# Patient Record
Sex: MALE | Race: BLACK OR AFRICAN AMERICAN | NOT HISPANIC OR LATINO | Employment: PART TIME | URBAN - METROPOLITAN AREA
[De-identification: names, ages, dates, MRNs, and addresses within clinical notes are randomized per-mention and may not be internally consistent; named-entity substitution may affect disease eponyms.]

---

## 2017-03-01 RX ORDER — CARVEDILOL 6.25 MG/1
TABLET ORAL
Qty: 180 TABLET | Refills: 0 | Status: SHIPPED | OUTPATIENT
Start: 2017-03-01 | End: 2017-05-28 | Stop reason: SDUPTHER

## 2017-04-05 RX ORDER — AMOXICILLIN 500 MG/1
CAPSULE ORAL
Qty: 4 CAPSULE | Refills: 0 | Status: SHIPPED | OUTPATIENT
Start: 2017-04-05 | End: 2017-08-01 | Stop reason: SDUPTHER

## 2017-04-10 RX ORDER — ATORVASTATIN CALCIUM 40 MG/1
TABLET, FILM COATED ORAL
Qty: 90 TABLET | Refills: 0 | Status: SHIPPED | OUTPATIENT
Start: 2017-04-10 | End: 2017-07-08 | Stop reason: SDUPTHER

## 2017-05-30 RX ORDER — CARVEDILOL 6.25 MG/1
TABLET ORAL
Qty: 180 TABLET | Refills: 0 | Status: SHIPPED | OUTPATIENT
Start: 2017-05-30 | End: 2017-09-01 | Stop reason: SDUPTHER

## 2017-07-10 RX ORDER — ATORVASTATIN CALCIUM 40 MG/1
TABLET, FILM COATED ORAL
Qty: 90 TABLET | Refills: 0 | Status: SHIPPED | OUTPATIENT
Start: 2017-07-10 | End: 2017-10-17 | Stop reason: SDUPTHER

## 2017-07-13 ENCOUNTER — OFFICE VISIT (OUTPATIENT)
Dept: CARDIOLOGY | Facility: CLINIC | Age: 77
End: 2017-07-13

## 2017-07-13 VITALS
SYSTOLIC BLOOD PRESSURE: 148 MMHG | BODY MASS INDEX: 27.09 KG/M2 | HEART RATE: 77 BPM | HEIGHT: 72 IN | WEIGHT: 200 LBS | DIASTOLIC BLOOD PRESSURE: 90 MMHG

## 2017-07-13 DIAGNOSIS — I25.10 CORONARY ARTERY DISEASE INVOLVING NATIVE CORONARY ARTERY OF NATIVE HEART WITHOUT ANGINA PECTORIS: ICD-10-CM

## 2017-07-13 DIAGNOSIS — Z98.890 S/P MVR (MITRAL VALVE REPAIR): ICD-10-CM

## 2017-07-13 DIAGNOSIS — Z95.2 S/P AVR (AORTIC VALVE REPLACEMENT): Primary | ICD-10-CM

## 2017-07-13 PROCEDURE — 93000 ELECTROCARDIOGRAM COMPLETE: CPT | Performed by: INTERNAL MEDICINE

## 2017-07-13 PROCEDURE — 99214 OFFICE O/P EST MOD 30 MIN: CPT | Performed by: INTERNAL MEDICINE

## 2017-07-13 RX ORDER — FEBUXOSTAT 40 MG/1
1 TABLET ORAL DAILY
Refills: 3 | COMMUNITY
Start: 2017-06-29

## 2017-07-13 NOTE — PROGRESS NOTES
Vj Bright  1940  Date of Office Visit: 7/13/17  Encounter Provider: Liborio Varghese MD  Place of Service: [facility] CARDIOLOGY    1. Followup aortic valve replacement with 25 mm bovine pericardial valve.   2. Mitral valve repair with flexible band.  3. Resection of subaortic stenosis.   4. STEMI.   5. Coronary artery disease.       History of Present Illness  Dr. Real,  I had the pleasure of seeing your patient back in follow up today. He is a pleasant 75-year-old gentleman who presented initially with a ST-elevation MI in and had a stent placed to his left circumflex coronary artery in 3/2015. He was found to have severe aortic stenosis on that hospitalization. He also had paroxysmal atrial fibrillation and associated with acute on chronic diastolic heart failure.    He was referred to Dr. Kim secondary to a mean gradient across his aortic valve of 58 mmHg and a valve area of 0.4 cm2.  He was admitted and underwent aortic valve replacement with 23 mm Milagros Graf bovine pericardial valve, mitral valve repair with 25 mm Medtronic flexible band and resection of subaortic stenosis. After CABG, he had paroxysmal atrial fibrillation and required IV amiodarone at increasing doses with return to sinus rhythm. He had some rebleeding and was taken back for evacuation of clot and  addressing of a smaller arterial bleeder. He did have some very mild expressive aphasia around the time of the operation, but this has resolved. He has had no recurrence of atrial fibrillation.  His amiodarone and Coumadin therapy have previously been stopped.      Since our last visit he has been doing very well.  He is working without any symptoms.  Denies any chest pain dyspnea on exertion or palpitations.  No sustained tachycardia.  He states that he feels well.  No orthopnea, PND, or lower extremity edema.      Review of Systems   Constitution: Negative for fever, weakness and  malaise/fatigue.   HENT: Negative for nosebleeds and sore throat.    Eyes: Negative for blurred vision and double vision.   Cardiovascular: Negative for chest pain, claudication, palpitations and syncope.   Respiratory: Negative for cough, shortness of breath and snoring.    Endocrine: Negative for cold intolerance, heat intolerance and polydipsia.   Skin: Negative for itching, poor wound healing and rash.   Musculoskeletal: Negative for joint pain, joint swelling, muscle weakness and myalgias.   Gastrointestinal: Negative for abdominal pain, melena, nausea and vomiting.   Neurological: Negative for light-headedness, loss of balance, seizures and vertigo.   Psychiatric/Behavioral: Negative for altered mental status and depression.       Past Medical History:   Diagnosis Date   • Acute respiratory failure    • Aortic stenosis    • BPH (benign prostatic hyperplasia)    • CAD (coronary artery disease)    • Chest pain    • CHF (congestive heart failure)    • Diabetes mellitus    • Gout    • Hyperlipidemia    • Hypertension    • PAF (paroxysmal atrial fibrillation)    • Pulmonary hypertension    • STEMI (ST elevation myocardial infarction)        The following portions of the patient's history were reviewed and updated as appropriate: Social history , Family history and Surgical history     Current Outpatient Prescriptions on File Prior to Visit   Medication Sig Dispense Refill   • ACCU-CHEK BRANDON PLUS test strip TEST DAILY AS DIRECTED  1   • ACCU-CHEK SOFTCLIX LANCETS lancets TEST DAILY AS DIRECTED  1   • amoxicillin (AMOXIL) 500 MG capsule TAKE 4 CAPSULES BY MOUTH ONE HOUR PRIOR TO DENTAL APPOINTMENT 4 capsule 0   • atorvastatin (LIPITOR) 40 MG tablet TAKE 1 TABLET BY MOUTH EVERY NIGHT AT BEDTIME 90 tablet 0   • carvedilol (COREG) 6.25 MG tablet TAKE 1 TABLET BY MOUTH TWICE DAILY WITH MEALS 180 tablet 0   • Cholecalciferol (VITAMIN D-3) 1000 UNITS capsule Take 1 capsule by mouth daily.     • furosemide (LASIX) 40 MG  "tablet TAKE 1 TABLET BY MOUTH EVERY DAY 90 tablet 3   • losartan (COZAAR) 25 MG tablet TAKE 1 TABLET BY MOUTH DAILY (Patient taking differently: TAKE 1 TABLET BY MOUTH TWICE DAILY) 30 tablet 5   • Omega-3 Fatty Acids (FISH OIL) 1000 MG capsule capsule Take 1 capsule by mouth daily.     • SitaGLIPtin-MetFORMIN HCl -1000 MG tablet sustained-release 24 hour Take 1 tablet by mouth daily.     • tamsulosin (FLOMAX) 0.4 MG capsule 24 hr capsule Take 2 capsules by mouth daily.     • [DISCONTINUED] allopurinol (ZYLOPRIM) 100 MG tablet Take 2 tablets by mouth daily.     • [DISCONTINUED] atorvastatin (LIPITOR) 40 MG tablet TAKE 1 TABLET BY MOUTH EVERY NIGHT AT BEDTIME 30 tablet 0   • [DISCONTINUED] carvedilol (COREG) 6.25 MG tablet TAKE 1 TABLET BY MOUTH TWICE DAILY WITH MEALS 60 tablet 5   • [DISCONTINUED] HYDROcodone-acetaminophen (NORCO) 5-325 MG per tablet   0   • [DISCONTINUED] losartan (COZAAR) 25 MG tablet TAKE 1 TABLET BY MOUTH DAILY 90 tablet 1   • [DISCONTINUED] pantoprazole (PROTONIX) 40 MG EC tablet Take 1 tablet by mouth daily.     • [DISCONTINUED] potassium chloride (KLOR-CON) 20 MEQ packet   1     No current facility-administered medications on file prior to visit.        No Known Allergies    Vitals:    07/13/17 1449   BP: 148/90   Pulse: 77   Weight: 200 lb (90.7 kg)   Height: 72\" (182.9 cm)     Physical Exam   Constitutional: He is oriented to person, place, and time. He appears well-developed and well-nourished.   HENT:   Head: Normocephalic and atraumatic.   Eyes: Conjunctivae and EOM are normal. No scleral icterus.   Neck: Normal range of motion. Neck supple. Normal carotid pulses, no hepatojugular reflux and no JVD present. Carotid bruit is not present. No tracheal deviation present. No thyromegaly present.   Cardiovascular: Normal rate and regular rhythm.  Exam reveals no gallop and no friction rub.    No murmur heard.  Pulses:       Carotid pulses are 2+ on the right side, and 2+ on the left " side.       Radial pulses are 2+ on the right side, and 2+ on the left side.        Femoral pulses are 2+ on the right side, and 2+ on the left side.       Dorsalis pedis pulses are 2+ on the right side, and 2+ on the left side.        Posterior tibial pulses are 2+ on the right side, and 2+ on the left side.   Pulmonary/Chest: Breath sounds normal. No respiratory distress. He has no decreased breath sounds. He has no wheezes. He has no rhonchi. He has no rales. He exhibits no tenderness.   Abdominal: Soft. Bowel sounds are normal. He exhibits no distension. There is no tenderness. There is no rebound.   Musculoskeletal: He exhibits no edema or deformity.   Neurological: He is alert and oriented to person, place, and time. He has normal strength. No sensory deficit.   Skin: No rash noted. No erythema.   Sternotomy     Psychiatric: He has a normal mood and affect. His behavior is normal.           No components found for: CBC  No results found for: CMP  No components found for: LIPID  No results found for: BMP      ECG 12 Lead  Date/Time: 7/13/2017 3:34 PM  Performed by: SARA BELTRE  Authorized by: SARA BELTRE   Comparison: compared with previous ECG from 7/8/2016  Similar to previous ECG  Rhythm: sinus rhythm  Rate: normal  ST Segments: ST segments normal  T Waves: T waves normal  QRS axis: normal  Other findings: LAE  Clinical impression: non-specific ECG            DISCUSSION/SUMMARY  76-year-old gentleman with a medical history as documented above including a presentation in 2015 with an inferior lateral STEMI and drug-eluting stent to the left circumflex coronary artery, severe aortic valve stenosis with a bioprosthetic aortic valve replacement as documented above, mitral valve repair, resection of subaortic stenosis, and paroxysmal atrial fibrillation perioperatively who presents back to me for follow-up.  He is doing very well and is asymptomatic.  His blood pressure is mildly elevated  today however he was a little frustrating dealing with some insurance issue.  His repeat was mildly improved.  Diastolic was still slightly elevated.  Otherwise no significant issues at this time.    1. Coronary disease, previous inferior lateral STEMI with percutaneous intervention to the left circumflex coronary artery disease: No angina.  Patient is doing very well.      -Continue his current regimen, which includes aspirin, carvedilol, losartan, and atorvastatin.   2. Aortic valve replacement, bioprosthetic.      -Continue aspirin.  Follow up echocardiogram after operation has been performed showing normal prosthetic valve functioning.    3.  Essential hypertension: Blood pressure is mildly elevated.  He is to monitor his blood pressure over the next week and if his numbers are above goal we'll plan on increasing his losartan to 75 mg ami.y    I will plan on having him see me back in clinic in one year.         Coronary Artery Disease  Assessment  • The patient has no angina    Plan  • Lifestyle modifications discussed include adhering to a heart healthy diet, avoidance of tobacco products, maintenance of a healthy weight, medication compliance and regular monitoring of cholesterol and blood pressure    Subjective - Objective  • There has been a previous stent procedure using MARIE  • Current antiplatelet therapy includes aspirin 81 mg

## 2017-07-20 RX ORDER — FUROSEMIDE 40 MG/1
TABLET ORAL
Qty: 90 TABLET | Refills: 2 | Status: SHIPPED | OUTPATIENT
Start: 2017-07-20 | End: 2018-04-25 | Stop reason: SDUPTHER

## 2017-07-31 ENCOUNTER — TELEPHONE (OUTPATIENT)
Dept: CARDIOLOGY | Facility: CLINIC | Age: 77
End: 2017-07-31

## 2017-08-01 RX ORDER — AMOXICILLIN 500 MG/1
2000 CAPSULE ORAL ONCE
Qty: 4 CAPSULE | Refills: 0 | Status: SHIPPED | OUTPATIENT
Start: 2017-08-01 | End: 2017-08-01

## 2017-08-10 ENCOUNTER — TELEPHONE (OUTPATIENT)
Dept: CARDIOLOGY | Facility: CLINIC | Age: 77
End: 2017-08-10

## 2017-08-10 NOTE — TELEPHONE ENCOUNTER
----- Message from Elsa Arias MA sent at 7/31/2017  4:35 PM EDT -----  Is this message intended for you? I don't mind at all to call the pt, but I didn't receive the initial call. It looks like the pt did call today, but not regarding bp.     ----- Message -----     From: Liborio Varghese MD     Sent: 7/31/2017   2:25 PM       To: Elsa Arias MA    Please tell this patient that his blood pressures have been reviewed and are acceptable.  I would not recommend any changes at this time

## 2017-09-02 RX ORDER — CARVEDILOL 6.25 MG/1
TABLET ORAL
Qty: 180 TABLET | Refills: 1 | Status: SHIPPED | OUTPATIENT
Start: 2017-09-02 | End: 2018-03-01 | Stop reason: SDUPTHER

## 2017-10-18 RX ORDER — ATORVASTATIN CALCIUM 40 MG/1
TABLET, FILM COATED ORAL
Qty: 90 TABLET | Refills: 1 | Status: SHIPPED | OUTPATIENT
Start: 2017-10-18 | End: 2018-04-16 | Stop reason: SDUPTHER

## 2017-11-27 ENCOUNTER — TELEPHONE (OUTPATIENT)
Dept: CARDIOLOGY | Facility: CLINIC | Age: 77
End: 2017-11-27

## 2017-11-27 NOTE — TELEPHONE ENCOUNTER
Pt called and is getting dental work done. You gave him amoxicillin 2,000mg in the past. Is this okay to send in again?    Lyric

## 2017-11-28 RX ORDER — AMOXICILLIN 500 MG/1
2000 CAPSULE ORAL SEE ADMIN INSTRUCTIONS
Qty: 12 CAPSULE | Refills: 0 | Status: SHIPPED | OUTPATIENT
Start: 2017-11-28 | End: 2018-01-18

## 2018-01-18 ENCOUNTER — OFFICE VISIT (OUTPATIENT)
Dept: CARDIOLOGY | Facility: CLINIC | Age: 78
End: 2018-01-18

## 2018-01-18 VITALS
SYSTOLIC BLOOD PRESSURE: 150 MMHG | WEIGHT: 200 LBS | BODY MASS INDEX: 27.09 KG/M2 | DIASTOLIC BLOOD PRESSURE: 86 MMHG | HEART RATE: 71 BPM | HEIGHT: 72 IN

## 2018-01-18 DIAGNOSIS — Z95.2 S/P AVR (AORTIC VALVE REPLACEMENT): ICD-10-CM

## 2018-01-18 DIAGNOSIS — Z98.890 S/P MVR (MITRAL VALVE REPAIR): ICD-10-CM

## 2018-01-18 DIAGNOSIS — I10 ESSENTIAL HYPERTENSION: ICD-10-CM

## 2018-01-18 DIAGNOSIS — I25.10 CORONARY ARTERY DISEASE INVOLVING NATIVE CORONARY ARTERY OF NATIVE HEART WITHOUT ANGINA PECTORIS: Primary | ICD-10-CM

## 2018-01-18 PROCEDURE — 93000 ELECTROCARDIOGRAM COMPLETE: CPT | Performed by: INTERNAL MEDICINE

## 2018-01-18 PROCEDURE — 99214 OFFICE O/P EST MOD 30 MIN: CPT | Performed by: INTERNAL MEDICINE

## 2018-01-18 RX ORDER — LOSARTAN POTASSIUM 50 MG/1
50 TABLET ORAL DAILY
Qty: 30 TABLET | Refills: 6 | Status: SHIPPED | OUTPATIENT
Start: 2018-01-18 | End: 2018-01-19 | Stop reason: SDUPTHER

## 2018-01-18 RX ORDER — ASPIRIN 325 MG
325 TABLET, DELAYED RELEASE (ENTERIC COATED) ORAL DAILY
COMMUNITY
End: 2019-08-14

## 2018-01-18 NOTE — PROGRESS NOTES
Vj Bright  1940  Date of Office Visit: 1/18/18  Encounter Provider: Liborio Varghese MD  Place of Service: [facility] CARDIOLOGY    Chief complaint  1. Followup aortic valve replacement with 25 mm bovine pericardial valve.   2. Mitral valve repair with flexible band.  3. Resection of subaortic stenosis.   4. STEMI.   5. Coronary artery disease.     History of Present Illness  Dr. Real,  I had the pleasure of seeing your patient back in follow up today. He is a pleasant 77-year-old gentleman who presented initially with a ST-elevation MI in and had a stent placed to his left circumflex coronary artery in 3/2015. He was found to have severe aortic stenosis on that hospitalization. He also had paroxysmal atrial fibrillation and associated with acute on chronic diastolic heart failure.    He was referred to Dr. Kim secondary to a mean gradient across his aortic valve of 58 mmHg and a valve area of 0.4 cm2.  He was admitted and underwent aortic valve replacement with 23 mm Milagros Graf bovine pericardial valve, mitral valve repair with 25 mm Medtronic flexible band and resection of subaortic stenosis. After CABG, he had paroxysmal atrial fibrillation and required IV amiodarone at increasing doses with return to sinus rhythm. He had some rebleeding and was taken back for evacuation of clot and  addressing of a smaller arterial bleeder. He did have some very mild expressive aphasia around the time of the operation, but this has resolved. He has had no recurrence of atrial fibrillation.  His amiodarone and Coumadin therapy have previously been stopped.       Since our last visit, he states he has been doing very well.  He denies any chest pain or dyspnea.  No orthopnea or PND.  He is working without limitations.            Review of Systems   Constitution: Negative for fever, weakness and malaise/fatigue.   HENT: Negative for nosebleeds and sore throat.    Eyes:  Negative for blurred vision and double vision.   Cardiovascular: Negative for chest pain, claudication, palpitations and syncope.   Respiratory: Negative for cough, shortness of breath and snoring.    Endocrine: Negative for cold intolerance, heat intolerance and polydipsia.   Skin: Negative for itching, poor wound healing and rash.   Musculoskeletal: Negative for joint pain, joint swelling, muscle weakness and myalgias.   Gastrointestinal: Negative for abdominal pain, melena, nausea and vomiting.   Neurological: Negative for light-headedness, loss of balance, seizures and vertigo.   Psychiatric/Behavioral: Negative for altered mental status and depression.       Past Medical History:   Diagnosis Date   • Acute respiratory failure    • Aortic stenosis    • BPH (benign prostatic hyperplasia)    • CAD (coronary artery disease)    • Chest pain    • CHF (congestive heart failure)    • Diabetes mellitus    • Gout    • Hyperlipidemia    • Hypertension    • PAF (paroxysmal atrial fibrillation)    • Pulmonary hypertension    • STEMI (ST elevation myocardial infarction)        The following portions of the patient's history were reviewed and updated as appropriate: Social history , Family history and Surgical history     Current Outpatient Prescriptions on File Prior to Visit   Medication Sig Dispense Refill   • ACCU-CHEK BRANDON PLUS test strip TEST DAILY AS DIRECTED  1   • ACCU-CHEK SOFTCLIX LANCETS lancets TEST DAILY AS DIRECTED  1   • atorvastatin (LIPITOR) 40 MG tablet TAKE 1 TABLET BY MOUTH EVERY NIGHT AT BEDTIME 90 tablet 1   • carvedilol (COREG) 6.25 MG tablet TAKE 1 TABLET BY MOUTH TWICE DAILY WITH MEALS 180 tablet 1   • Cholecalciferol (VITAMIN D-3) 1000 UNITS capsule Take 1 capsule by mouth daily.     • furosemide (LASIX) 40 MG tablet TAKE 1 TABLET BY MOUTH EVERY DAY 90 tablet 2   • losartan (COZAAR) 25 MG tablet TAKE 1 TABLET BY MOUTH DAILY (Patient taking differently: TAKE 1 TABLET BY MOUTH TWICE DAILY) 30 tablet  "5   • Omega-3 Fatty Acids (FISH OIL) 1000 MG capsule capsule Take 1 capsule by mouth daily.     • SitaGLIPtin-MetFORMIN HCl -1000 MG tablet sustained-release 24 hour Take 1 tablet by mouth daily.     • tamsulosin (FLOMAX) 0.4 MG capsule 24 hr capsule Take 2 capsules by mouth daily.     • ULORIC 40 MG tablet Take 1 tablet by mouth Daily.  3   • [DISCONTINUED] amoxicillin (AMOXIL) 500 MG capsule Take 4 capsules by mouth See Admin Instructions. 4 capsules 1 hour prior to procedure 12 capsule 0     No current facility-administered medications on file prior to visit.        No Known Allergies    Vitals:    01/18/18 0953   BP: 150/86   Pulse: 71   Weight: 90.7 kg (200 lb)   Height: 182.9 cm (72\")     Physical Exam   Constitutional: He is oriented to person, place, and time. He appears well-developed and well-nourished.   HENT:   Head: Normocephalic and atraumatic.   Eyes: Conjunctivae and EOM are normal. No scleral icterus.   Neck: Normal range of motion. Neck supple. Normal carotid pulses, no hepatojugular reflux and no JVD present. Carotid bruit is not present. No tracheal deviation present. No thyromegaly present.   Cardiovascular: Normal rate and regular rhythm.  Exam reveals no gallop and no friction rub.    No murmur heard.  Pulses:       Carotid pulses are 2+ on the right side, and 2+ on the left side.       Radial pulses are 2+ on the right side, and 2+ on the left side.        Femoral pulses are 2+ on the right side, and 2+ on the left side.       Dorsalis pedis pulses are 2+ on the right side, and 2+ on the left side.        Posterior tibial pulses are 2+ on the right side, and 2+ on the left side.   Pulmonary/Chest: Breath sounds normal. No respiratory distress. He has no decreased breath sounds. He has no wheezes. He has no rhonchi. He has no rales. He exhibits no tenderness.   Abdominal: Soft. Bowel sounds are normal. He exhibits no distension. There is no tenderness. There is no rebound. "   Musculoskeletal: He exhibits no edema or deformity.   Neurological: He is alert and oriented to person, place, and time. He has normal strength. No sensory deficit.   Skin: No rash noted. No erythema.   Sternotomy     Psychiatric: He has a normal mood and affect. His behavior is normal.       No components found for: CBC  No results found for: CMP  No components found for: LIPID  No results found for: BMP      ECG 12 Lead  Date/Time: 1/18/2018 10:06 AM  Performed by: SARA BELTRE  Authorized by: SARA BELTRE   Comparison: compared with previous ECG from 7/13/2017  Rhythm: sinus rhythm  Rate: normal  QRS axis: normal  Other findings: LAE  Clinical impression: non-specific ECG            DISCUSSION/SUMMARY  77-year-old gentleman with a medical history as documented above including a presentation in 2015 with an inferior lateral STEMI and drug-eluting stent to the left circumflex coronary artery, severe aortic valve stenosis with a bioprosthetic aortic valve replacement as documented above, mitral valve repair, resection of subaortic stenosis, and paroxysmal atrial fibrillation perioperatively who presents back to me for follow-up.  He is doing very well and is asymptomatic.  Blood pressure is mildly elevated again today.    1. Coronary disease, previous inferior lateral STEMI with percutaneous intervention to the left circumflex coronary artery disease: No angina.  Patient is doing very well.      -Continue his current regimen, which includes aspirin, carvedilol, losartan, and atorvastatin.    2. Aortic valve replacement, bioprosthetic.      -Continue aspirin.  Follow up echocardiogram after operation has been performed showing normal prosthetic valve functioning.    3.  Essential hypertension: Blood pressure is mildly elevated.  Increase losartan to 50 mg daily secondary to the elevation.    I will plan on having him see me back in clinic in one year.         Coronary Artery Disease  Assessment  •  The patient has no angina    Plan  • Lifestyle modifications discussed include adhering to a heart healthy diet, avoidance of tobacco products, maintenance of a healthy weight, medication compliance and regular monitoring of cholesterol and blood pressure    Subjective - Objective  • There has been a previous stent procedure using MARIE  • Current antiplatelet therapy includes aspirin 81 mg

## 2018-01-19 RX ORDER — LOSARTAN POTASSIUM 50 MG/1
50 TABLET ORAL 2 TIMES DAILY
Qty: 60 TABLET | Refills: 5 | Status: SHIPPED | OUTPATIENT
Start: 2018-01-19 | End: 2018-08-11 | Stop reason: SDUPTHER

## 2018-03-01 RX ORDER — PANTOPRAZOLE SODIUM 40 MG/1
TABLET, DELAYED RELEASE ORAL
Qty: 30 TABLET | Refills: 3 | OUTPATIENT
Start: 2018-03-01

## 2018-03-01 RX ORDER — CARVEDILOL 6.25 MG/1
6.25 TABLET ORAL 2 TIMES DAILY WITH MEALS
Qty: 180 TABLET | Refills: 1 | Status: SHIPPED | OUTPATIENT
Start: 2018-03-01 | End: 2018-08-30 | Stop reason: SDUPTHER

## 2018-04-16 RX ORDER — ATORVASTATIN CALCIUM 40 MG/1
TABLET, FILM COATED ORAL
Qty: 90 TABLET | Refills: 3 | Status: SHIPPED | OUTPATIENT
Start: 2018-04-16 | End: 2019-04-10 | Stop reason: SDUPTHER

## 2018-04-20 ENCOUNTER — TELEPHONE (OUTPATIENT)
Dept: CARDIOLOGY | Facility: CLINIC | Age: 78
End: 2018-04-20

## 2018-04-20 RX ORDER — AMOXICILLIN 500 MG/1
2000 CAPSULE ORAL SEE ADMIN INSTRUCTIONS
Qty: 4 CAPSULE | Refills: 0 | Status: SHIPPED | OUTPATIENT
Start: 2018-04-20 | End: 2018-10-12 | Stop reason: SDUPTHER

## 2018-04-20 NOTE — TELEPHONE ENCOUNTER
Pt called stating that he is due to have a dental in May and is needing as refill on his Amoxicillin     Is this ok to refill?

## 2018-04-25 RX ORDER — FUROSEMIDE 40 MG/1
TABLET ORAL
Qty: 90 TABLET | Refills: 0 | Status: SHIPPED | OUTPATIENT
Start: 2018-04-25 | End: 2018-07-25 | Stop reason: SDUPTHER

## 2018-05-01 ENCOUNTER — TELEPHONE (OUTPATIENT)
Dept: CARDIOLOGY | Facility: CLINIC | Age: 78
End: 2018-05-01

## 2018-05-01 RX ORDER — AMLODIPINE BESYLATE 5 MG/1
5 TABLET ORAL DAILY
Qty: 30 TABLET | Refills: 11 | COMMUNITY
Start: 2018-05-01 | End: 2018-09-17

## 2018-05-01 NOTE — TELEPHONE ENCOUNTER
I called him because he sent a letter. His HR has been running in the 70's. Also in the letter he let us know Dr. Real hadn't gotten our last couple of letters and wasn't aware that we added Losartan in 1/2018. He also said he saw Dr. Real on 4/24/18 and he added amlodipine 5mg qd.  I will get the note, it also seems we had the wrong Dr. Real as his PCP, when he registered they put his wife instead. So I will fax prior notes to him and I added the amlodipine to his list.    Lyric

## 2018-05-01 NOTE — TELEPHONE ENCOUNTER
----- Message from Liborio Varghese MD sent at 4/29/2018  4:39 PM EDT -----  I have a bunch of bp measurements on him, but I need HR as well.

## 2018-05-15 NOTE — TELEPHONE ENCOUNTER
I was finally able to get a hold of him. He said Dr. Real has changed a medication so he was going to drop by a list of what he is taking and a list of BPs. He also had a question about his Uloric and heart effects.  I will keep an eye out on this.    Lyric

## 2018-05-31 NOTE — TELEPHONE ENCOUNTER
The message for him.  I prefer to have him off of Uloric if possible, however if he has not done well on allopurinol, then that may be the only option

## 2018-07-25 RX ORDER — FUROSEMIDE 40 MG/1
TABLET ORAL
Qty: 90 TABLET | Refills: 0 | Status: SHIPPED | OUTPATIENT
Start: 2018-07-25 | End: 2018-09-17 | Stop reason: ALTCHOICE

## 2018-08-13 RX ORDER — LOSARTAN POTASSIUM 50 MG/1
TABLET ORAL
Qty: 180 TABLET | Refills: 1 | Status: SHIPPED | OUTPATIENT
Start: 2018-08-13 | End: 2019-02-09 | Stop reason: SDUPTHER

## 2018-08-30 RX ORDER — CARVEDILOL 6.25 MG/1
TABLET ORAL
Qty: 180 TABLET | Refills: 0 | Status: SHIPPED | OUTPATIENT
Start: 2018-08-30 | End: 2018-11-26 | Stop reason: SDUPTHER

## 2018-09-17 ENCOUNTER — APPOINTMENT (OUTPATIENT)
Dept: LAB | Facility: HOSPITAL | Age: 78
End: 2018-09-17

## 2018-09-17 ENCOUNTER — OFFICE VISIT (OUTPATIENT)
Dept: CARDIOLOGY | Facility: CLINIC | Age: 78
End: 2018-09-17

## 2018-09-17 VITALS
BODY MASS INDEX: 26.95 KG/M2 | DIASTOLIC BLOOD PRESSURE: 92 MMHG | HEART RATE: 65 BPM | SYSTOLIC BLOOD PRESSURE: 138 MMHG | WEIGHT: 199 LBS | HEIGHT: 72 IN

## 2018-09-17 DIAGNOSIS — Z98.890 S/P MVR (MITRAL VALVE REPAIR): ICD-10-CM

## 2018-09-17 DIAGNOSIS — Z95.2 S/P AVR (AORTIC VALVE REPLACEMENT): ICD-10-CM

## 2018-09-17 DIAGNOSIS — I25.10 CORONARY ARTERY DISEASE INVOLVING NATIVE CORONARY ARTERY OF NATIVE HEART WITHOUT ANGINA PECTORIS: ICD-10-CM

## 2018-09-17 DIAGNOSIS — I10 ESSENTIAL HYPERTENSION: Primary | ICD-10-CM

## 2018-09-17 LAB — NT-PROBNP SERPL-MCNC: 353.8 PG/ML (ref 0–1800)

## 2018-09-17 PROCEDURE — 83880 ASSAY OF NATRIURETIC PEPTIDE: CPT | Performed by: INTERNAL MEDICINE

## 2018-09-17 PROCEDURE — 36415 COLL VENOUS BLD VENIPUNCTURE: CPT | Performed by: INTERNAL MEDICINE

## 2018-09-17 PROCEDURE — 93000 ELECTROCARDIOGRAM COMPLETE: CPT | Performed by: INTERNAL MEDICINE

## 2018-09-17 PROCEDURE — 99214 OFFICE O/P EST MOD 30 MIN: CPT | Performed by: INTERNAL MEDICINE

## 2018-09-17 RX ORDER — CLONIDINE HYDROCHLORIDE 0.2 MG/1
TABLET ORAL DAILY
COMMUNITY
Start: 2018-09-04 | End: 2018-09-17

## 2018-09-17 RX ORDER — TORSEMIDE 10 MG/1
20 TABLET ORAL EVERY MORNING
COMMUNITY
Start: 2018-09-04 | End: 2020-04-07 | Stop reason: ALTCHOICE

## 2018-09-17 RX ORDER — CLONIDINE HYDROCHLORIDE 0.1 MG/1
0.3 TABLET ORAL NIGHTLY
COMMUNITY
Start: 2018-09-17 | End: 2020-04-07 | Stop reason: ALTCHOICE

## 2018-09-17 NOTE — PROGRESS NOTES
Vj Bright  1940  Date of Office Visit: 9/17/18  Encounter Provider: Liborio Varghese MD  Place of Service: [facility] CARDIOLOGY    Chief complaint  1. Followup aortic valve replacement with 25 mm bovine pericardial valve.   2. Mitral valve repair with flexible band.  3. Resection of subaortic stenosis.   4. STEMI.   5. Coronary artery disease.     History of Present Illness  Dr. Real,  I had the pleasure of seeing your patient back in follow up today. He is a pleasant 78-year-old gentleman who presented initially with a ST-elevation MI in and had a stent placed to his left circumflex coronary artery in 3/2015. He was found to have severe aortic stenosis on that hospitalization. He also had paroxysmal atrial fibrillation and associated with acute on chronic diastolic heart failure.    He was referred to Dr. Kim secondary to a mean gradient across his aortic valve of 58 mmHg and a valve area of 0.4 cm2.  He was admitted and underwent aortic valve replacement with 23 mm Milagros Graf bovine pericardial valve, mitral valve repair with 25 mm Medtronic flexible band and resection of subaortic stenosis. After CABG, he had paroxysmal atrial fibrillation and required IV amiodarone at increasing doses with return to sinus rhythm. He had some rebleeding and was taken back for evacuation of clot and addressing of a smaller arterial bleeder. He did have some very mild expressive aphasia around the time of the operation, but this has resolved. He has had no recurrence of atrial fibrillation.  His amiodarone and Coumadin therapy have previously been stopped.       Since our last visit, he has had some issues with his blood pressure.  He has been followed closely by your office and has been taken off amlodipine therapy.  He was started on clonidine; however, this is every day.  He has also continued on losartan therapy.  His blood pressures have looked better.  Today, he  is 138/92 mmHg, which is a little bit better than he has been in the past.  He does complain of bilateral lower extremity edema that is mild.  It tends to be positional in that, when he elevates his feet and his sleeping, it tends to improve.  By the morning, his legs look fine and then throughout the day he has at least mild bilateral lower extremity edema.  He denies any pain or asymmetry.  He has no dyspnea, orthopnea, or paroxysmal nocturnal dyspnea.        Review of Systems   Constitution: Negative for fever, weakness and malaise/fatigue.   HENT: Negative for nosebleeds and sore throat.    Eyes: Negative for blurred vision and double vision.   Cardiovascular: Negative for chest pain, claudication, palpitations and syncope.   Respiratory: Negative for cough, shortness of breath and snoring.    Endocrine: Negative for cold intolerance, heat intolerance and polydipsia.   Skin: Negative for itching, poor wound healing and rash.   Musculoskeletal: Negative for joint pain, joint swelling, muscle weakness and myalgias.   Gastrointestinal: Negative for abdominal pain, melena, nausea and vomiting.   Neurological: Negative for light-headedness, loss of balance, seizures and vertigo.   Psychiatric/Behavioral: Negative for altered mental status and depression.       Past Medical History:   Diagnosis Date   • Acute respiratory failure (CMS/[facility])    • Aortic stenosis    • BPH (benign prostatic hyperplasia)    • CAD (coronary artery disease)    • Chest pain    • CHF (congestive heart failure) (CMS/[facility])    • Diabetes mellitus (CMS/[facility])    • Gout    • Hyperlipidemia    • Hypertension    • PAF (paroxysmal atrial fibrillation) (CMS/[facility])    • Pulmonary hypertension    • STEMI (ST elevation myocardial infarction) (CMS/[facility])        The following portions of the patient's history were reviewed and updated as appropriate: Social history , Family history and Surgical history     Current Outpatient Prescriptions on File Prior to Visit  "  Medication Sig Dispense Refill   • ACCU-CHEK BRANDON PLUS test strip TEST DAILY AS DIRECTED  1   • ACCU-CHEK SOFTCLIX LANCETS lancets TEST DAILY AS DIRECTED  1   • amLODIPine (NORVASC) 5 MG tablet Take 1 tablet by mouth Daily. 30 tablet 11   • aspirin 81 MG EC tablet Take 81 mg by mouth Daily.     • atorvastatin (LIPITOR) 40 MG tablet TAKE ONE TABLET BY MOUTH EVERY NIGHT AT BEDTIME 90 tablet 3   • carvedilol (COREG) 6.25 MG tablet TAKE ONE TABLET BY MOUTH TWICE A DAY WITH MEALS 180 tablet 0   • Cholecalciferol (VITAMIN D-3) 1000 UNITS capsule Take 1 capsule by mouth daily.     • losartan (COZAAR) 50 MG tablet TAKE ONE TABLET BY MOUTH TWO TIMES A DAY (Patient taking differently: TAKES  TWO TABLETS ONCE DAILY) 180 tablet 1   • Omega-3 Fatty Acids (FISH OIL) 1000 MG capsule capsule Take 1 capsule by mouth daily.     • SitaGLIPtin-MetFORMIN HCl -1000 MG tablet sustained-release 24 hour Take 1 tablet by mouth daily.     • tamsulosin (FLOMAX) 0.4 MG capsule 24 hr capsule Take 2 capsules by mouth daily.     • ULORIC 40 MG tablet Take 1 tablet by mouth Daily.  3   • amoxicillin (AMOXIL) 500 MG capsule Take 4 capsules by mouth See Admin Instructions. 4 capsules 1 hour prior to procedure 4 capsule 0   • [DISCONTINUED] furosemide (LASIX) 40 MG tablet TAKE ONE TABLET BY MOUTH DAILY 90 tablet 0     No current facility-administered medications on file prior to visit.        No Known Allergies    Vitals:    09/17/18 0956   BP: 138/92   BP Location: Left arm   Patient Position: Sitting   Pulse: 65   Weight: 90.3 kg (199 lb)   Height: 182.9 cm (72\")     Physical Exam   Constitutional: He is oriented to person, place, and time. He appears well-developed and well-nourished.   HENT:   Head: Normocephalic and atraumatic.   Eyes: Conjunctivae and EOM are normal. No scleral icterus.   Neck: Normal range of motion. Neck supple. Normal carotid pulses, no hepatojugular reflux and no JVD present. Carotid bruit is not present. No " tracheal deviation present. No thyromegaly present.   Cardiovascular: Normal rate and regular rhythm.  Exam reveals no gallop and no friction rub.    No murmur heard.  Pulses:       Carotid pulses are 2+ on the right side, and 2+ on the left side.       Radial pulses are 2+ on the right side, and 2+ on the left side.        Femoral pulses are 2+ on the right side, and 2+ on the left side.       Dorsalis pedis pulses are 2+ on the right side, and 2+ on the left side.        Posterior tibial pulses are 2+ on the right side, and 2+ on the left side.   Pulmonary/Chest: Breath sounds normal. No respiratory distress. He has no decreased breath sounds. He has no wheezes. He has no rhonchi. He has no rales. He exhibits no tenderness.   Abdominal: Soft. Bowel sounds are normal. He exhibits no distension. There is no tenderness. There is no rebound.   Musculoskeletal: He exhibits edema (1+ bilateral LE). He exhibits no deformity.   Neurological: He is alert and oriented to person, place, and time. He has normal strength. No sensory deficit.   Skin: No rash noted. No erythema.   Sternotomy     Psychiatric: He has a normal mood and affect. His behavior is normal.       No components found for: CBC  No results found for: CMP  No components found for: LIPID  No results found for: BMP      ECG 12 Lead  Date/Time: 9/17/2018 10:13 AM  Performed by: SARA BELTRE  Authorized by: SARA BELTRE   Comparison: compared with previous ECG from 1/18/2018  Similar to previous ECG  Rhythm: sinus rhythm  Rate: normal  QRS axis: normal  Comments: Nonspecific T-wave abnormalities diffuse leads.            DISCUSSION/SUMMARY  78-year-old gentleman with a medical history as documented above including a presentation in 2015 with an inferior lateral STEMI and drug-eluting stent to the left circumflex coronary artery, severe aortic valve stenosis with a bioprosthetic aortic valve replacement as documented above, mitral valve repair,  resection of subaortic stenosis, and paroxysmal atrial fibrillation perioperatively who presents back to me for follow-up.  His blood pressure is better controlled than it has been, however is still not perfectly at goal.  He has mild bilateral lower extremity edema that to me seems to be more venous insufficiency than central vascular volume overload    1. Coronary disease, previous inferior lateral STEMI with percutaneous intervention to the left circumflex coronary artery disease: No angina.  Patient is doing very well.      -Continue his current regimen, which includes aspirin, carvedilol, losartan, and atorvastatin.    2. Aortic valve replacement, bioprosthetic.      -Continue aspirin.  Follow up echocardiogram after operation has been performed showing normal prosthetic valve functioning.    3.  Essential hypertension: Being closely managed by primary.  Reasonably controlled.  The half-life of clonidine would support at least twice a day dosing and I have recommended that he moved to twice a day 0.1 mg.  If necessary this can be increased to 0.2 mg, however I would not give him less frequently than twice a day.  4.  Bilateral lower extremity edema: Likely venous insufficiency.   -I'm going to repeat the transthoracic echocardiogram and order a proBNP to evaluate   -Recommend moderate pressure compression stockings.    I will plan on having him see me back in clinic in one year.         Coronary Artery Disease  Assessment  • The patient has no angina    Plan  • Lifestyle modifications discussed include adhering to a heart healthy diet, avoidance of tobacco products, maintenance of a healthy weight, medication compliance and regular monitoring of cholesterol and blood pressure    Subjective - Objective  • There has been a previous stent procedure using MARIE  • Current antiplatelet therapy includes aspirin 81 mg

## 2018-09-17 NOTE — PROGRESS NOTES
Please tell him that his lab looks good.  No evidence of volume overload.  Continue with compression stockings.  No change in his diuretic.

## 2018-09-21 ENCOUNTER — HOSPITAL ENCOUNTER (EMERGENCY)
Facility: HOSPITAL | Age: 78
Discharge: HOME | End: 2018-09-21
Attending: EMERGENCY MEDICINE | Admitting: EMERGENCY MEDICINE

## 2018-09-21 ENCOUNTER — APPOINTMENT (OUTPATIENT)
Dept: GENERAL RADIOLOGY | Facility: HOSPITAL | Age: 78
End: 2018-09-21

## 2018-09-21 VITALS
RESPIRATION RATE: 16 BRPM | DIASTOLIC BLOOD PRESSURE: 68 MMHG | TEMPERATURE: 97.5 F | SYSTOLIC BLOOD PRESSURE: 134 MMHG | HEIGHT: 72 IN | HEART RATE: 64 BPM | OXYGEN SATURATION: 96 %

## 2018-09-21 DIAGNOSIS — S92.912A UNSPECIFIED FRACTURE OF LEFT TOE(S), INITIAL ENCOUNTER FOR CLOSED FRACTURE: Primary | ICD-10-CM

## 2018-09-21 PROCEDURE — 99283 EMERGENCY DEPT VISIT LOW MDM: CPT

## 2018-09-21 PROCEDURE — 73630 X-RAY EXAM OF FOOT: CPT

## 2018-09-21 NOTE — ED PROVIDER NOTES
EMERGENCY DEPARTMENT ENCOUNTER    Room Number:  14/14  Date seen:  9/21/2018  Time seen: 6:28 AM  PCP: Nany Real MD  Historian: Patient      HPI:  Chief complaint: toe injury  Context: Vj Bright is a 78 y.o. male who presents to the ED c/o toe injury just prior to arrival.  Patient states that he kicked the post of his bed while walking.  He denies falling.  He denies any ankle or other extremity injury.  No previous foot injury.    MEDICAL RECORD REVIEW     No previous pertinent medical records.      ALLERGIES  Patient has no known allergies.    PAST MEDICAL HISTORY  Active Ambulatory Problems     Diagnosis Date Noted   • S/P AVR (aortic valve replacement) 01/21/2016   • S/P MVR (mitral valve repair) 01/21/2016   • Coronary artery disease involving native coronary artery of native heart without angina pectoris 07/13/2017   • Essential hypertension 01/18/2018     Resolved Ambulatory Problems     Diagnosis Date Noted   • No Resolved Ambulatory Problems     Past Medical History:   Diagnosis Date   • Acute respiratory failure (CMS/HCC)    • Aortic stenosis    • BPH (benign prostatic hyperplasia)    • CAD (coronary artery disease)    • Chest pain    • CHF (congestive heart failure) (CMS/HCC)    • Diabetes mellitus (CMS/HCC)    • Gout    • Hyperlipidemia    • Hypertension    • PAF (paroxysmal atrial fibrillation) (CMS/HCC)    • Pulmonary hypertension    • STEMI (ST elevation myocardial infarction) (CMS/[facility])        PAST SURGICAL HISTORY  Past Surgical History:   Procedure Laterality Date   • AORTIC VALVE REPAIR/REPLACEMENT     • CARDIAC SURGERY     • CARDIAC SURGERY     • CORONARY ANGIOPLASTY WITH STENT PLACEMENT     • MITRAL VALVE REPAIR/REPLACEMENT     • OTHER SURGICAL HISTORY      AORTIC VALVE REPAIR OF SUBVALVULAR AORTIC STENOSIS   • OTHER SURGICAL HISTORY      CARDIAC CATH PROCEDURE OUTCOME: SUCCESSFUL   • OTHER SURGICAL HISTORY      VENOUS THROMBECTOMY BY COMBINED LEG INCISION       FAMILY  HISTORY  Family History   Problem Relation Age of Onset   • Hypertension Mother    • Heart attack Father        SOCIAL HISTORY  Social History     Social History   • Marital status:      Spouse name: N/A   • Number of children: N/A   • Years of education: N/A     Occupational History   • Not on file.     Social History Main Topics   • Smoking status: Never Smoker   • Smokeless tobacco: Never Used   • Alcohol use Yes      Comment: social   • Drug use: No   • Sexual activity: Not on file     Other Topics Concern   • Not on file     Social History Narrative   • No narrative on file           REVIEW OF SYSTEMS  Review of Systems   Constitutional: Negative for chills and fever.   Respiratory: Negative for shortness of breath.    Cardiovascular: Negative for chest pain.   Gastrointestinal: Negative for abdominal pain, nausea and vomiting.   Musculoskeletal: Positive for arthralgias (Left 4th toe) and gait problem.   Neurological: Negative for weakness and numbness.           PHYSICAL EXAM  ED Triage Vitals [09/21/18 0607]   Temp Heart Rate Resp BP SpO2   97.5 °F (36.4 °C) 81 18 -- 98 %      Temp src Heart Rate Source Patient Position BP Location FiO2 (%)   Tympanic -- -- -- --     Physical Exam   Constitutional: He is oriented to person, place, and time and well-developed, well-nourished, and in no distress.   Cardiovascular: Normal rate, regular rhythm and normal heart sounds.    Pulmonary/Chest: Breath sounds normal. No respiratory distress.   Musculoskeletal:   Diffuse tenderness and swelling to the left proximal fourth toe.  No deformity.  No nail injury.  Skin intact.  Neurovascularly intact distally.  Remainder of foot is non-tender.  Left ankle is nontender.   Neurological: He is alert and oriented to person, place, and time.   Skin: Skin is warm and dry.   Psychiatric: Affect and judgment normal.   Nursing note and vitals reviewed.      RADIOLOGY  XR Foot 3+ View Left   Final Result      Nondisplaced  "fracture of proximal 4th phalanx.  Questionable proximal 5th phalanx fracture, though pt nontender.     I ordered the above noted radiological studies and reviewed the images on the PACS system.      PROCEDURES  Procedures  4th and 3rd toes Alfredo Taped by myself.       COURSE & MEDICAL DECISION MAKING  Pertinent Labs and Imaging studies that were ordered and reviewed are noted above.  Results were reviewed/discussed with the patient and they were also made aware of online assess.  Pt also made aware that some labs, such as cultures, will not be resulted during ER visit and follow up with PMD is necessary.         PROGRESS AND CONSULTS    Progress Notes:    0630 Reviewed pt's history and workup with Dr. Garcia (ER physician).  After a bedside evaluation, Dr. Garcia agrees with the plan of care    0650 The patient's history, physical exam, and lab findings were discussed with the physician, who also performed a face to face history and physical exam.  I discussed all results and noted any abnormalities with patient.  Discussed absoute need to recheck abnormalities with their family physician.  I answered any of the patient's questions.  Discussed plan for discharge, as there is no emergent indication for admission.  Pt is agreeable and understands need for follow up and repeat testing.  Pt is aware that discharge does not mean that nothing is wrong but it indicates no emergency is present and they must continue care with their family physician.  Pt is discharged with instructions to follow up with primary care doctor to have their blood pressure rechecked.     Disposition vitals:  /68 (BP Location: Right arm, Patient Position: Sitting)   Pulse 64   Temp 97.5 °F (36.4 °C) (Tympanic)   Resp 16   Ht 182.9 cm (72\")   SpO2 96%         DIAGNOSIS  Final diagnoses:   Unspecified fracture of left toe(s), initial encounter for closed fracture         DISPOSITION  Discharged      FOLLOW UP   Nany Real, " MD  [address] [id number] [phone]      As needed          RX     Medication List      Changed    losartan 50 MG tablet  Commonly known as:  COZAAR  TAKE ONE TABLET BY MOUTH TWO TIMES A DAY  What changed:  See the new instructions.                        Inocente Natarajan, PA  09/21/18 0712

## 2018-09-21 NOTE — ED PROVIDER NOTES
MD ATTESTATION NOTE    Pt presents to the ED complaining of left fourth toe pain that began earlier this morning. Pt states that his pain began after he accidentally struck his left toes on a bedpost. Pt states that his pain is worse with ambulation and denies additional injury. Pt's L foot XR shows a fracture of the proximal phalanx of the left fourth toe. On exam, the pt has tenderness without deformity to the left fourth toe. I agree with the plan to zack tape the pt's toes and to discharge the pt home.    The MARGRET and I have discussed this patient's history, physical exam, and treatment plan.  I have reviewed the documentation and personally had a face to face interaction with the patient. I affirm the documentation and agree with the treatment and plan.  The attached note describes my personal findings.    Documentation assistance provided by lalo Pennington for Dr. Garcia.  Information recorded by the scribe was done at my direction and has been verified and validated by me.       Aria Pennington  09/21/18 0632       Corey Garcia MD  09/21/18 0752

## 2018-09-21 NOTE — ED NOTES
Pt states he hit rt 4th toe on bed post this am.  C/o pain to toe with ambulation.     Liane Lazo RN  09/21/18 9044

## 2018-09-21 NOTE — ED NOTES
"Pt walked to triage with steady gait. Pt states \" I think I broke my toe\". Pt hit his foot on a bed post. pt reports pain on 4th toe of left foot.      Michelle Kc RN  09/21/18 0607    "

## 2018-09-28 ENCOUNTER — HOSPITAL ENCOUNTER (OUTPATIENT)
Dept: CARDIOLOGY | Facility: HOSPITAL | Age: 78
Discharge: HOME | End: 2018-09-28
Attending: INTERNAL MEDICINE | Admitting: INTERNAL MEDICINE

## 2018-09-28 VITALS
DIASTOLIC BLOOD PRESSURE: 80 MMHG | SYSTOLIC BLOOD PRESSURE: 144 MMHG | BODY MASS INDEX: 26.82 KG/M2 | HEART RATE: 56 BPM | HEIGHT: 72 IN | WEIGHT: 198 LBS

## 2018-09-28 DIAGNOSIS — Z98.890 S/P MVR (MITRAL VALVE REPAIR): ICD-10-CM

## 2018-09-28 DIAGNOSIS — Z95.2 S/P AVR (AORTIC VALVE REPLACEMENT): ICD-10-CM

## 2018-09-28 LAB
AORTIC DIMENSIONLESS INDEX: 0.6 (DI)
BH CV ECHO MEAS - ACS: 1.2 CM
BH CV ECHO MEAS - AO MAX PG (FULL): 9.2 MMHG
BH CV ECHO MEAS - AO MAX PG: 12.8 MMHG
BH CV ECHO MEAS - AO MEAN PG (FULL): 4.3 MMHG
BH CV ECHO MEAS - AO MEAN PG: 6.6 MMHG
BH CV ECHO MEAS - AO ROOT AREA (BSA CORRECTED): 1.6
BH CV ECHO MEAS - AO ROOT AREA: 9.3 CM^2
BH CV ECHO MEAS - AO ROOT DIAM: 3.4 CM
BH CV ECHO MEAS - AO V2 MAX: 179 CM/SEC
BH CV ECHO MEAS - AO V2 MEAN: 124.1 CM/SEC
BH CV ECHO MEAS - AO V2 VTI: 40.6 CM
BH CV ECHO MEAS - AVA(I,A): 1.8 CM^2
BH CV ECHO MEAS - AVA(I,D): 1.8 CM^2
BH CV ECHO MEAS - AVA(V,A): 1.6 CM^2
BH CV ECHO MEAS - AVA(V,D): 1.6 CM^2
BH CV ECHO MEAS - BSA(HAYCOCK): 2.1 M^2
BH CV ECHO MEAS - BSA: 2.1 M^2
BH CV ECHO MEAS - BZI_BMI: 26.9 KILOGRAMS/M^2
BH CV ECHO MEAS - BZI_METRIC_HEIGHT: 182.9 CM
BH CV ECHO MEAS - BZI_METRIC_WEIGHT: 89.8 KG
BH CV ECHO MEAS - EDV(CUBED): 172.8 ML
BH CV ECHO MEAS - EDV(MOD-SP2): 78 ML
BH CV ECHO MEAS - EDV(MOD-SP4): 83 ML
BH CV ECHO MEAS - EDV(TEICH): 151.8 ML
BH CV ECHO MEAS - EF(CUBED): 81.2 %
BH CV ECHO MEAS - EF(MOD-BP): 61 %
BH CV ECHO MEAS - EF(MOD-SP2): 57.7 %
BH CV ECHO MEAS - EF(MOD-SP4): 61.4 %
BH CV ECHO MEAS - EF(TEICH): 73.3 %
BH CV ECHO MEAS - ESV(CUBED): 32.4 ML
BH CV ECHO MEAS - ESV(MOD-SP2): 33 ML
BH CV ECHO MEAS - ESV(MOD-SP4): 32 ML
BH CV ECHO MEAS - ESV(TEICH): 40.6 ML
BH CV ECHO MEAS - FS: 42.8 %
BH CV ECHO MEAS - IVS/LVPW: 1
BH CV ECHO MEAS - IVSD: 1.2 CM
BH CV ECHO MEAS - LAT PEAK E' VEL: 9 CM/SEC
BH CV ECHO MEAS - LV DIASTOLIC VOL/BSA (35-75): 39.1 ML/M^2
BH CV ECHO MEAS - LV MASS(C)D: 269 GRAMS
BH CV ECHO MEAS - LV MASS(C)DI: 126.8 GRAMS/M^2
BH CV ECHO MEAS - LV MAX PG: 3.6 MMHG
BH CV ECHO MEAS - LV MEAN PG: 2.3 MMHG
BH CV ECHO MEAS - LV SYSTOLIC VOL/BSA (12-30): 15.1 ML/M^2
BH CV ECHO MEAS - LV V1 MAX: 94.5 CM/SEC
BH CV ECHO MEAS - LV V1 MEAN: 70.7 CM/SEC
BH CV ECHO MEAS - LV V1 VTI: 23.1 CM
BH CV ECHO MEAS - LVIDD: 5.6 CM
BH CV ECHO MEAS - LVIDS: 3.2 CM
BH CV ECHO MEAS - LVLD AP2: 7 CM
BH CV ECHO MEAS - LVLD AP4: 7.4 CM
BH CV ECHO MEAS - LVLS AP2: 6.5 CM
BH CV ECHO MEAS - LVLS AP4: 5.5 CM
BH CV ECHO MEAS - LVOT AREA (M): 3.1 CM^2
BH CV ECHO MEAS - LVOT AREA: 3.1 CM^2
BH CV ECHO MEAS - LVOT DIAM: 2 CM
BH CV ECHO MEAS - LVPWD: 1.2 CM
BH CV ECHO MEAS - MED PEAK E' VEL: 5 CM/SEC
BH CV ECHO MEAS - MV A DUR: 0.14 SEC
BH CV ECHO MEAS - MV A MAX VEL: 139.1 CM/SEC
BH CV ECHO MEAS - MV DEC SLOPE: 254 CM/SEC^2
BH CV ECHO MEAS - MV DEC TIME: 0.31 SEC
BH CV ECHO MEAS - MV E MAX VEL: 107 CM/SEC
BH CV ECHO MEAS - MV E/A: 0.77
BH CV ECHO MEAS - MV MAX PG: 9.9 MMHG
BH CV ECHO MEAS - MV MEAN PG: 3.6 MMHG
BH CV ECHO MEAS - MV P1/2T MAX VEL: 114 CM/SEC
BH CV ECHO MEAS - MV P1/2T: 131.5 MSEC
BH CV ECHO MEAS - MV V2 MAX: 157.5 CM/SEC
BH CV ECHO MEAS - MV V2 MEAN: 86.5 CM/SEC
BH CV ECHO MEAS - MV V2 VTI: 52.4 CM
BH CV ECHO MEAS - MVA P1/2T LCG: 1.9 CM^2
BH CV ECHO MEAS - MVA(P1/2T): 1.7 CM^2
BH CV ECHO MEAS - MVA(VTI): 1.4 CM^2
BH CV ECHO MEAS - PA MAX PG (FULL): 7.5 MMHG
BH CV ECHO MEAS - PA MAX PG: 8.4 MMHG
BH CV ECHO MEAS - PA V2 MAX: 145.2 CM/SEC
BH CV ECHO MEAS - PULM A REVS DUR: 0.11 SEC
BH CV ECHO MEAS - PULM A REVS VEL: 23.9 CM/SEC
BH CV ECHO MEAS - PULM DIAS VEL: 44.2 CM/SEC
BH CV ECHO MEAS - PULM S/D: 0.71
BH CV ECHO MEAS - PULM SYS VEL: 31.6 CM/SEC
BH CV ECHO MEAS - PVA(V,A): 1.4 CM^2
BH CV ECHO MEAS - PVA(V,D): 1.4 CM^2
BH CV ECHO MEAS - QP/QS: 0.8
BH CV ECHO MEAS - RAP SYSTOLE: 3 MMHG
BH CV ECHO MEAS - RV MAX PG: 0.92 MMHG
BH CV ECHO MEAS - RV MEAN PG: 0.65 MMHG
BH CV ECHO MEAS - RV V1 MAX: 48 CM/SEC
BH CV ECHO MEAS - RV V1 MEAN: 38.8 CM/SEC
BH CV ECHO MEAS - RV V1 VTI: 13.8 CM
BH CV ECHO MEAS - RVOT AREA: 4.1 CM^2
BH CV ECHO MEAS - RVOT DIAM: 2.3 CM
BH CV ECHO MEAS - RVSP: 25 MMHG
BH CV ECHO MEAS - SI(AO): 177.3 ML/M^2
BH CV ECHO MEAS - SI(CUBED): 66.2 ML/M^2
BH CV ECHO MEAS - SI(LVOT): 33.8 ML/M^2
BH CV ECHO MEAS - SI(MOD-SP2): 21.2 ML/M^2
BH CV ECHO MEAS - SI(MOD-SP4): 24 ML/M^2
BH CV ECHO MEAS - SI(TEICH): 52.4 ML/M^2
BH CV ECHO MEAS - SUP REN AO DIAM: 2 CM
BH CV ECHO MEAS - SV(AO): 376.2 ML
BH CV ECHO MEAS - SV(CUBED): 140.4 ML
BH CV ECHO MEAS - SV(LVOT): 71.7 ML
BH CV ECHO MEAS - SV(MOD-SP2): 45 ML
BH CV ECHO MEAS - SV(MOD-SP4): 51 ML
BH CV ECHO MEAS - SV(RVOT): 57.1 ML
BH CV ECHO MEAS - SV(TEICH): 111.2 ML
BH CV ECHO MEAS - TAPSE (>1.6): 1.5 CM2
BH CV ECHO MEAS - TR MAX VEL: 236.2 CM/SEC
BH CV ECHO MEASUREMENTS AVERAGE E/E' RATIO: 15.29
BH CV XLRA - RV BASE: 3.3 CM
BH CV XLRA - TDI S': 8 CM/SEC
LEFT ATRIUM VOLUME INDEX: 34 ML/M2

## 2018-09-28 PROCEDURE — 93306 TTE W/DOPPLER COMPLETE: CPT

## 2018-09-28 PROCEDURE — 93306 TTE W/DOPPLER COMPLETE: CPT | Performed by: INTERNAL MEDICINE

## 2018-09-28 NOTE — PROGRESS NOTES
I spoke with the patient.  He is still having swelling.  His proBNP was normal and his echo looks fine.  I would not recommend increase diuretics.  I told him if he doesn't notice improvement over the next couple weeks that he should be referred to the lymphedema clinic

## 2018-10-12 RX ORDER — AMOXICILLIN 500 MG/1
2000 CAPSULE ORAL SEE ADMIN INSTRUCTIONS
Qty: 4 CAPSULE | Refills: 3 | Status: SHIPPED | OUTPATIENT
Start: 2018-10-12 | End: 2019-08-14

## 2018-10-15 ENCOUNTER — TELEPHONE (OUTPATIENT)
Dept: CARDIOLOGY | Facility: CLINIC | Age: 78
End: 2018-10-15

## 2018-10-15 NOTE — TELEPHONE ENCOUNTER
Patient wanted you to know that Dr. Garvey his kidney doctor increased his torsemide from 10mg to 20mg once daily  Has swollen ankles  Wanted to update you on this.

## 2018-10-22 RX ORDER — FUROSEMIDE 40 MG/1
TABLET ORAL
Qty: 90 TABLET | Refills: 0 | OUTPATIENT
Start: 2018-10-22

## 2018-11-26 RX ORDER — CARVEDILOL 6.25 MG/1
TABLET ORAL
Qty: 180 TABLET | Refills: 0 | Status: SHIPPED | OUTPATIENT
Start: 2018-11-26 | End: 2019-02-22 | Stop reason: SDUPTHER

## 2018-11-27 ENCOUNTER — TELEPHONE (OUTPATIENT)
Dept: CARDIOLOGY | Facility: CLINIC | Age: 78
End: 2018-11-27

## 2018-11-27 DIAGNOSIS — R60.0 LOWER EXTREMITY EDEMA: Primary | ICD-10-CM

## 2018-11-27 NOTE — TELEPHONE ENCOUNTER
I called him back. He said he is never short of breath since his heart surgery.  So I put the referral in for the Lymphedema Clinic. Can you co-sign it when you get a chance.    Thanks,  Lyric

## 2018-11-27 NOTE — TELEPHONE ENCOUNTER
Pt called about the letter he sent.  Also see message from Cara on 10/15/18.    He said he is still having foot swelling even with the increase in his torsemide from 10mg to 20mg qd. This is even with using compression stockings as you recommended.  He said you had mentioned a possible referral to the Lymphedema Clinic downstairs.  Please advise.    Thanks,  Lyric    On 9/17/18:    4.  Bilateral lower extremity edema: Likely venous insufficiency.              -I'm going to repeat the transthoracic echocardiogram and order a proBNP to evaluate              -Recommend moderate pressure compression stockings.

## 2018-12-19 ENCOUNTER — HOSPITAL ENCOUNTER (OUTPATIENT)
Dept: PHYSICAL THERAPY | Facility: HOSPITAL | Age: 78
Setting detail: THERAPIES SERIES
Discharge: HOME | End: 2018-12-19

## 2018-12-19 DIAGNOSIS — I89.0 LYMPHEDEMA: Primary | ICD-10-CM

## 2018-12-19 PROCEDURE — G8987 SELF CARE CURRENT STATUS: HCPCS

## 2018-12-19 PROCEDURE — G8988 SELF CARE GOAL STATUS: HCPCS

## 2018-12-19 PROCEDURE — 97161 PT EVAL LOW COMPLEX 20 MIN: CPT

## 2018-12-19 NOTE — THERAPY EVALUATION
Physical Therapy Lymphedema Initial Evaluation  [facility]     Patient Name: Vj Bright  : 1940  MRN: [id number]  Today's Date: 2018      Visit Date: 2018    Visit Dx:    ICD-10-CM ICD-9-CM   1. Lymphedema I89.0 457.1       Patient Active Problem List   Diagnosis   • S/P AVR (aortic valve replacement)   • S/P MVR (mitral valve repair)   • Coronary artery disease involving native coronary artery of native heart without angina pectoris   • Essential hypertension        Past Medical History:   Diagnosis Date   • Acute respiratory failure (CMS/[facility])    • Aortic stenosis    • BPH (benign prostatic hyperplasia)    • CAD (coronary artery disease)    • Chest pain    • CHF (congestive heart failure) (CMS/[facility])    • Diabetes mellitus (CMS/[facility])    • Gout    • Hyperlipidemia    • Hypertension    • PAF (paroxysmal atrial fibrillation) (CMS/[facility])    • Pulmonary hypertension    • STEMI (ST elevation myocardial infarction) (CMS/[facility])         Past Surgical History:   Procedure Laterality Date   • AORTIC VALVE REPAIR/REPLACEMENT     • CARDIAC SURGERY     • CARDIAC SURGERY     • CORONARY ANGIOPLASTY WITH STENT PLACEMENT     • MITRAL VALVE REPAIR/REPLACEMENT     • OTHER SURGICAL HISTORY      AORTIC VALVE REPAIR OF SUBVALVULAR AORTIC STENOSIS   • OTHER SURGICAL HISTORY      CARDIAC CATH PROCEDURE OUTCOME: SUCCESSFUL   • OTHER SURGICAL HISTORY      VENOUS THROMBECTOMY BY COMBINED LEG INCISION       Visit Dx:    ICD-10-CM ICD-9-CM   1. Lymphedema I89.0 457.1       Patient History     Row Name 18 1200             History    Chief Complaint  Swelling  -PC      Date Current Problem(s) Began  18  -PC      Brief Description of Current Complaint  Pt states he has had swelling in both ankles for about 3 months.  His dr changed his meds but swelling persists. He wears compression socks, but is unsure what strength they are.  Other medical history includes, DM, HTN, chronic kidney disease stage 3, aortic  valve replacement 6/2015.  -PC      Patient/Caregiver Goals  Decrease swelling;Know what to do to help the symptoms  -PC      How has patient tried to help current problem?  Compression stockings  -PC         Pain     Pain at Present  0  -PC      Pain at Best  0  -PC      Pain at Worst  0  -PC      What Performance Factors Make the Current Problem(s) WORSE?  Swelling worsens as day goes on.  -PC      What Performance Factors Make the Current Problem(s) BETTER?  elevation, swelling is  better in the am.  -PC      Difficulties at work?  Able to do regular job  -PC      Difficulties with ADL's?  Cannot fit into dress shoes.  -PC      Difficulties with recreational activities?  Able to cont to play tennis.  -PC         Fall Risk Assessment    Any falls in the past year:  No  -PC         Services    Are you currently receiving Home Health services  No  -PC         Daily Activities    Primary Language  English  -PC      How does patient learn best?  Listening;Reading  -PC      Teaching needs identified  Management of Condition;Home Exercise Program  -PC      Does patient have problems with the following?  None  -PC      Barriers to learning  None  -PC      Functional Status  dressing  -PC      Explanation of Functional Status Problem  can't fit into dress shoes  -PC      Pt Participated in POC and Goals  Yes  -PC         Safety    Are you being hurt, hit, or frightened by anyone at home or in your life?  No  -PC      Are you being neglected by a caregiver  No  -PC        User Key  (r) = Recorded By, (t) = Taken By, (c) = Cosigned By    Initials Name Provider Type    Claudia Frias PT Physical Therapist          Lymphedema     Row Name 12/19/18 1200             Subjective Pain    Able to rate subjective pain?  yes  -PC      Pre-Treatment Pain Level  0  -PC      Post-Treatment Pain Level  0  -PC         Lymphedema Assessment    Lymphedema Classification  RLE:;LLE:;stage 2 (Spontaneously Irreversible)  -PC       Infections or Cellulitis?  no  -PC         General ROM    GENERAL ROM COMMENTS  WFL  -PC         Lymphedema Edema Assessment    Ptting Edema Category  By grade out of 4  -PC      Pitting Edema  + 3/4;+ 1/4 (+1 of 4) 3+ ankles, 1+ along ant tibia  -PC      Edema Assessment Comment  Mod edema both ankles, mid edema ant tibia  -PC         Skin Changes/Observations    Skin Observations Comment  very dry  -PC         Lymphedema Sensation    Lymphedema Sensation Tests  light touch  -PC      Lymphedema Light Touch  WNL  -PC         Lymphedema Measurements    Measurement Type(s)  Circumferential  -PC      Circumferential Areas  Lower extremities  -PC         BLE Circumferential (cm)    Measurement Location 1  Knee  -PC      Left 1  38.8 cm  -PC      Right 1  38 cm  -PC      Measurement Location 2  10cm below knee  -PC      Left 2  37.2 cm  -PC      Right 2  36 cm  -PC      Measurement Location 3  20cm below knee  -PC      Left 3  33 cm  -PC      Right 3  33 cm  -PC      Measurement Location 4  30cm below knee  -PC      Left 4  27 cm  -PC      Right 4  26.5 cm  -PC      Measurement Location 5  Ankle (smallest)  -PC      Left 5  24.7 cm  -PC      Right 5  22.8 cm  -PC      Measurement Location 6  Ankle (largest)  -PC      Left 6  29.8 cm  -PC      Right 6  28 cm  -PC      Measurement Location 7  Midfoot  -PC      Left 7  24.5 cm  -PC      Right 7  24 cm  -PC      LLE Circumferential Total  215 cm  -PC      RLE Circumferential Total  208.3 cm  -PC        User Key  (r) = Recorded By, (t) = Taken By, (c) = Cosigned By    Initials Name Provider Type    Claudia Frias, PT Physical Therapist                          Therapy Education  Education Details: Reviewed treatment program and plan of care.  Given: Other (comment)  Program: New  How Provided: Verbal, Written(Issued written lymphedema info.)  Provided to: Patient  Level of Understanding: Verbalized      Exercises     Row Name 12/19/18 1200             Subjective Pain     Able to rate subjective pain?  yes  -PC      Pre-Treatment Pain Level  0  -PC      Post-Treatment Pain Level  0  -PC        User Key  (r) = Recorded By, (t) = Taken By, (c) = Cosigned By    Initials Name Provider Type    Claudia Frias, PT Physical Therapist                        PT OP Goals     Row Name 12/19/18 1200          PT Short Term Goals    STG Date to Achieve  01/02/19  -PC     STG 1  Pt demo awareness of condition and precautions for improved prevention, management, care of symptoms, and ease of transition to self-care of condition.  -PC     STG 1 Progress  New  -PC     STG 2  Pt/family independent with self-wrapping techniques of compression bandages as indicated for improved self-management of condition.  -PC     STG 2 Progress  New  -PC     STG 3  Pt demo decreased net edema of >/=3-7cm for decreased edema symptoms, decreased risk of infection, and improved skin care.  -PC     STG 3 Progress  New  -PC        Long Term Goals    LTG Date to Achieve  01/19/19  -PC     LTG 1  Pt/family independent with self-care techniques for self-management of condition.  -PC     LTG 1 Progress  New  -PC     LTG 2  Pt demo decreased net edema of >/=7-10cm for decreased edema symptoms, decreased risk of infection, and improved skin care.  -PC     LTG 2 Progress  New  -PC     LTG 3  Pt/family independent with compression garments as indicated for self-management of condition.  -PC     LTG 3 Progress  New  -PC        Time Calculation    PT Goal Re-Cert Due Date  03/19/19  -PC       User Key  (r) = Recorded By, (t) = Taken By, (c) = Cosigned By    Initials Name Provider Type    Claudia Frias, PT Physical Therapist          PT Assessment/Plan     Row Name 12/19/18 1229          PT Assessment    Functional Limitations  Other (comment) Difficulty fitting into shoes.  -PC     Impairments  Edema;Impaired lymphatic circulation  -PC     Assessment Comments  Pt is a 78 yr old male who presents with mod edema in both ankles  and min edema along ant tibias.  He has very dry skin and 3+ pitting at ankles.  Pt is able to do most functional activity, but has trouble fitting into shoes.  Pt wears compression stockings, but is unsure of the amount of compression.  Other medical problems incl Chronic kidney disease, aortic valve replacement, HTN, diabetes. Pt is a good candidate for treatment to decrease the swelling in his ankles and to learn self-care techniques.  -PC     Please refer to paper survey for additional self-reported information  Yes  -PC     Rehab Potential  Good  -PC     Patient/caregiver participated in establishment of treatment plan and goals  Yes  -PC     Patient would benefit from skilled therapy intervention  Yes  -PC        PT Plan    PT Frequency  3x/week  -PC     Predicted Duration of Therapy Intervention (Therapy Eval)  4 weeks  -PC     Planned CPT's?  PT EVAL LOW COMPLEXITY: 13273;PT MANUAL THERAPY EA 15 MIN: 82617;PT SELF CARE/HOME MGMT/TRAIN EA 15: 78807  -PC     Physical Therapy Interventions (Optional Details)  bandaging;home exercise program;manual lymphatic drainage;patient/family education  -PC     PT Plan Comments  See POC.  -PC       User Key  (r) = Recorded By, (t) = Taken By, (c) = Cosigned By    Initials Name Provider Type    PC Claudia Mckinney, PT Physical Therapist                       Time Calculation:   Start Time: 1045  Stop Time: 1129  Time Calculation (min): 44 min   Therapy Suggested Charges     Code   Minutes Charges    None           Therapy Charges for Today     Code Description Service Date Service Provider Modifiers Qty    10085497736  PT SELFCARE CURRENT 12/19/2018 Claudia Mckinney, PT GP, CI 1    [id number] HC PT SELFCARE PROJECTED 12/19/2018 Claudia Mckinney, PT GP,  1    [id number]  PT EVAL LOW COMPLEXITY 3 12/19/2018 Claudia Mckinney, PT GP 1          PT G-Codes  PT Professional Judgement Used?: Yes  Functional Limitation: Self care  Self Care Current Status (): At least 1  percent but less than 20 percent impaired, limited or restricted  Self Care Goal Status (): 0 percent impaired, limited or restricted         Claudia Mckinney, PT  12/19/2018

## 2019-01-18 ENCOUNTER — OFFICE VISIT (OUTPATIENT)
Dept: CARDIOLOGY | Facility: CLINIC | Age: 79
End: 2019-01-18

## 2019-01-18 VITALS
WEIGHT: 193 LBS | SYSTOLIC BLOOD PRESSURE: 134 MMHG | DIASTOLIC BLOOD PRESSURE: 76 MMHG | HEART RATE: 69 BPM | BODY MASS INDEX: 26.14 KG/M2 | HEIGHT: 72 IN

## 2019-01-18 DIAGNOSIS — I10 ESSENTIAL HYPERTENSION: ICD-10-CM

## 2019-01-18 DIAGNOSIS — Z95.2 S/P AVR (AORTIC VALVE REPLACEMENT): Primary | ICD-10-CM

## 2019-01-18 DIAGNOSIS — I25.10 CORONARY ARTERY DISEASE INVOLVING NATIVE CORONARY ARTERY OF NATIVE HEART WITHOUT ANGINA PECTORIS: ICD-10-CM

## 2019-01-18 PROCEDURE — 99214 OFFICE O/P EST MOD 30 MIN: CPT | Performed by: INTERNAL MEDICINE

## 2019-01-18 PROCEDURE — 93000 ELECTROCARDIOGRAM COMPLETE: CPT | Performed by: INTERNAL MEDICINE

## 2019-01-18 NOTE — PROGRESS NOTES
Vj Bright  1940  Date of Office Visit: 1/18/19  Encounter Provider: Liborio Varghese MD  Place of Service: [facility] CARDIOLOGY    Chief complaint  1. Followup aortic valve replacement with 25 mm bovine pericardial valve.   2. Mitral valve repair with flexible band.  3. Resection of subaortic stenosis.   4. STEMI.   5. Coronary artery disease.     History of Present Illness  Dr. Real,  I had the pleasure of seeing your patient back in follow up today. He is a pleasant 78-year-old gentleman who presented initially with a ST-elevation MI in and had a stent placed to his left circumflex coronary artery in 3/2015. He was found to have severe aortic stenosis on that hospitalization. He also had paroxysmal atrial fibrillation and associated with acute on chronic diastolic heart failure.    He was referred to Dr. Kim secondary to a mean gradient across his aortic valve of 58 mmHg and a valve area of 0.4 cm2.  He was admitted and underwent aortic valve replacement with 23 mm Milagros Graf bovine pericardial valve, mitral valve repair with 25 mm [employer] flexible band and resection of subaortic stenosis. After CABG, he had paroxysmal atrial fibrillation and required IV amiodarone at increasing doses with return to sinus rhythm. He had some rebleeding and was taken back for evacuation of clot and addressing of a smaller arterial bleeder. He did have some very mild expressive aphasia around the time of the operation, but this has resolved. He has had no recurrence of atrial fibrillation.  His amiodarone and Coumadin therapy have previously been stopped.       Since our last visit, he has actually been doing very well. He still complains of bilateral lower extremity edema that improves with elevation. He has been evaluated in the lymphedema clinic; however, per his report he has not been back for therapy at this point in time.         Review of Systems   Constitution:  Negative for fever, weakness and malaise/fatigue.   HENT: Negative for nosebleeds and sore throat.    Eyes: Negative for blurred vision and double vision.   Cardiovascular: Negative for chest pain, claudication, palpitations and syncope.   Respiratory: Negative for cough, shortness of breath and snoring.    Endocrine: Negative for cold intolerance, heat intolerance and polydipsia.   Skin: Negative for itching, poor wound healing and rash.   Musculoskeletal: Negative for joint pain, joint swelling, muscle weakness and myalgias.   Gastrointestinal: Negative for abdominal pain, melena, nausea and vomiting.   Neurological: Negative for light-headedness, loss of balance, seizures and vertigo.   Psychiatric/Behavioral: Negative for altered mental status and depression.       Past Medical History:   Diagnosis Date   • Acute respiratory failure (CMS/[facility])    • Aortic stenosis    • BPH (benign prostatic hyperplasia)    • CAD (coronary artery disease)    • Chest pain    • CHF (congestive heart failure) (CMS/[facility])    • Diabetes mellitus (CMS/[facility])    • Gout    • Hyperlipidemia    • Hypertension    • PAF (paroxysmal atrial fibrillation) (CMS/[facility])    • Pulmonary hypertension (CMS/[facility])    • STEMI (ST elevation myocardial infarction) (CMS/[facility])        The following portions of the patient's history were reviewed and updated as appropriate: Social history , Family history and Surgical history     Current Outpatient Medications on File Prior to Visit   Medication Sig Dispense Refill   • ACCU-CHEK BRANDON PLUS test strip TEST DAILY AS DIRECTED  1   • ACCU-CHEK SOFTCLIX LANCETS lancets TEST DAILY AS DIRECTED  1   • amoxicillin (AMOXIL) 500 MG capsule Take 4 capsules by mouth See Admin Instructions. 4 capsules 1 hour prior to procedure 4 capsule 3   • aspirin 81 MG EC tablet Take 81 mg by mouth Daily.     • atorvastatin (LIPITOR) 40 MG tablet TAKE ONE TABLET BY MOUTH EVERY NIGHT AT BEDTIME 90 tablet 3   • carvedilol (COREG) 6.25 MG tablet  "TAKE ONE TABLET BY MOUTH TWICE A DAY WITH MEALS 180 tablet 0   • Cholecalciferol (VITAMIN D-3) 1000 UNITS capsule Take 1 capsule by mouth daily.     • CloNIDine (CATAPRES) 0.1 MG tablet 1 tablet Every 12 (Twelve) Hours.     • losartan (COZAAR) 50 MG tablet TAKE ONE TABLET BY MOUTH TWO TIMES A DAY (Patient taking differently: TAKES  TWO TABLETS ONCE DAILY) 180 tablet 1   • Omega-3 Fatty Acids (FISH OIL) 1000 MG capsule capsule Take 1 capsule by mouth daily.     • SitaGLIPtin-MetFORMIN HCl -1000 MG tablet sustained-release 24 hour Take 1 tablet by mouth daily.     • tamsulosin (FLOMAX) 0.4 MG capsule 24 hr capsule Take 2 capsules by mouth daily.     • torsemide (DEMADEX) 10 MG tablet 20 mg Every Morning.     • ULORIC 40 MG tablet Take 1 tablet by mouth Daily.  3     No current facility-administered medications on file prior to visit.        No Known Allergies    Vitals:    01/18/19 1517   BP: 134/76   Pulse: 69   Weight: 87.5 kg (193 lb)   Height: 182.9 cm (72\")     Physical Exam   Constitutional: He is oriented to person, place, and time. He appears well-developed and well-nourished.   HENT:   Head: Normocephalic and atraumatic.   Eyes: Conjunctivae and EOM are normal. No scleral icterus.   Neck: Normal range of motion. Neck supple. Normal carotid pulses, no hepatojugular reflux and no JVD present. Carotid bruit is not present. No tracheal deviation present. No thyromegaly present.   Cardiovascular: Normal rate and regular rhythm. Exam reveals no gallop and no friction rub.   No murmur heard.  Pulses:       Carotid pulses are 2+ on the right side, and 2+ on the left side.       Radial pulses are 2+ on the right side, and 2+ on the left side.        Femoral pulses are 2+ on the right side, and 2+ on the left side.       Dorsalis pedis pulses are 2+ on the right side, and 2+ on the left side.        Posterior tibial pulses are 2+ on the right side, and 2+ on the left side.   Pulmonary/Chest: Breath sounds normal. " No respiratory distress. He has no decreased breath sounds. He has no wheezes. He has no rhonchi. He has no rales. He exhibits no tenderness.   Abdominal: Soft. Bowel sounds are normal. He exhibits no distension. There is no tenderness. There is no rebound.   Musculoskeletal: He exhibits edema (1+ bilateral LE). He exhibits no deformity.   Neurological: He is alert and oriented to person, place, and time. He has normal strength. No sensory deficit.   Skin: No rash noted. No erythema.   Sternotomy     Psychiatric: He has a normal mood and affect. His behavior is normal.       No components found for: CBC  No results found for: CMP  No components found for: LIPID  No results found for: BMP      ECG 12 Lead  Date/Time: 1/18/2019 4:22 PM  Performed by: Lbiorio Varghese MD  Authorized by: Liborio Varghese MD   Comparison: compared with previous ECG from 9/17/2018  Similar to previous ECG  Rhythm: sinus rhythm  Rate: normal  QRS axis: normal  Clinical impression: non-specific ECG  Comments: Nonspecific T-wave abnormalities diffuse leads          9/28/18  · Left ventricular systolic function is normal.  · The left ventricular cavity is mildly dilated.  · Left ventricular diastolic dysfunction (grade I) consistent with impaired relaxation.  · Left atrial cavity size is mildly dilated.  · There is a prosthetic aortic valve.  · Aortic valve mean pressure gradient is 6.6 mmHg.  · Aortic valve maximum pressure gradient is 12.8 mmHg.  · There is a mitral valve ring present.  · Mild mitral valve regurgitation is present  · Mild tricuspid valve regurgitation is present.  · Calculated right ventricular systolic pressure from tricuspid regurgitation is 25 mmHg    DISCUSSION/SUMMARY  78-year-old gentleman with a medical history as documented above including a presentation in 2015 with an inferior lateral STEMI and drug-eluting stent to the left circumflex coronary artery, severe aortic valve stenosis with a bioprosthetic  aortic valve replacement as documented above, mitral valve repair, resection of subaortic stenosis, and paroxysmal atrial fibrillation perioperatively who presents back to me for follow-up.  He continues to have very mild bilateral lower extremity edema and has been evaluated by the lymphedema clinic.  He is not volume overloaded.  Overall he states he feels great.    1. Coronary disease, previous inferior lateral STEMI with percutaneous intervention to the left circumflex coronary artery disease: No angina.  Patient is doing very well.      -Continue his current regimen, which includes aspirin, carvedilol, losartan, and atorvastatin.    2. Aortic valve replacement, bioprosthetic.      -Continue aspirin.  Follow up echocardiogram after operation has been performed showing normal prosthetic valve functioning.    3.  Essential hypertension: This is better controlled.  I would recommend no changes at this time.  4.  Bilateral lower extremity edema: Likely venous insufficiency.   -Compression stockings are recommended.  Continue to follow with the lymphedema clinic.    Coronary Artery Disease  Assessment  • The patient has no angina    Plan  • Lifestyle modifications discussed include adhering to a heart healthy diet, avoidance of tobacco products, maintenance of a healthy weight, medication compliance and regular monitoring of cholesterol and blood pressure    Subjective - Objective  • There has been a previous stent procedure using MARIE  • Current antiplatelet therapy includes aspirin 81 mg

## 2019-02-04 ENCOUNTER — HOSPITAL ENCOUNTER (OUTPATIENT)
Dept: PHYSICAL THERAPY | Facility: HOSPITAL | Age: 79
Setting detail: THERAPIES SERIES
Discharge: HOME | End: 2019-02-04

## 2019-02-04 DIAGNOSIS — I89.0 LYMPHEDEMA: Primary | ICD-10-CM

## 2019-02-04 PROCEDURE — 97140 MANUAL THERAPY 1/> REGIONS: CPT

## 2019-02-04 NOTE — THERAPY TREATMENT NOTE
Outpatient Physical Therapy Lymphedema Treatment Note  [facility]     Patient Name: Vj Bright  : 1940  MRN: [id number]  Today's Date: 2019        Visit Date: 2019    Visit Dx:    ICD-10-CM ICD-9-CM   1. Lymphedema I89.0 457.1       Patient Active Problem List   Diagnosis   • S/P AVR (aortic valve replacement)   • S/P MVR (mitral valve repair)   • Coronary artery disease involving native coronary artery of native heart without angina pectoris   • Essential hypertension        Lymphedema     Row Name 19 1500             Subjective Pain    Able to rate subjective pain?  yes  -PC      Pre-Treatment Pain Level  0  -PC      Post-Treatment Pain Level  0  -PC         Subjective Comments    Subjective Comments  Thinks his swelling is a little better.  -PC         Lymphedema Edema Assessment    Edema Assessment Comment  Mod edema both ankles, min edema ant tibia  -PC         Skin Changes/Observations    Skin Observations Comment  min dry (pt has been using lotion)  -PC         Lymphedema Measurements    Measurement Type(s)  Circumferential  -PC      Circumferential Areas  Lower extremities  -PC         BLE Circumferential (cm)    Measurement Location 1  Knee  -PC      Left 1  37.2 cm  -PC      Right 1  37.2 cm  -PC      Measurement Location 2  10cm below knee  -PC      Left 2  36.3 cm  -PC      Right 2  35.5 cm  -PC      Measurement Location 3  20cm below knee  -PC      Left 3  32.2 cm  -PC      Right 3  31.6 cm  -PC      Measurement Location 4  30cm below knee  -PC      Left 4  26 cm  -PC      Right 4  25 cm  -PC      Measurement Location 5  Ankle (smallest)  -PC      Left 5  22.8 cm  -PC      Right 5  21.6 cm  -PC      Measurement Location 6  Ankle (largest)  -PC      Left 6  28 cm  -PC      Right 6  26.8 cm  -PC      Measurement Location 7  Midfoot  -PC      Left 7  24.2 cm  -PC      Right 7  23.4 cm  -PC      LLE Circumferential Total  206.7 cm  -PC      RLE Circumferential Total  201.1  cm  -PC         Manual Lymphatic Drainage    Manual Lymphatic Drainage Comments  B inguinal, BLE's  -PC         Compression/Skin Care    Skin Care  moisturizing lotion applied Eucerin lotion  -PC      Wrapping Location  lower extremity  -PC      Wrapping Location LE  bilateral:;foot to knee  -PC      Bandaging Comments  Tg9, artiflex, short stretch bandages 1-8cm, 2-10cm.    -PC        User Key  (r) = Recorded By, (t) = Taken By, (c) = Cosigned By    Initials Name Provider Type    Claudia Frias, PT Physical Therapist                        PT Assessment/Plan     Row Name 02/04/19 1603          PT Assessment    Assessment Comments  Pt returns to begin treatment.  His msmts have decreased since initial eval.  He has been wearing compression stockings daily.  -PC        PT Plan    PT Plan Comments  Cont per POC.  -PC       User Key  (r) = Recorded By, (t) = Taken By, (c) = Cosigned By    Initials Name Provider Type    PC Claudia Mckinney, PT Physical Therapist               Exercises     Row Name 02/04/19 1604 02/04/19 1500          Subjective Comments    Subjective Comments  --  Thinks his swelling is a little better.  -PC        Subjective Pain    Able to rate subjective pain?  --  yes  -PC     Pre-Treatment Pain Level  --  0  -PC     Post-Treatment Pain Level  --  0  -PC        Total Minutes    88345 - PT Manual Therapy Minutes  60  -PC  --       User Key  (r) = Recorded By, (t) = Taken By, (c) = Cosigned By    Initials Name Provider Type    Claudia Frias, PT Physical Therapist                        PT OP Goals     Row Name 02/04/19 1600          PT Short Term Goals    STG Date to Achieve  02/18/19  -PC     STG 1  Pt demo awareness of condition and precautions for improved prevention, management, care of symptoms, and ease of transition to self-care of condition.  -PC     STG 1 Progress  New  -PC     STG 2  Pt/family independent with self-wrapping techniques of compression bandages as indicated for  improved self-management of condition.  -PC     STG 2 Progress  New  -PC     STG 3  Pt demo decreased net edema of >/=3-7cm for decreased edema symptoms, decreased risk of infection, and improved skin care.  -PC     STG 3 Progress  New  -PC        Long Term Goals    LTG Date to Achieve  03/04/19  -PC     LTG 1  Pt/family independent with self-care techniques for self-management of condition.  -PC     LTG 1 Progress  New  -PC     LTG 2  Pt demo decreased net edema of >/=7-10cm for decreased edema symptoms, decreased risk of infection, and improved skin care.  -PC     LTG 2 Progress  New  -PC     LTG 3  Pt/family independent with compression garments as indicated for self-management of condition.  -PC     LTG 3 Progress  New  -PC       User Key  (r) = Recorded By, (t) = Taken By, (c) = Cosigned By    Initials Name Provider Type    Claudia Frias, PT Physical Therapist          Therapy Education  Education Details: Reviewed bandaging and precautions.  Given: Bandaging/dressing change  Program: New  How Provided: Verbal, Demonstration  Provided to: Patient  Level of Understanding: Verbalized              Time Calculation:   Start Time: 1415  Stop Time: 1515  Time Calculation (min): 60 min  Total Timed Code Minutes- PT: 60 minute(s)   Therapy Suggested Charges     Code   Minutes Charges    54315 (CPT®) Hc Pt Neuromusc Re Education Ea 15 Min      82799 (CPT®) Hc Pt Ther Proc Ea 15 Min      54386 (CPT®) Hc Gait Training Ea 15 Min      69883 (CPT®) Hc Pt Therapeutic Act Ea 15 Min      69272 (CPT®) Hc Pt Manual Therapy Ea 15 Min 60 4    74100 (CPT®) Hc Pt Ther Massage- Per 15 Min      86209 (CPT®) Hc Pt Iontophoresis Ea 15 Min      37143 (CPT®) Hc Pt Elec Stim Ea-Per 15 Min      19530 (CPT®) Hc Pt Ultrasound Ea 15 Min      90503 (CPT®) Hc Pt Self Care/Mgmt/Train Ea 15 Min      77323 (CPT®) Hc Pt Prosthetic (S) Train Initial Encounter, Each 15 Min      84936 (CPT®) Hc Orthotic(S) Mgmt/Train Initial Encounter, Each 15min       16265 (CPT®) Hc Pt Aquatic Therapy Ea 15 Min      77471 (CPT®) Hc Pt Orthotic(S)/Prosthetic(S) Encounter, Each 15 Min       (CPT®) Hc Pt Electrical Stim Unattended      Total  60 4        Therapy Charges for Today     Code Description Service Date Service Provider Modifiers Qty    42286244440 HC PT MANUAL THERAPY EA 15 MIN 2/4/2019 Claudia Mckinney, PT GP 4                    Claudia Mckinney, PT  2/4/2019

## 2019-02-06 ENCOUNTER — HOSPITAL ENCOUNTER (OUTPATIENT)
Dept: PHYSICAL THERAPY | Facility: HOSPITAL | Age: 79
Setting detail: THERAPIES SERIES
Discharge: HOME | End: 2019-02-06

## 2019-02-06 DIAGNOSIS — I89.0 LYMPHEDEMA: Primary | ICD-10-CM

## 2019-02-06 PROCEDURE — 97140 MANUAL THERAPY 1/> REGIONS: CPT

## 2019-02-06 NOTE — THERAPY TREATMENT NOTE
Outpatient Physical Therapy Lymphedema Treatment Note  [facility]     Patient Name: Vj Bright  : 1940  MRN: [id number]  Today's Date: 2019        Visit Date: 2019    Visit Dx:    ICD-10-CM ICD-9-CM   1. Lymphedema I89.0 457.1       Patient Active Problem List   Diagnosis   • S/P AVR (aortic valve replacement)   • S/P MVR (mitral valve repair)   • Coronary artery disease involving native coronary artery of native heart without angina pectoris   • Essential hypertension        Lymphedema     Row Name 19 1600             Subjective Pain    Able to rate subjective pain?  yes  -PC      Pre-Treatment Pain Level  0  -PC      Post-Treatment Pain Level  0  -PC         Subjective Comments    Subjective Comments  States he had no problems with the bandages.  Thinks his ankles already look better.  -PC         Lymphedema Edema Assessment    Edema Assessment Comment  Ankles appear decreased in size.  -PC         Manual Lymphatic Drainage    Manual Lymphatic Drainage Comments  B inguinal, BLE's  -PC         Compression/Skin Care    Compression/Skin Care  remove bandages  -PC      Skin Care  washed/dried;moisturizing lotion applied Eucerin  -PC      Wrapping Location  lower extremity  -PC      Wrapping Location LE  bilateral:;foot to knee  -PC      Bandaging Comments  Tg9, artiflex, short stretch bandages 1-8cm, 2-10cm.    -PC        User Key  (r) = Recorded By, (t) = Taken By, (c) = Cosigned By    Initials Name Provider Type    PC Claudia Mckinney, PT Physical Therapist                        PT Assessment/Plan     Row Name 19 1612          PT Assessment    Assessment Comments  Pt tolerated bandages well, ankles appear smaller.  -PC        PT Plan    PT Plan Comments  Cont.  -PC       User Key  (r) = Recorded By, (t) = Taken By, (c) = Cosigned By    Initials Name Provider Type    Claudia Frias, PT Physical Therapist               Exercises     Row Name 19 1614 19 1600           Subjective Comments    Subjective Comments  --  States he had no problems with the bandages.  Thinks his ankles already look better.  -PC        Subjective Pain    Able to rate subjective pain?  --  yes  -PC     Pre-Treatment Pain Level  --  0  -PC     Post-Treatment Pain Level  --  0  -PC        Total Minutes    88538 - PT Manual Therapy Minutes  61  -PC  --       User Key  (r) = Recorded By, (t) = Taken By, (c) = Cosigned By    Initials Name Provider Type    PC Claudia Mckinney, PT Physical Therapist                            Therapy Education  Given: Bandaging/dressing change  Program: Reinforced  How Provided: Verbal, Demonstration  Provided to: Patient  Level of Understanding: Verbalized              Time Calculation:   Start Time: 1415  Stop Time: 1516  Time Calculation (min): 61 min  Total Timed Code Minutes- PT: 61 minute(s)   Therapy Suggested Charges     Code   Minutes Charges    57308 (CPT®) Hc Pt Neuromusc Re Education Ea 15 Min      83505 (CPT®) Hc Pt Ther Proc Ea 15 Min      89039 (CPT®) Hc Gait Training Ea 15 Min      95782 (CPT®) Hc Pt Therapeutic Act Ea 15 Min      30991 (CPT®) Hc Pt Manual Therapy Ea 15 Min 61 4    90347 (CPT®) Hc Pt Ther Massage- Per 15 Min      33727 (CPT®) Hc Pt Iontophoresis Ea 15 Min      33349 (CPT®) Hc Pt Elec Stim Ea-Per 15 Min      97914 (CPT®) Hc Pt Ultrasound Ea 15 Min      16030 (CPT®) Hc Pt Self Care/Mgmt/Train Ea 15 Min      92931 (CPT®) Hc Pt Prosthetic (S) Train Initial Encounter, Each 15 Min      87409 (CPT®) Hc Orthotic(S) Mgmt/Train Initial Encounter, Each 15min      80154 (CPT®) Hc Pt Aquatic Therapy Ea 15 Min      20837 (CPT®) Hc Pt Orthotic(S)/Prosthetic(S) Encounter, Each 15 Min       (CPT®) Hc Pt Electrical Stim Unattended      Total  61 4        Therapy Charges for Today     Code Description Service Date Service Provider Modifiers Qty    68942812891 HC PT MANUAL THERAPY EA 15 MIN 2/6/2019 Claudia Mckinney, PT GP 4                    Claudia NAVA.  Hank, PT  2/6/2019

## 2019-02-08 ENCOUNTER — HOSPITAL ENCOUNTER (OUTPATIENT)
Dept: PHYSICAL THERAPY | Facility: HOSPITAL | Age: 79
Setting detail: THERAPIES SERIES
Discharge: HOME | End: 2019-02-08

## 2019-02-08 DIAGNOSIS — I89.0 LYMPHEDEMA: Primary | ICD-10-CM

## 2019-02-08 PROCEDURE — 97140 MANUAL THERAPY 1/> REGIONS: CPT

## 2019-02-08 NOTE — THERAPY TREATMENT NOTE
Outpatient Physical Therapy Lymphedema Treatment Note  [facility]     Patient Name: Vj Bright  : 1940  MRN: [id number]  Today's Date: 2019        Visit Date: 2019    Visit Dx:    ICD-10-CM ICD-9-CM   1. Lymphedema I89.0 457.1       Patient Active Problem List   Diagnosis   • S/P AVR (aortic valve replacement)   • S/P MVR (mitral valve repair)   • Coronary artery disease involving native coronary artery of native heart without angina pectoris   • Essential hypertension        Lymphedema     Row Name 19 1700             Subjective Pain    Able to rate subjective pain?  yes  -PC      Pre-Treatment Pain Level  0  -PC      Post-Treatment Pain Level  0  -PC         Subjective Comments    Subjective Comments  States he liked the stockings he used before- [employer] casual.  -PC         Lymphedema Edema Assessment    Edema Assessment Comment  Noted increased wrinkles and loose skin around ankles indicating decreased fluid.  -PC         Lymphedema Measurements    Measurement Type(s)  Circumferential  -PC      Circumferential Areas  Lower extremities  -PC         BLE Circumferential (cm)    Measurement Location 1  Knee  -PC      Left 1  37 cm  -PC      Right 1  36.7 cm  -PC      Measurement Location 2  10cm below knee  -PC      Left 2  34.5 cm  -PC      Right 2  34 cm  -PC      Measurement Location 3  20cm below knee  -PC      Left 3  31.1 cm  -PC      Right 3  30.3 cm  -PC      Measurement Location 4  30cm below knee  -PC      Left 4  24.8 cm  -PC      Right 4  24.3 cm  -PC      Measurement Location 5  Ankle (smallest)  -PC      Left 5  22 cm  -PC      Right 5  21.6 cm  -PC      Measurement Location 6  Ankle (largest)  -PC      Left 6  27.5 cm  -PC      Right 6  26.3 cm  -PC      Measurement Location 7  Midfoot  -PC      Left 7  23.2 cm  -PC      Right 7  22.5 cm  -PC      LLE Circumferential Total  200.1 cm  -PC      RLE Circumferential Total  195.7 cm  -PC         Manual Lymphatic  Drainage    Manual Lymphatic Drainage Comments  B inguinal, BLE's  -PC         Compression/Skin Care    Compression/Skin Care  remove bandages  -PC      Skin Care  washed/dried;moisturizing lotion applied  -PC      Wrapping Location  lower extremity  -PC      Wrapping Location LE  bilateral:;foot to knee  -PC      Bandaging Comments  Tg9, artiflex, short stretch bandages 1-8cm, 2-10cm.    -PC        User Key  (r) = Recorded By, (t) = Taken By, (c) = Cosigned By    Initials Name Provider Type    Claudia Frias, PT Physical Therapist                        PT Assessment/Plan     Row Name 02/08/19 1725          PT Assessment    Assessment Comments  Msmts have decreased by 5.4cm on right and by 6.6cm on left this week.  Overall down by 12.6cm on right and 14.9cm on left. Will be ready to order stockings early next week.  -PC        PT Plan    PT Plan Comments  Cont.  -PC       User Key  (r) = Recorded By, (t) = Taken By, (c) = Cosigned By    Initials Name Provider Type    Claudia Frias, PT Physical Therapist               Exercises     Row Name 02/08/19 1732 02/08/19 1700          Subjective Comments    Subjective Comments  --  States he liked the stockings he used before- [employer] casual.  -PC        Subjective Pain    Able to rate subjective pain?  --  yes  -PC     Pre-Treatment Pain Level  --  0  -PC     Post-Treatment Pain Level  --  0  -PC        Total Minutes    85317 - PT Manual Therapy Minutes  60  -PC  --       User Key  (r) = Recorded By, (t) = Taken By, (c) = Cosigned By    Initials Name Provider Type    Claudia Frias, PT Physical Therapist                            Therapy Education  Education Details: Discussed stocking options, pt used [employer] in the past and would like to use those again.  Given: Edema management  Program: Reinforced  How Provided: Verbal  Provided to: Patient  Level of Understanding: Verbalized              Time Calculation:   Start Time: 1420  Stop Time:  1520  Time Calculation (min): 60 min  Total Timed Code Minutes- PT: 60 minute(s)   Therapy Suggested Charges     Code   Minutes Charges    57811 (CPT®) Hc Pt Neuromusc Re Education Ea 15 Min      56108 (CPT®) Hc Pt Ther Proc Ea 15 Min      78029 (CPT®) Hc Gait Training Ea 15 Min      81277 (CPT®) Hc Pt Therapeutic Act Ea 15 Min      85814 (CPT®) Hc Pt Manual Therapy Ea 15 Min 60 4    62451 (CPT®) Hc Pt Ther Massage- Per 15 Min      87346 (CPT®) Hc Pt Iontophoresis Ea 15 Min      78127 (CPT®) Hc Pt Elec Stim Ea-Per 15 Min      08599 (CPT®) Hc Pt Ultrasound Ea 15 Min      11574 (CPT®) Hc Pt Self Care/Mgmt/Train Ea 15 Min      74975 (CPT®) Hc Pt Prosthetic (S) Train Initial Encounter, Each 15 Min      14984 (CPT®) Hc Orthotic(S) Mgmt/Train Initial Encounter, Each 15min      46130 (CPT®) Hc Pt Aquatic Therapy Ea 15 Min      21684 (CPT®) Hc Pt Orthotic(S)/Prosthetic(S) Encounter, Each 15 Min       (CPT®) Hc Pt Electrical Stim Unattended      Total  60 4        Therapy Charges for Today     Code Description Service Date Service Provider Modifiers Qty    93780240634 HC PT MANUAL THERAPY EA 15 MIN 2/8/2019 Claudia Mckinney, PT GP 4                    Claudia Mckinney, PT  2/8/2019

## 2019-02-11 ENCOUNTER — APPOINTMENT (OUTPATIENT)
Dept: PHYSICAL THERAPY | Facility: HOSPITAL | Age: 79
End: 2019-02-11

## 2019-02-11 RX ORDER — LOSARTAN POTASSIUM 50 MG/1
TABLET ORAL
Qty: 60 TABLET | Refills: 0 | Status: SHIPPED | OUTPATIENT
Start: 2019-02-11 | End: 2019-03-13 | Stop reason: SDUPTHER

## 2019-02-13 ENCOUNTER — HOSPITAL ENCOUNTER (OUTPATIENT)
Dept: PHYSICAL THERAPY | Facility: HOSPITAL | Age: 79
Setting detail: THERAPIES SERIES
Discharge: HOME | End: 2019-02-13

## 2019-02-13 DIAGNOSIS — I89.0 LYMPHEDEMA: Primary | ICD-10-CM

## 2019-02-13 PROCEDURE — 97140 MANUAL THERAPY 1/> REGIONS: CPT

## 2019-02-13 NOTE — THERAPY TREATMENT NOTE
Outpatient Physical Therapy Lymphedema Treatment Note  [facility]     Patient Name: Vj Bright  : 1940  MRN: [id number]  Today's Date: 2019        Visit Date: 2019    Visit Dx:    ICD-10-CM ICD-9-CM   1. Lymphedema I89.0 457.1       Patient Active Problem List   Diagnosis   • S/P AVR (aortic valve replacement)   • S/P MVR (mitral valve repair)   • Coronary artery disease involving native coronary artery of native heart without angina pectoris   • Essential hypertension        Lymphedema     Row Name 19 1600             Subjective Pain    Able to rate subjective pain?  yes  -PC      Pre-Treatment Pain Level  0  -PC      Post-Treatment Pain Level  0  -PC         Subjective Comments    Subjective Comments  States his legs almost look normal. Wonders if he will be able to get his regular shoes on now.  -PC         Lymphedema Edema Assessment    Edema Assessment Comment  Min edema post ankles.  -PC         Manual Lymphatic Drainage    Manual Lymphatic Drainage Comments  B inguinal, BLE's  -PC         Compression/Skin Care    Compression/Skin Care  remove bandages  -PC      Skin Care  washed/dried;moisturizing lotion applied Eucerin  -PC      Wrapping Location  lower extremity  -PC      Wrapping Location LE  bilateral:;foot to knee  -PC      Bandaging Comments  Tg9, artiflex, short stretch bandages 1-8cm, 2-10cm.    -PC        User Key  (r) = Recorded By, (t) = Taken By, (c) = Cosigned By    Initials Name Provider Type    PC Claudia Mckinney, PT Physical Therapist                        PT Assessment/Plan     Row Name 19 1632          PT Assessment    Assessment Comments  Pt's legs cont to improve. Ready to try regular shoes to see if they fit.  -PC        PT Plan    PT Plan Comments  Cont.  -PC       User Key  (r) = Recorded By, (t) = Taken By, (c) = Cosigned By    Initials Name Provider Type    PC Claudia Mckinney, PT Physical Therapist               Exercises     Row Name  02/13/19 1635 02/13/19 1600          Subjective Comments    Subjective Comments  --  States his legs almost look normal. Wonders if he will be able to get his regular shoes on now.  -PC        Subjective Pain    Able to rate subjective pain?  --  yes  -PC     Pre-Treatment Pain Level  --  0  -PC     Post-Treatment Pain Level  --  0  -PC        Total Minutes    79502 - PT Manual Therapy Minutes  65  -PC  --       User Key  (r) = Recorded By, (t) = Taken By, (c) = Cosigned By    Initials Name Provider Type    Claudia Frias PT Physical Therapist                       Manual Rx (last 36 hours)      Manual Treatments     Row Name 02/13/19 1635             Total Minutes    52432 - PT Manual Therapy Minutes  65  -PC        User Key  (r) = Recorded By, (t) = Taken By, (c) = Cosigned By    Initials Name Provider Type    Claudia Frias PT Physical Therapist              Therapy Education  Education Details: Discussed pt bringing in regular shoes next visit to see if they fit now.  Given: Symptoms/condition management  Program: Reinforced  How Provided: Verbal  Provided to: Patient  Level of Understanding: Verbalized              Time Calculation:   Start Time: 1410  Stop Time: 1515  Time Calculation (min): 65 min  Total Timed Code Minutes- PT: 65 minute(s)   Therapy Suggested Charges     Code   Minutes Charges    60247 (CPT®) Hc Pt Neuromusc Re Education Ea 15 Min      74096 (CPT®) Hc Pt Ther Proc Ea 15 Min      65858 (CPT®) Hc Gait Training Ea 15 Min      83075 (CPT®) Hc Pt Therapeutic Act Ea 15 Min      31256 (CPT®) Hc Pt Manual Therapy Ea 15 Min 65 4    28758 (CPT®) Hc Pt Ther Massage- Per 15 Min      48687 (CPT®) Hc Pt Iontophoresis Ea 15 Min      02163 (CPT®) Hc Pt Elec Stim Ea-Per 15 Min      34969 (CPT®) Hc Pt Ultrasound Ea 15 Min      46034 (CPT®) Hc Pt Self Care/Mgmt/Train Ea 15 Min      95377 (CPT®) Hc Pt Prosthetic (S) Train Initial Encounter, Each 15 Min      14681 (CPT®) Hc Orthotic(S) Mgmt/Train  Initial Encounter, Each 15min      67478 (CPT®) Hc Pt Aquatic Therapy Ea 15 Min      22898 (CPT®) Hc Pt Orthotic(S)/Prosthetic(S) Encounter, Each 15 Min       (CPT®) Hc Pt Electrical Stim Unattended      Total  65 4        Therapy Charges for Today     Code Description Service Date Service Provider Modifiers Qty    23273272802 HC PT MANUAL THERAPY EA 15 MIN 2/13/2019 Claudia Mckinney, PT GP 4                    Claudia Mckinney, PT  2/13/2019

## 2019-02-15 ENCOUNTER — HOSPITAL ENCOUNTER (OUTPATIENT)
Dept: PHYSICAL THERAPY | Facility: HOSPITAL | Age: 79
Setting detail: THERAPIES SERIES
Discharge: HOME | End: 2019-02-15

## 2019-02-15 DIAGNOSIS — I89.0 LYMPHEDEMA: Primary | ICD-10-CM

## 2019-02-15 PROCEDURE — 97140 MANUAL THERAPY 1/> REGIONS: CPT

## 2019-02-15 NOTE — THERAPY TREATMENT NOTE
Outpatient Physical Therapy Lymphedema Treatment Note  [facility]     Patient Name: Vj Bright  : 1940  MRN: [id number]  Today's Date: 2/15/2019        Visit Date: 02/15/2019    Visit Dx:    ICD-10-CM ICD-9-CM   1. Lymphedema I89.0 457.1       Patient Active Problem List   Diagnosis   • S/P AVR (aortic valve replacement)   • S/P MVR (mitral valve repair)   • Coronary artery disease involving native coronary artery of native heart without angina pectoris   • Essential hypertension        Lymphedema     Row Name 02/15/19 1600             Subjective Pain    Able to rate subjective pain?  yes  -PC      Pre-Treatment Pain Level  0  -PC      Post-Treatment Pain Level  0  -PC         Subjective Comments    Subjective Comments  States the real test will be if he can get his dress shoes on.  -PC         Lymphedema Measurements    Measurement Type(s)  Circumferential  -PC      Circumferential Areas  Lower extremities  -PC         BLE Circumferential (cm)    Measurement Location 1  Knee  -PC      Left 1  37 cm  -PC      Right 1  36.5 cm  -PC      Measurement Location 2  10cm below knee  -PC      Left 2  33.7 cm  -PC      Right 2  33 cm  -PC      Measurement Location 3  20cm below knee  -PC      Left 3  30.2 cm  -PC      Right 3  29.6 cm  -PC      Measurement Location 4  30cm below knee  -PC      Left 4  24.5 cm  -PC      Right 4  23.3 cm  -PC      Measurement Location 5  Ankle (smallest)  -PC      Left 5  22 cm  -PC      Right 5  21.4 cm  -PC      Measurement Location 6  Ankle (largest)  -PC      Left 6  27.5 cm  -PC      Right 6  26.3 cm  -PC      Measurement Location 7  Midfoot  -PC      Left 7  22.8 cm  -PC      Right 7  22 cm  -PC      LLE Circumferential Total  197.7 cm  -PC      RLE Circumferential Total  192.1 cm  -PC         Manual Lymphatic Drainage    Manual Lymphatic Drainage Comments  B inguinal, BLE's  -PC         Compression/Skin Care    Compression/Skin Care  remove bandages  -PC      Skin  Care  washed/dried;moisturizing lotion applied Eucerin  -PC      Wrapping Location  lower extremity  -PC      Wrapping Location LE  bilateral:;foot to knee  -PC      Bandaging Comments  Tg9, artiflex, short stretch bandages 1-8cm, 2-10cm.    -PC        User Key  (r) = Recorded By, (t) = Taken By, (c) = Cosigned By    Initials Name Provider Type    Claudia Frias, PT Physical Therapist                        PT Assessment/Plan     Row Name 02/15/19 1628          PT Assessment    Assessment Comments  Pt's msmts have decreased by 9cm on each leg.  He was able to fit into his dress shoes easily.    -PC        PT Plan    PT Plan Comments  Pt to  compression stockings and bring them in next visit for fitting.  -PC       User Key  (r) = Recorded By, (t) = Taken By, (c) = Cosigned By    Initials Name Provider Type    PC Claudia Mckinney, PT Physical Therapist               Exercises     Row Name 02/15/19 1632 02/15/19 1600          Subjective Comments    Subjective Comments  --  States the real test will be if he can get his dress shoes on.  -PC        Subjective Pain    Able to rate subjective pain?  --  yes  -PC     Pre-Treatment Pain Level  --  0  -PC     Post-Treatment Pain Level  --  0  -PC        Total Minutes    97295 - PT Manual Therapy Minutes  70  -PC  --       User Key  (r) = Recorded By, (t) = Taken By, (c) = Cosigned By    Initials Name Provider Type    Claudia Frias, PT Physical Therapist                       Manual Rx (last 36 hours)      Manual Treatments     Row Name 02/15/19 1632             Total Minutes    11512 - PT Manual Therapy Minutes  70  -PC        User Key  (r) = Recorded By, (t) = Taken By, (c) = Cosigned By    Initials Name Provider Type    Claudia Frias, PT Physical Therapist          PT OP Goals     Row Name 02/15/19 1600          PT Short Term Goals    STG Date to Achieve  02/18/19  -PC     STG 1  Pt demo awareness of condition and precautions for improved prevention,  management, care of symptoms, and ease of transition to self-care of condition.  -PC     STG 1 Progress  Met  -PC     STG 2  Pt/family independent with self-wrapping techniques of compression bandages as indicated for improved self-management of condition.  -PC     STG 2 Progress  Partially Met  -PC     STG 2 Progress Comments  Pt has re-wrapped loose bandages, but has not completely wrapped each leg yet.  -PC     STG 3  Pt demo decreased net edema of >/=3-7cm for decreased edema symptoms, decreased risk of infection, and improved skin care.  -PC     STG 3 Progress  Met  -PC     STG 3 Progress Comments  Msmts have decreased by 9cm on each leg.  -PC        Long Term Goals    LTG Date to Achieve  03/04/19  -PC     LTG 1  Pt/family independent with self-care techniques for self-management of condition.  -PC     LTG 1 Progress  Ongoing  -PC     LTG 2  Pt demo decreased net edema of >/=7-10cm for decreased edema symptoms, decreased risk of infection, and improved skin care.  -PC     LTG 2 Progress  Met  -PC     LTG 3  Pt/family independent with compression garments as indicated for self-management of condition.  -PC     LTG 3 Progress  Ongoing  -PC        Time Calculation    PT Goal Re-Cert Due Date  03/19/19  -PC       User Key  (r) = Recorded By, (t) = Taken By, (c) = Cosigned By    Initials Name Provider Type    Claudia Frias PT Physical Therapist          Therapy Education  Education Details: Pt tried on his compression stockings (they appear to be 15-20mmHg) with his dress shoes.  His dress shoes fit easily and had extra room. Recommended pt get Jobst Casual 20-30mmHg size large/tall.    Given: Other (comment)  Program: Reinforced  How Provided: Verbal, Written(Written stocking recommendation for [employer])  Provided to: Patient  Level of Understanding: Verbalized              Time Calculation:   Start Time: 1415  Stop Time: 1525  Time Calculation (min): 70 min  Total Timed Code Minutes- PT: 70 minute(s)    Therapy Suggested Charges     Code   Minutes Charges    78091 (CPT®) Hc Pt Neuromusc Re Education Ea 15 Min      29530 (CPT®) Hc Pt Ther Proc Ea 15 Min      66430 (CPT®) Hc Gait Training Ea 15 Min      64520 (CPT®) Hc Pt Therapeutic Act Ea 15 Min      65292 (CPT®) Hc Pt Manual Therapy Ea 15 Min 70 5    21232 (CPT®) Hc Pt Ther Massage- Per 15 Min      09563 (CPT®) Hc Pt Iontophoresis Ea 15 Min      63579 (CPT®) Hc Pt Elec Stim Ea-Per 15 Min      58226 (CPT®) Hc Pt Ultrasound Ea 15 Min      72846 (CPT®) Hc Pt Self Care/Mgmt/Train Ea 15 Min      85696 (CPT®) Hc Pt Prosthetic (S) Train Initial Encounter, Each 15 Min      46084 (CPT®) Hc Orthotic(S) Mgmt/Train Initial Encounter, Each 15min      02256 (CPT®) Hc Pt Aquatic Therapy Ea 15 Min      70397 (CPT®) Hc Pt Orthotic(S)/Prosthetic(S) Encounter, Each 15 Min       (CPT®) Hc Pt Electrical Stim Unattended      Total  70 5        Therapy Charges for Today     Code Description Service Date Service Provider Modifiers Qty    87448073110 HC PT MANUAL THERAPY EA 15 MIN 2/15/2019 Claudia Mckinney, PT GP 5                    Claudia Mckinney, PT  2/15/2019

## 2019-02-18 ENCOUNTER — HOSPITAL ENCOUNTER (OUTPATIENT)
Dept: PHYSICAL THERAPY | Facility: HOSPITAL | Age: 79
Setting detail: THERAPIES SERIES
Discharge: HOME | End: 2019-02-18

## 2019-02-18 DIAGNOSIS — I89.0 LYMPHEDEMA: Primary | ICD-10-CM

## 2019-02-18 PROCEDURE — 97535 SELF CARE MNGMENT TRAINING: CPT

## 2019-02-18 PROCEDURE — 97140 MANUAL THERAPY 1/> REGIONS: CPT

## 2019-02-18 NOTE — THERAPY DISCHARGE NOTE
Outpatient Physical Therapy Lymphedema Treatment Note/Discharge Summary  [facility]     Patient Name: Vj Bright  : 1940  MRN: [id number]  Today's Date: 2019      Visit Date: 2019    Visit Dx:    ICD-10-CM ICD-9-CM   1. Lymphedema I89.0 457.1       Patient Active Problem List   Diagnosis   • S/P AVR (aortic valve replacement)   • S/P MVR (mitral valve repair)   • Coronary artery disease involving native coronary artery of native heart without angina pectoris   • Essential hypertension        Lymphedema     Row Name 19 1600             Subjective Pain    Able to rate subjective pain?  yes  -PC      Pre-Treatment Pain Level  0  -PC      Post-Treatment Pain Level  0  -PC         Subjective Comments    Subjective Comments  States they had the stockings in stock so he brought them in.  -PC         Lymphedema Edema Assessment    Edema Assessment Comment  No obvious edema.  -PC         Skin Changes/Observations    Skin Observations Comment  In tact.  -PC         Lymphedema Measurements    Measurement Type(s)  --  -PC      Circumferential Areas  --  -PC         Manual Lymphatic Drainage    Manual Lymphatic Drainage Comments  B inguinal, BLE's  -PC         Compression/Skin Care    Compression/Skin Care  remove bandages  -PC      Skin Care  washed/dried  -PC      Compression/Skin Care Comments  Applied pt's compression stockings.  See pt. ed. info.  -PC        User Key  (r) = Recorded By, (t) = Taken By, (c) = Cosigned By    Initials Name Provider Type    Claudia Frias, PT Physical Therapist                        PT Assessment/Plan     Row Name 19 6183          PT Assessment    Assessment Comments  Pt has no obvious edema at this time.  He was independent with don/doff of stockings.  He demo understanding of self-care. His msmts have decreased by 9cm on each leg. He is ready to continue independently.  -PC        PT Plan    PT Plan Comments  D/C.  -PC       User Key  (r) =  Recorded By, (t) = Taken By, (c) = Cosigned By    Initials Name Provider Type    PC Claudia Mckinney, PT Physical Therapist               Exercises     Row Name 02/18/19 1700 02/18/19 1600          Subjective Comments    Subjective Comments  --  States they had the stockings in stock so he brought them in.  -PC        Subjective Pain    Able to rate subjective pain?  --  yes  -PC     Pre-Treatment Pain Level  --  0  -PC     Post-Treatment Pain Level  --  0  -PC        Total Minutes    75578 - PT Manual Therapy Minutes  45  -PC  --       User Key  (r) = Recorded By, (t) = Taken By, (c) = Cosigned By    Initials Name Provider Type    PC Claudia Mckinney, PT Physical Therapist                       Manual Rx (last 36 hours)      Manual Treatments     Row Name 02/18/19 1700             Total Minutes    53826 - PT Manual Therapy Minutes  45  -PC        User Key  (r) = Recorded By, (t) = Taken By, (c) = Cosigned By    Initials Name Provider Type    PC Claudia Mckinney, PT Physical Therapist          PT OP Goals     Row Name 02/18/19 1600          PT Short Term Goals    STG 1  Pt demo awareness of condition and precautions for improved prevention, management, care of symptoms, and ease of transition to self-care of condition.  -PC     STG 1 Progress  Met  -PC     STG 2  Pt/family independent with self-wrapping techniques of compression bandages as indicated for improved self-management of condition.  -PC     STG 2 Progress  Partially Met  -PC     STG 3  Pt demo decreased net edema of >/=3-7cm for decreased edema symptoms, decreased risk of infection, and improved skin care.  -PC     STG 3 Progress  Met  -PC        Long Term Goals    LTG 1  Pt/family independent with self-care techniques for self-management of condition.  -PC     LTG 1 Progress  Met  -PC     LTG 2  Pt demo decreased net edema of >/=7-10cm for decreased edema symptoms, decreased risk of infection, and improved skin care.  -PC     LTG 2 Progress  Met  -PC      LTG 3  Pt/family independent with compression garments as indicated for self-management of condition.  -PC     LTG 3 Progress  Met  -PC       User Key  (r) = Recorded By, (t) = Taken By, (c) = Cosigned By    Initials Name Provider Type    PC Claudia Mckinney, PT Physical Therapist          Therapy Education  Education Details: Applied pt's new compression stockings, [employer] for men casual 20-30mmHg size large tall. Pt practiced don/doff using gripper gloves.  Given: Edema management, Symptoms/condition management  Program: Reinforced  How Provided: Verbal, Demonstration  Provided to: Patient  Level of Understanding: Verbalized, Demonstrated(Pt able to don/doff stockings.)  77786 - PT Self Care/Mgmt Minutes: 15              Time Calculation:   Start Time: 1420  Stop Time: 1520  Time Calculation (min): 60 min  Total Timed Code Minutes- PT: 45 minute(s)   Therapy Suggested Charges     Code   Minutes Charges    40395 (CPT®) Hc Pt Neuromusc Re Education Ea 15 Min      05135 (CPT®) Hc Pt Ther Proc Ea 15 Min      42330 (CPT®) Hc Gait Training Ea 15 Min      46585 (CPT®) Hc Pt Therapeutic Act Ea 15 Min      92622 (CPT®) Hc Pt Manual Therapy Ea 15 Min 45 3    75365 (CPT®) Hc Pt Ther Massage- Per 15 Min      45526 (CPT®) Hc Pt Iontophoresis Ea 15 Min      68893 (CPT®) Hc Pt Elec Stim Ea-Per 15 Min      99160 (CPT®) Hc Pt Ultrasound Ea 15 Min      82624 (CPT®) Hc Pt Self Care/Mgmt/Train Ea 15 Min 15 1    23345 (CPT®) Hc Pt Prosthetic (S) Train Initial Encounter, Each 15 Min      97841 (CPT®) Hc Orthotic(S) Mgmt/Train Initial Encounter, Each 15min      13960 (CPT®) Hc Pt Aquatic Therapy Ea 15 Min      68533 (CPT®) Hc Pt Orthotic(S)/Prosthetic(S) Encounter, Each 15 Min       (CPT®) Hc Pt Electrical Stim Unattended      Total  60 4        Therapy Charges for Today     Code Description Service Date Service Provider Modifiers Qty    94457920747 HC PT MANUAL THERAPY EA 15 MIN 2/18/2019 Claudia Mckinney, PT GP 3    33809472168 HC PT  SELF CARE/MGMT/TRAIN EA 15 MIN 2/18/2019 Claudia Mckinney, PT GP 1                 OP PT Discharge Summary  Date of Discharge: 02/18/19  Reason for Discharge: Maximum functional potential achieved  Outcomes Achieved: Patient able to partially acheive established goals  Discharge Destination: Home with home program  Discharge Instructions/Additional Comments: Wear stockings during day, remove at night and apply lotion.  Bandage as needed if swelling increases.  Encouraged exercise and elevation.  Reviewed skin care and care of stockings.      Claudia Mckinney, PT  2/18/2019

## 2019-02-20 ENCOUNTER — APPOINTMENT (OUTPATIENT)
Dept: PHYSICAL THERAPY | Facility: HOSPITAL | Age: 79
End: 2019-02-20

## 2019-02-22 ENCOUNTER — APPOINTMENT (OUTPATIENT)
Dept: PHYSICAL THERAPY | Facility: HOSPITAL | Age: 79
End: 2019-02-22

## 2019-02-22 RX ORDER — CARVEDILOL 6.25 MG/1
TABLET ORAL
Qty: 180 TABLET | Refills: 3 | Status: SHIPPED | OUTPATIENT
Start: 2019-02-22 | End: 2020-02-20

## 2019-03-13 RX ORDER — LOSARTAN POTASSIUM 50 MG/1
TABLET ORAL
Qty: 60 TABLET | Refills: 5 | Status: SHIPPED | OUTPATIENT
Start: 2019-03-13 | End: 2019-05-30

## 2019-04-10 RX ORDER — ATORVASTATIN CALCIUM 40 MG/1
TABLET, FILM COATED ORAL
Qty: 90 TABLET | Refills: 2 | Status: SHIPPED | OUTPATIENT
Start: 2019-04-10 | End: 2020-01-06

## 2019-05-30 ENCOUNTER — OFFICE VISIT (OUTPATIENT)
Dept: CARDIOLOGY | Facility: CLINIC | Age: 79
End: 2019-05-30

## 2019-05-30 VITALS
WEIGHT: 183 LBS | HEART RATE: 70 BPM | SYSTOLIC BLOOD PRESSURE: 128 MMHG | DIASTOLIC BLOOD PRESSURE: 80 MMHG | HEIGHT: 72 IN | OXYGEN SATURATION: 99 % | BODY MASS INDEX: 24.79 KG/M2

## 2019-05-30 DIAGNOSIS — I25.10 CORONARY ARTERY DISEASE INVOLVING NATIVE CORONARY ARTERY OF NATIVE HEART WITHOUT ANGINA PECTORIS: Primary | ICD-10-CM

## 2019-05-30 DIAGNOSIS — I10 ESSENTIAL HYPERTENSION: ICD-10-CM

## 2019-05-30 DIAGNOSIS — Z95.2 S/P AVR (AORTIC VALVE REPLACEMENT): ICD-10-CM

## 2019-05-30 DIAGNOSIS — Z98.890 S/P MVR (MITRAL VALVE REPAIR): ICD-10-CM

## 2019-05-30 PROCEDURE — 93000 ELECTROCARDIOGRAM COMPLETE: CPT | Performed by: PHYSICIAN ASSISTANT

## 2019-05-30 PROCEDURE — 99214 OFFICE O/P EST MOD 30 MIN: CPT | Performed by: PHYSICIAN ASSISTANT

## 2019-05-30 RX ORDER — IRBESARTAN 300 MG/1
300 TABLET ORAL EVERY MORNING
Refills: 2 | COMMUNITY
Start: 2019-05-16 | End: 2020-04-07 | Stop reason: ALTCHOICE

## 2019-05-30 NOTE — PROGRESS NOTES
Date of Office Visit: 2019  Encounter Provider: WONG Butler  Place of Service: [facility] CARDIOLOGY  Patient Name: Vj Bright  :1940    Chief Complaint   Patient presents with   • Coronary Artery Disease     6 month follow up   :     HPI: Vj Bright is a 78 y.o. male, new to me, who presents today for follow-up.  Old records have been obtained and reviewed by me.  He is a patient of Dr. Varghese's with a past cardiac history significant for coronary artery disease and valvular heart disease.  He had a STEMI and underwent stent placement to his circumflex and then was found to have severe aortic stenosis.  In 2015 he underwent an aortic valve replacement, mitral valve repair, and resection of subaortic stenosis by Dr. Kim.  He had some postoperative bleeding and did have to go back for clot evacuation.  He was last in our office to see Dr. Varghese on 2019.  At that visit he was doing well without complaints of angina or heart failure.  He had some swelling in his lower extremities and was referred to the lymphedema clinic.  No changes were made to his medical regimen, and it was recommended that he follow-up in 6 months.  I am seeing him today sooner than that appointment with complaints of dizziness.  He did bring a log of his blood pressure with him.  His blood pressures have been running anywhere between 102 143 systolic.  There is one isolated episode of a low blood pressure of 87/58.   He states that every morning when he gets up he usually sits on the edge of the bed before he stands up to go to the bathroom.  One morning a few weeks ago he jumped right out of bed and went straight to the bathroom.  He then had a near syncopal episode with some lightheadedness.  He did not lose consciousness.  He sat down on the bed and felt better.  The same thing happened again a little bit later in the day when he was at work.  He has not had an  episode since.  He went to see his primary care doctor shortly thereafter and his clonidine was increased from 0.3 mg daily to 0.4 mg daily for 10 days.  Tomorrow he will go back down to his normal dose.  This is because at his primary care doctor's office, his blood pressure was elevated at 160/80.  With the exception of the one episode of lightheadedness he has been doing great.  He denies any chest pain, palpitations, edema, or syncope.  He has no orthopnea or PND.  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  He does this job about 30 hours a week.  He enjoys playing tennis with his grandson and can do so without difficulty.      Past Medical History:   Diagnosis Date   • Acute respiratory failure (CMS/[facility])    • Aortic stenosis    • BPH (benign prostatic hyperplasia)    • CAD (coronary artery disease)    • Chest pain    • CHF (congestive heart failure) (CMS/[facility])    • Diabetes mellitus (CMS/[facility])    • Gout    • Hyperlipidemia    • Hypertension    • PAF (paroxysmal atrial fibrillation) (CMS/[facility])    • Pulmonary hypertension (CMS/[facility])    • STEMI (ST elevation myocardial infarction) (CMS/[facility])        Past Surgical History:   Procedure Laterality Date   • AORTIC VALVE REPAIR/REPLACEMENT     • CARDIAC SURGERY     • CARDIAC SURGERY     • CORONARY ANGIOPLASTY WITH STENT PLACEMENT     • MITRAL VALVE REPAIR/REPLACEMENT     • OTHER SURGICAL HISTORY      AORTIC VALVE REPAIR OF SUBVALVULAR AORTIC STENOSIS   • OTHER SURGICAL HISTORY      CARDIAC CATH PROCEDURE OUTCOME: SUCCESSFUL   • OTHER SURGICAL HISTORY      VENOUS THROMBECTOMY BY COMBINED LEG INCISION       Social History     Socioeconomic History   • Marital status:      Spouse name: Not on file   • Number of children: Not on file   • Years of education: Not on file   • Highest education level: Not on file   Tobacco Use   • Smoking status: Never Smoker   • Smokeless tobacco: Never Used   Substance and Sexual  Activity   • Alcohol use: Yes     Alcohol/week: 0.6 oz     Types: 1 Cans of beer per week     Comment: social   • Drug use: No   • Sexual activity: Defer       Family History   Problem Relation Age of Onset   • Hypertension Mother    • Heart attack Father        Review of Systems   Constitution: Negative for chills, fever and malaise/fatigue.   Cardiovascular: Negative for chest pain, dyspnea on exertion, leg swelling, near-syncope, orthopnea, palpitations, paroxysmal nocturnal dyspnea and syncope.   Respiratory: Negative for cough and shortness of breath.    Musculoskeletal: Negative for joint pain, joint swelling and myalgias.   Gastrointestinal: Negative for abdominal pain, diarrhea, melena, nausea and vomiting.   Genitourinary: Negative for frequency and hematuria.   Neurological: Positive for light-headedness. Negative for numbness, paresthesias and seizures.   Allergic/Immunologic: Negative.    All other systems reviewed and are negative.      No Known Allergies      Current Outpatient Medications:   •  ACCU-CHEK BRANDON PLUS test strip, TEST DAILY AS DIRECTED, Disp: , Rfl: 1  •  ACCU-CHEK SOFTCLIX LANCETS lancets, TEST DAILY AS DIRECTED, Disp: , Rfl: 1  •  amoxicillin (AMOXIL) 500 MG capsule, Take 4 capsules by mouth See Admin Instructions. 4 capsules 1 hour prior to procedure, Disp: 4 capsule, Rfl: 3  •  aspirin  MG tablet, Take 325 mg by mouth Daily., Disp: , Rfl:   •  atorvastatin (LIPITOR) 40 MG tablet, TAKE ONE TABLET BY MOUTH EVERY NIGHT AT BEDTIME, Disp: 90 tablet, Rfl: 2  •  carvedilol (COREG) 6.25 MG tablet, TAKE ONE TABLET BY MOUTH TWICE A DAY WITH MEALS, Disp: 180 tablet, Rfl: 3  •  Cholecalciferol (VITAMIN D-3) 1000 UNITS capsule, Take 1 capsule by mouth daily., Disp: , Rfl:   •  CloNIDine (CATAPRES) 0.1 MG tablet, Take 0.3 mg by mouth Every 12 (Twelve) Hours. Taking 0.4 for one more day, Disp: , Rfl:   •  irbesartan (AVAPRO) 300 MG tablet, Take 300 mg by mouth Every Morning., Disp: , Rfl:  "2  •  SitaGLIPtin-MetFORMIN HCl -1000 MG tablet sustained-release 24 hour, Take 1 tablet by mouth daily., Disp: , Rfl:   •  tamsulosin (FLOMAX) 0.4 MG capsule 24 hr capsule, Take 2 capsules by mouth daily., Disp: , Rfl:   •  torsemide (DEMADEX) 10 MG tablet, 20 mg Every Morning., Disp: , Rfl:   •  ULORIC 40 MG tablet, Take 1 tablet by mouth Daily., Disp: , Rfl: 3      Objective:     Vitals:    05/30/19 1428 05/30/19 1435   BP: 122/76 128/80   BP Location: Right arm Left arm   Pulse: 70    SpO2: 99%    Weight: 83 kg (183 lb)    Height: 182.9 cm (72\")      Body mass index is 24.82 kg/m².    PHYSICAL EXAM:    Physical Exam   Constitutional: He is oriented to person, place, and time. He appears well-developed and well-nourished. No distress.   HENT:   Head: Normocephalic and atraumatic.   Eyes: Pupils are equal, round, and reactive to light.   Neck: No JVD present. No thyromegaly present.   Cardiovascular: Normal rate, regular rhythm, normal heart sounds and intact distal pulses.   No murmur heard.  Pulmonary/Chest: Effort normal and breath sounds normal. No respiratory distress.   Abdominal: Soft. Bowel sounds are normal. He exhibits no distension. There is no splenomegaly or hepatomegaly. There is no tenderness.   Musculoskeletal: Normal range of motion. He exhibits no edema.   Neurological: He is alert and oriented to person, place, and time.   Skin: Skin is warm and dry. He is not diaphoretic. No erythema.   Psychiatric: He has a normal mood and affect. His behavior is normal. Judgment normal.         ECG 12 Lead  Date/Time: 5/30/2019 2:49 PM  Performed by: Viki James PA  Authorized by: Viki James PA   Comparison: compared with previous ECG from 1/18/2019  Similar to previous ECG  Rhythm: sinus rhythm  BPM: 67    Clinical impression: abnormal EKG  Comments: Indication: Coronary disease              Assessment:       Diagnosis Plan   1. Coronary artery disease involving native coronary artery of " native heart without angina pectoris  ECG 12 Lead   2. Essential hypertension     3. S/P AVR (aortic valve replacement)     4. S/P MVR (mitral valve repair)       Orders Placed This Encounter   Procedures   • ECG 12 Lead     This order was created via procedure documentation          Plan:       1.  Coronary disease.  He has no complaints of angina.  Continue current medical regimen.    2.  Hypertension.  This is managed by his PCP.  His clonidine was recently increased for hypertension and tomorrow he is going back down to his normal dose.  His blood pressure readings at home have been within normal limits and today's blood pressure is 128/80.  Continue current medical regimen.    3.  Status post aortic valve replacement, mitral valve repair, and resection of subaortic stenosis.  His last echocardiogram was in September 2018 and looked great.  Continue current medical regimen.    4.  Lightheadedness.  I think this was probably some orthostatic hypotension from getting up too quickly in the morning.  He has not had an episode since.  I have advised him to make sure that he sits up on the bed for about a minute before he gets up and goes to the bathroom.    Overall he is doing really well and I am not going to make any changes.  He will come for his regular appointment in August.    As always, it has been a pleasure to participate in your patient's care.      Sincerely,         Viki James PA-C

## 2019-08-14 ENCOUNTER — OFFICE VISIT (OUTPATIENT)
Dept: CARDIOLOGY | Facility: CLINIC | Age: 79
End: 2019-08-14

## 2019-08-14 VITALS
RESPIRATION RATE: 18 BRPM | OXYGEN SATURATION: 98 % | DIASTOLIC BLOOD PRESSURE: 66 MMHG | WEIGHT: 186 LBS | BODY MASS INDEX: 25.19 KG/M2 | HEIGHT: 72 IN | SYSTOLIC BLOOD PRESSURE: 124 MMHG | HEART RATE: 74 BPM

## 2019-08-14 DIAGNOSIS — Z98.890 S/P MVR (MITRAL VALVE REPAIR): ICD-10-CM

## 2019-08-14 DIAGNOSIS — I25.10 CORONARY ARTERY DISEASE INVOLVING NATIVE CORONARY ARTERY OF NATIVE HEART WITHOUT ANGINA PECTORIS: Primary | ICD-10-CM

## 2019-08-14 DIAGNOSIS — I10 ESSENTIAL HYPERTENSION: ICD-10-CM

## 2019-08-14 DIAGNOSIS — Z95.2 S/P AVR (AORTIC VALVE REPLACEMENT): ICD-10-CM

## 2019-08-14 PROBLEM — I48.0 PAROXYSMAL ATRIAL FIBRILLATION: Status: ACTIVE | Noted: 2019-08-14

## 2019-08-14 PROBLEM — I21.3 ST ELEVATION MYOCARDIAL INFARCTION (STEMI) (HCC): Status: ACTIVE | Noted: 2019-08-14

## 2019-08-14 PROBLEM — I77.9 DISORDER OF AORTA (HCC): Status: ACTIVE | Noted: 2019-08-14

## 2019-08-14 PROCEDURE — 93000 ELECTROCARDIOGRAM COMPLETE: CPT | Performed by: NURSE PRACTITIONER

## 2019-08-14 PROCEDURE — 99214 OFFICE O/P EST MOD 30 MIN: CPT | Performed by: NURSE PRACTITIONER

## 2019-08-14 RX ORDER — GABAPENTIN 100 MG/1
100 CAPSULE ORAL 3 TIMES DAILY
COMMUNITY
Start: 2019-08-09 | End: 2022-06-23 | Stop reason: ALTCHOICE

## 2019-08-14 NOTE — PROGRESS NOTES
Date of Office Visit: 2019  Encounter Provider: CLAIR Calderon  Place of Service: [facility] CARDIOLOGY  Patient Name: Vj Bright  :1940    Chief Complaint   Patient presents with   • Atrial Fibrillation   • Coronary Artery Disease   :     HPI: Vj Bright is a 78 y.o. male, new to me, who presents today for follow-up.  Old records have been obtained and reviewed by me.  He is a patient of Dr. Varghese's with a past cardiac history significant for coronary artery disease and valvular heart disease.  He also has a history of paroxysmal atrial fibrillation with acute on chronic diastolic heart failure.  In 2015, he had a STEMI and underwent stent placement to his circumflex.  At that time, he was then found to have severe aortic stenosis.  In 2015, he underwent aortic valve replacement, mitral valve repair, and resection of subaortic stenosis by Dr. Kim.  He did have some postoperative bleeding and had to go back for clot evacuation.  His last echocardiogram was in  which revealed normal LV systolic function with an EF of 61%, grade 1 diastolic dysfunction, a well-functioning prosthetic, and mitral valve ring and mild mitral valve regurgitation, and mild tricuspid valve regurgitation.  He was last seen in the office on 2019 at which time he was reporting that every morning when getting up he had a stent on the edge of the bed before going to the bathroom due to lightheadedness.  He did experience one episode of lightheadedness and near syncope one morning.  This resolved after sitting down on the bed.  With the exception of that episode, he had been doing well with no complaints of angina or heart failure.  No changes were made to his medical regimen and he was advised to follow-up as regularly scheduled in August.   Since he was last in the office, he has been doing well from a cardiac standpoint.  He denies any chest pain, shortness  of air, palpitations, dizziness, or syncope.  He does have occasional edema in his ankles.  He also struggles with neuropathy in both of his feet.  He does not get much exercise due to the neuropathy.  And he does try to be mindful of his diet.  Evidently he was at his PCP yesterday for routine follow-up.  At that time, his PCP decrease the dose of his clonidine because his blood pressure has been so well controlled.    Past Medical History:   Diagnosis Date   • Acute respiratory failure (CMS/[facility])    • Aortic stenosis    • BPH (benign prostatic hyperplasia)    • CAD (coronary artery disease)    • Chest pain    • CHF (congestive heart failure) (CMS/[facility])    • Diabetes mellitus (CMS/[facility])    • Gout    • Hyperlipidemia    • Hypertension    • PAF (paroxysmal atrial fibrillation) (CMS/[facility])    • Pulmonary hypertension (CMS/[facility])    • STEMI (ST elevation myocardial infarction) (CMS/[facility])        Past Surgical History:   Procedure Laterality Date   • AORTIC VALVE REPAIR/REPLACEMENT     • CARDIAC SURGERY     • CARDIAC SURGERY     • CORONARY ANGIOPLASTY WITH STENT PLACEMENT     • MITRAL VALVE REPAIR/REPLACEMENT     • OTHER SURGICAL HISTORY      AORTIC VALVE REPAIR OF SUBVALVULAR AORTIC STENOSIS   • OTHER SURGICAL HISTORY      CARDIAC CATH PROCEDURE OUTCOME: SUCCESSFUL   • OTHER SURGICAL HISTORY      VENOUS THROMBECTOMY BY COMBINED LEG INCISION       Social History     Socioeconomic History   • Marital status:      Spouse name: Not on file   • Number of children: Not on file   • Years of education: Not on file   • Highest education level: Not on file   Tobacco Use   • Smoking status: Never Smoker   • Smokeless tobacco: Never Used   Substance and Sexual Activity   • Alcohol use: Yes     Alcohol/week: 0.6 oz     Types: 1 Cans of beer per week     Comment: social   • Drug use: No   • Sexual activity: Defer       Family History   Problem Relation Age of Onset   • Hypertension Mother    • Heart attack Father        Review of  Systems   Constitution: Negative for chills, fever and malaise/fatigue.   Cardiovascular: Positive for leg swelling. Negative for chest pain, dyspnea on exertion, near-syncope, orthopnea, palpitations, paroxysmal nocturnal dyspnea and syncope.   Respiratory: Negative for cough and shortness of breath.    Musculoskeletal: Negative for joint pain, joint swelling and myalgias.   Gastrointestinal: Negative for abdominal pain, diarrhea, melena, nausea and vomiting.   Genitourinary: Negative for frequency and hematuria.   Neurological: Positive for numbness and paresthesias. Negative for light-headedness and seizures.   Allergic/Immunologic: Negative.    All other systems reviewed and are negative.      No Known Allergies      Current Outpatient Medications:   •  ACCU-CHEK BRANDON PLUS test strip, TEST DAILY AS DIRECTED, Disp: , Rfl: 1  •  ACCU-CHEK SOFTCLIX LANCETS lancets, TEST DAILY AS DIRECTED, Disp: , Rfl: 1  •  atorvastatin (LIPITOR) 40 MG tablet, TAKE ONE TABLET BY MOUTH EVERY NIGHT AT BEDTIME, Disp: 90 tablet, Rfl: 2  •  carvedilol (COREG) 6.25 MG tablet, TAKE ONE TABLET BY MOUTH TWICE A DAY WITH MEALS, Disp: 180 tablet, Rfl: 3  •  Cholecalciferol (VITAMIN D-3) 1000 UNITS capsule, Take 1 capsule by mouth daily., Disp: , Rfl:   •  CloNIDine (CATAPRES) 0.1 MG tablet, Take 0.3 mg by mouth Every Night. Taking 0.3 mg nightly, Disp: , Rfl:   •  gabapentin (NEURONTIN) 100 MG capsule, Take 100 mg by mouth 3 (Three) Times a Day., Disp: , Rfl:   •  irbesartan (AVAPRO) 300 MG tablet, Take 300 mg by mouth Every Morning., Disp: , Rfl: 2  •  SitaGLIPtin-MetFORMIN HCl -1000 MG tablet sustained-release 24 hour, Take 1 tablet by mouth daily., Disp: , Rfl:   •  tamsulosin (FLOMAX) 0.4 MG capsule 24 hr capsule, Take 2 capsules by mouth daily., Disp: , Rfl:   •  torsemide (DEMADEX) 10 MG tablet, 20 mg Every Morning., Disp: , Rfl:   •  ULORIC 40 MG tablet, Take 1 tablet by mouth Daily., Disp: , Rfl: 3  •  aspirin 81 MG tablet,  "Take 1 tablet by mouth Daily., Disp: 30 tablet, Rfl: 11      Objective:     Vitals:    08/14/19 1504 08/14/19 1508   BP: 118/64 124/66   BP Location: Right arm Left arm   Patient Position: Sitting Sitting   Pulse: 74    Resp: 18    SpO2: 98%    Weight: 84.4 kg (186 lb)    Height: 182.9 cm (72\")      Body mass index is 25.23 kg/m².    PHYSICAL EXAM:    Physical Exam   Constitutional: He is oriented to person, place, and time. He appears well-developed and well-nourished. No distress.   HENT:   Head: Normocephalic and atraumatic.   Eyes: Pupils are equal, round, and reactive to light.   Neck: No JVD present. No thyromegaly present.   Cardiovascular: Normal rate, regular rhythm, normal heart sounds and intact distal pulses.   No murmur heard.  Pulmonary/Chest: Effort normal and breath sounds normal. No respiratory distress.   Abdominal: Soft. Bowel sounds are normal. He exhibits no distension. There is no splenomegaly or hepatomegaly. There is no tenderness.   Musculoskeletal: Normal range of motion. He exhibits no edema.   Neurological: He is alert and oriented to person, place, and time.   Skin: Skin is warm and dry. He is not diaphoretic. No erythema.   Psychiatric: He has a normal mood and affect. His behavior is normal. Judgment normal.         ECG 12 Lead  Date/Time: 8/14/2019 3:30 PM  Performed by: Vika Chaudhry APRN  Authorized by: Vika Chaudhry APRN   Comparison: compared with previous ECG from 5/30/2019  Similar to previous ECG  Rhythm: sinus rhythm  Rate: normal  BPM: 72  T flattening: I and aVL  Other findings: non-specific ST-T wave changes    Clinical impression: abnormal EKG  Comments: Indication: CAD  T wave changes unchanged from prior.  EKG reviewed with Dr. Varghese              Assessment:       Diagnosis Plan   1. Coronary artery disease involving native coronary artery of native heart without angina pectoris  ECG 12 Lead   2. S/P AVR (aortic valve replacement)     3. S/P MVR (mitral " valve repair)     4. Essential hypertension       Orders Placed This Encounter   Procedures   • ECG 12 Lead     This order was created via procedure documentation          Plan:       1. Coronary Artery Disease  Assessment  • The patient has no angina    Plan  • Lifestyle modifications discussed include medication compliance    Subjective - Objective  • There is a history of past MI  • There has been a previous stent procedure using MARIE  • Current antiplatelet therapy includes aspirin 81 mg  • He is on good medical therapy.  He is currently taking a 325 mg aspirin.  I am going to have him change that to 81 mg aspirin.      2.  Status post aortic valve replacement and mitral valve repair.  Echocardiogram from September 2018 revealed a well-functioning aortic valve and a mitral ring.  He is asymptomatic.      3.  Hypertension.  His blood pressure looks great today.  Continue current regimen.      Overall I think he is doing well from a cardiac standpoint.  He denies any symptoms of angina or heart failure.  I did discuss the plan of care with Dr. Varghese and he is in agreement.  I am not going to make any changes to his medical regimen and he will follow-up with Dr. Varghese in 6 months or sooner if needed.      As always, it has been a pleasure to participate in your patient's care.      Sincerely,         CLAIR Wolf

## 2019-11-19 RX ORDER — AMOXICILLIN 500 MG/1
CAPSULE ORAL
Qty: 4 CAPSULE | Refills: 0 | Status: SHIPPED | OUTPATIENT
Start: 2019-11-19 | End: 2020-12-02

## 2020-01-06 RX ORDER — ATORVASTATIN CALCIUM 40 MG/1
TABLET, FILM COATED ORAL
Qty: 90 TABLET | Refills: 1 | Status: SHIPPED | OUTPATIENT
Start: 2020-01-06 | End: 2020-07-02

## 2020-01-13 ENCOUNTER — OFFICE (AMBULATORY)
Dept: URBAN - METROPOLITAN AREA PATHOLOGY 4 | Facility: PATHOLOGY | Age: 80
End: 2020-01-13
Payer: COMMERCIAL

## 2020-01-13 ENCOUNTER — AMBULATORY SURGICAL CENTER (AMBULATORY)
Dept: URBAN - METROPOLITAN AREA SURGERY 17 | Facility: SURGERY | Age: 80
End: 2020-01-13
Payer: COMMERCIAL

## 2020-01-13 VITALS
HEART RATE: 54 BPM | SYSTOLIC BLOOD PRESSURE: 155 MMHG | SYSTOLIC BLOOD PRESSURE: 111 MMHG | DIASTOLIC BLOOD PRESSURE: 59 MMHG | SYSTOLIC BLOOD PRESSURE: 133 MMHG | DIASTOLIC BLOOD PRESSURE: 80 MMHG | WEIGHT: 180 LBS | SYSTOLIC BLOOD PRESSURE: 121 MMHG | DIASTOLIC BLOOD PRESSURE: 83 MMHG | HEART RATE: 69 BPM | HEART RATE: 49 BPM | SYSTOLIC BLOOD PRESSURE: 147 MMHG | DIASTOLIC BLOOD PRESSURE: 67 MMHG | HEART RATE: 58 BPM | SYSTOLIC BLOOD PRESSURE: 127 MMHG | DIASTOLIC BLOOD PRESSURE: 52 MMHG | OXYGEN SATURATION: 98 % | HEART RATE: 51 BPM | HEART RATE: 67 BPM | SYSTOLIC BLOOD PRESSURE: 186 MMHG | RESPIRATION RATE: 15 BRPM | RESPIRATION RATE: 10 BRPM | TEMPERATURE: 98.1 F | SYSTOLIC BLOOD PRESSURE: 149 MMHG | TEMPERATURE: 97.8 F | SYSTOLIC BLOOD PRESSURE: 164 MMHG | RESPIRATION RATE: 9 BRPM | OXYGEN SATURATION: 99 % | HEART RATE: 55 BPM | DIASTOLIC BLOOD PRESSURE: 81 MMHG | OXYGEN SATURATION: 97 % | RESPIRATION RATE: 13 BRPM | DIASTOLIC BLOOD PRESSURE: 61 MMHG | SYSTOLIC BLOOD PRESSURE: 101 MMHG | SYSTOLIC BLOOD PRESSURE: 142 MMHG | DIASTOLIC BLOOD PRESSURE: 56 MMHG | DIASTOLIC BLOOD PRESSURE: 92 MMHG | HEART RATE: 57 BPM | HEART RATE: 60 BPM | SYSTOLIC BLOOD PRESSURE: 162 MMHG | DIASTOLIC BLOOD PRESSURE: 84 MMHG | HEART RATE: 59 BPM | RESPIRATION RATE: 14 BRPM | HEART RATE: 62 BPM | OXYGEN SATURATION: 100 % | RESPIRATION RATE: 12 BRPM | HEART RATE: 56 BPM | SYSTOLIC BLOOD PRESSURE: 138 MMHG | HEIGHT: 72 IN | DIASTOLIC BLOOD PRESSURE: 51 MMHG | SYSTOLIC BLOOD PRESSURE: 191 MMHG

## 2020-01-13 DIAGNOSIS — D12.4 BENIGN NEOPLASM OF DESCENDING COLON: ICD-10-CM

## 2020-01-13 DIAGNOSIS — D12.2 BENIGN NEOPLASM OF ASCENDING COLON: ICD-10-CM

## 2020-01-13 DIAGNOSIS — K63.5 POLYP OF COLON: ICD-10-CM

## 2020-01-13 DIAGNOSIS — R19.4 CHANGE IN BOWEL HABIT: ICD-10-CM

## 2020-01-13 DIAGNOSIS — D12.5 BENIGN NEOPLASM OF SIGMOID COLON: ICD-10-CM

## 2020-01-13 DIAGNOSIS — D12.3 BENIGN NEOPLASM OF TRANSVERSE COLON: ICD-10-CM

## 2020-01-13 LAB
GI HISTOLOGY: A. SELECT: (no result)
GI HISTOLOGY: B. UNSPECIFIED: (no result)
GI HISTOLOGY: C. UNSPECIFIED: (no result)
GI HISTOLOGY: D. UNSPECIFIED: (no result)
GI HISTOLOGY: E. UNSPECIFIED: (no result)
GI HISTOLOGY: PDF REPORT: (no result)

## 2020-01-13 PROCEDURE — 45380 COLONOSCOPY AND BIOPSY: CPT | Mod: 59 | Performed by: INTERNAL MEDICINE

## 2020-01-13 PROCEDURE — 88305 TISSUE EXAM BY PATHOLOGIST: CPT | Performed by: INTERNAL MEDICINE

## 2020-01-13 PROCEDURE — 45385 COLONOSCOPY W/LESION REMOVAL: CPT | Performed by: INTERNAL MEDICINE

## 2020-01-13 NOTE — SERVICEHPINOTES
78 yo M with h/o HTN, HLD, DM, CAD, AV replacement, CKD, BPH. Last CN 2012. Had adenomatous polyps in 2009.  No fam hx colon ca. c/o change in BMs.  Consistency has not changed.  They are more narrow than prior.  He has also lost about 10lbs unintentionally.  He has noticed this due to his pants not fitting the same.Discussed r/b/a prior and he does wish to proceed.

## 2020-01-17 ENCOUNTER — TRANSCRIBE ORDERS (OUTPATIENT)
Dept: ADMINISTRATIVE | Facility: HOSPITAL | Age: 80
End: 2020-01-17

## 2020-01-17 DIAGNOSIS — R19.4 BOWEL HABIT CHANGES: Primary | ICD-10-CM

## 2020-01-20 ENCOUNTER — HOSPITAL ENCOUNTER (OUTPATIENT)
Dept: CT IMAGING | Facility: HOSPITAL | Age: 80
Discharge: HOME | End: 2020-01-20
Admitting: INTERNAL MEDICINE

## 2020-01-20 DIAGNOSIS — R19.4 BOWEL HABIT CHANGES: ICD-10-CM

## 2020-01-20 PROCEDURE — 74176 CT ABD & PELVIS W/O CONTRAST: CPT

## 2020-02-20 RX ORDER — CARVEDILOL 6.25 MG/1
TABLET ORAL
Qty: 180 TABLET | Refills: 2 | Status: SHIPPED | OUTPATIENT
Start: 2020-02-20 | End: 2020-12-15

## 2020-03-19 ENCOUNTER — TELEPHONE (OUTPATIENT)
Dept: CARDIOLOGY | Facility: CLINIC | Age: 80
End: 2020-03-19

## 2020-03-19 NOTE — TELEPHONE ENCOUNTER
S/W re: rescheduling appt due to Coronavirus Precautions. Pt stated he was not having any symptoms of Angina, SOA Dizziness, Lightheadedness, and no refills needed. I advised pt if any symptoms occur in the meantime or any refills are needed, then to give us a call. Pt verbalized appreciation and understanding.    MARIETTA Busby

## 2020-04-07 ENCOUNTER — OFFICE VISIT (OUTPATIENT)
Dept: CARDIOLOGY | Facility: CLINIC | Age: 80
End: 2020-04-07

## 2020-04-07 VITALS
WEIGHT: 180 LBS | HEART RATE: 70 BPM | TEMPERATURE: 97.6 F | BODY MASS INDEX: 24.38 KG/M2 | SYSTOLIC BLOOD PRESSURE: 122 MMHG | DIASTOLIC BLOOD PRESSURE: 72 MMHG | HEIGHT: 72 IN

## 2020-04-07 DIAGNOSIS — Z98.890 S/P MVR (MITRAL VALVE REPAIR): ICD-10-CM

## 2020-04-07 DIAGNOSIS — I10 ESSENTIAL HYPERTENSION: ICD-10-CM

## 2020-04-07 DIAGNOSIS — I25.10 CORONARY ARTERY DISEASE INVOLVING NATIVE CORONARY ARTERY OF NATIVE HEART WITHOUT ANGINA PECTORIS: Primary | ICD-10-CM

## 2020-04-07 DIAGNOSIS — Z95.2 S/P AVR (AORTIC VALVE REPLACEMENT): ICD-10-CM

## 2020-04-07 PROCEDURE — 99443 PR PHYS/QHP TELEPHONE EVALUATION 21-30 MIN: CPT | Performed by: INTERNAL MEDICINE

## 2020-04-07 RX ORDER — CLONIDINE HYDROCHLORIDE 0.3 MG/1
1 TABLET ORAL NIGHTLY
COMMUNITY
Start: 2020-03-12 | End: 2022-06-23 | Stop reason: ALTCHOICE

## 2020-04-07 RX ORDER — OLMESARTAN MEDOXOMIL 40 MG/1
1 TABLET ORAL DAILY
COMMUNITY
Start: 2020-03-12 | End: 2020-09-24 | Stop reason: ALTCHOICE

## 2020-04-07 RX ORDER — TORSEMIDE 20 MG/1
1 TABLET ORAL DAILY
COMMUNITY
Start: 2020-03-23 | End: 2020-10-27 | Stop reason: ALTCHOICE

## 2020-04-07 NOTE — PROGRESS NOTES
Vj Bright  1940  Date of Office Visit: 04/07/20  Encounter Provider: Liborio Varghese MD  Place of Service: [facility] CARDIOLOGY    TELEHEALTH VISIT  PHONE    Chief complaint  1. Followup aortic valve replacement with 25 mm bovine pericardial valve.   2. Mitral valve repair with flexible band.  3. Resection of subaortic stenosis.   4. STEMI.   5. Coronary artery disease.     History of Present Illness  This patient has consented to a telehealth visit via phone. The visit was scheduled as a phone visit to comply with patient safety concerns in accordance with CDC recommendations.  All vitals recorded within this visit are reported by the patient.  I spent 25 minutes in total including but not limited to the 10 minutes spent in direct conversation with this patient.   Dr. Real,  I had the pleasure of seeing your patient back in follow up today. He is a pleasant 79-year-old gentleman who presented initially with a ST-elevation MI in and had a stent placed to his left circumflex coronary artery in 3/2015. He was found to have severe aortic stenosis on that hospitalization. He also had paroxysmal atrial fibrillation and associated with acute on chronic diastolic heart failure.    He was referred to Dr. Kim secondary to a mean gradient across his aortic valve of 58 mmHg and a valve area of 0.4 cm2.  He was admitted and underwent aortic valve replacement with 23 mm Milagros Graf bovine pericardial valve, mitral valve repair with 25 mm [employer] flexible band and resection of subaortic stenosis. After CABG, he had paroxysmal atrial fibrillation and required IV amiodarone at increasing doses with return to sinus rhythm. He had some rebleeding and was taken back for evacuation of clot and addressing of a smaller arterial bleeder. He did have some very mild expressive aphasia around the time of the operation, but this has resolved. He has had no recurrence of atrial  fibrillation.  His amiodarone and Coumadin therapy have previously been stopped.     Since our last visit he has been doing very well. He denies any chest pain or dyspnea on exertion. He has not been overly active as of late with quarantine but still feels like he is doing fine. He denies any orthopnea or PND. He states that his lower extremity edema has improved on his current medical regimen.           Review of Systems   Constitution: Negative for fever and malaise/fatigue.   HENT: Negative for nosebleeds and sore throat.    Eyes: Negative for blurred vision and double vision.   Cardiovascular: Negative for chest pain, claudication, palpitations and syncope.   Respiratory: Negative for cough, shortness of breath and snoring.    Endocrine: Negative for cold intolerance, heat intolerance and polydipsia.   Skin: Negative for itching, poor wound healing and rash.   Musculoskeletal: Negative for joint pain, joint swelling, muscle weakness and myalgias.   Gastrointestinal: Negative for abdominal pain, melena, nausea and vomiting.   Neurological: Negative for light-headedness, loss of balance, seizures, vertigo and weakness.   Psychiatric/Behavioral: Negative for altered mental status and depression.       Past Medical History:   Diagnosis Date   • Acute respiratory failure (CMS/[facility])    • Aortic stenosis    • BPH (benign prostatic hyperplasia)    • CAD (coronary artery disease)    • Chest pain    • CHF (congestive heart failure) (CMS/[facility])    • Diabetes mellitus (CMS/[facility])    • Gout    • Hyperlipidemia    • Hypertension    • PAF (paroxysmal atrial fibrillation) (CMS/[facility])    • Pulmonary hypertension (CMS/[facility])    • STEMI (ST elevation myocardial infarction) (CMS/[facility])        The following portions of the patient's history were reviewed and updated as appropriate: Social history , Family history and Surgical history     Current Outpatient Medications on File Prior to Visit   Medication Sig Dispense Refill   • ACCU-CHEK BRANDON  "PLUS test strip TEST DAILY AS DIRECTED  1   • ACCU-CHEK SOFTCLIX LANCETS lancets TEST DAILY AS DIRECTED  1   • amoxicillin (AMOXIL) 500 MG capsule TAKE 4 CAPSULES BY MOUTH 1 HOUR PRIOR TO PROCEDURE 4 capsule 0   • aspirin 81 MG tablet Take 1 tablet by mouth Daily. 30 tablet 11   • atorvastatin (LIPITOR) 40 MG tablet TAKE ONE TABLET BY MOUTH EVERY NIGHT AT BEDTIME 90 tablet 1   • carvedilol (COREG) 6.25 MG tablet TAKE ONE TABLET BY MOUTH TWICE A DAY WITH MEALS 180 tablet 2   • Cholecalciferol (VITAMIN D-3) 1000 UNITS capsule Take 1 capsule by mouth daily.     • cloNIDine (CATAPRES) 0.3 MG tablet Take 1 tablet by mouth Every Night.     • gabapentin (NEURONTIN) 100 MG capsule Take 100 mg by mouth 3 (Three) Times a Day.     • olmesartan (BENICAR) 40 MG tablet Take 1 tablet by mouth Daily.     • SitaGLIPtin-MetFORMIN HCl -1000 MG tablet sustained-release 24 hour Take 1 tablet by mouth daily.     • tamsulosin (FLOMAX) 0.4 MG capsule 24 hr capsule Take 2 capsules by mouth daily.     • torsemide (DEMADEX) 20 MG tablet Take 1 tablet by mouth Daily.     • ULORIC 40 MG tablet Take 1 tablet by mouth Daily.  3   • [DISCONTINUED] CloNIDine (CATAPRES) 0.1 MG tablet Take 0.3 mg by mouth Every Night. Taking 0.3 mg nightly     • [DISCONTINUED] irbesartan (AVAPRO) 300 MG tablet Take 300 mg by mouth Every Morning.  2   • [DISCONTINUED] torsemide (DEMADEX) 10 MG tablet 20 mg Every Morning.       No current facility-administered medications on file prior to visit.        No Known Allergies    Vitals:    04/07/20 1130   BP: 122/72   Pulse: 70   Temp: 97.6 °F (36.4 °C)   Weight: 81.6 kg (180 lb)   Height: 182.9 cm (72\")     No exam  Telehealth evaluation secondary to COVID-19 outbreak    No results found for: CBC  No results found for: CMP  No components found for: LIPID  No results found for: BMP    Procedures  9/28/18  · Left ventricular systolic function is normal.  · The left ventricular cavity is mildly dilated.  · Left " ventricular diastolic dysfunction (grade I) consistent with impaired relaxation.  · Left atrial cavity size is mildly dilated.  · There is a prosthetic aortic valve.  · Aortic valve mean pressure gradient is 6.6 mmHg.  · Aortic valve maximum pressure gradient is 12.8 mmHg.  · There is a mitral valve ring present.  · Mild mitral valve regurgitation is present  · Mild tricuspid valve regurgitation is present.  · Calculated right ventricular systolic pressure from tricuspid regurgitation is 25 mmHg    DISCUSSION/SUMMARY  79 -year-old gentleman with a medical history as documented above including a presentation in 2015 with an inferior lateral STEMI and drug-eluting stent to the left circumflex coronary artery, severe aortic valve stenosis with a bioprosthetic aortic valve replacement as documented above, mitral valve repair, resection of subaortic stenosis, and paroxysmal atrial fibrillation perioperatively who presents back to me for follow-up.     Overall it sounds like he is doing very well and he has no new complaints. His blood pressure and heart rate have been well-controlled. He states that his sugars have been better controlled as well as of late.         1. Coronary disease, previous inferior lateral STEMI with percutaneous intervention to the left circumflex coronary artery disease: No angina.  Patient is doing very well.      -Continue his current regimen, which includes aspirin, carvedilol, losartan, and atorvastatin.    2. Aortic valve replacement, bioprosthetic.      -Continue aspirin.  Follow up echocardiogram after operation has been performed showing normal prosthetic valve functioning.    3.  Essential hypertension: This is better controlled.   4.  Bilateral lower extremity edema: Likely venous insufficiency.Improved   -Compression stockings are recommended.  Continue to follow with the lymphedema clinic.

## 2020-05-26 ENCOUNTER — TRANSCRIBE ORDERS (OUTPATIENT)
Dept: ADMINISTRATIVE | Facility: HOSPITAL | Age: 80
End: 2020-05-26

## 2020-05-26 DIAGNOSIS — R97.20 ELEVATED PSA: Primary | ICD-10-CM

## 2020-06-05 ENCOUNTER — HOSPITAL ENCOUNTER (OUTPATIENT)
Dept: MRI IMAGING | Facility: HOSPITAL | Age: 80
Discharge: HOME | End: 2020-06-05
Admitting: UROLOGY

## 2020-06-05 DIAGNOSIS — R97.20 ELEVATED PSA: ICD-10-CM

## 2020-06-05 PROCEDURE — 82565 ASSAY OF CREATININE: CPT

## 2020-06-05 PROCEDURE — 0 GADOBENATE DIMEGLUMINE 529 MG/ML SOLUTION: Performed by: UROLOGY

## 2020-06-05 PROCEDURE — A9577 INJ MULTIHANCE: HCPCS | Performed by: UROLOGY

## 2020-06-05 PROCEDURE — 72197 MRI PELVIS W/O & W/DYE: CPT

## 2020-06-05 RX ADMIN — GADOBENATE DIMEGLUMINE 16 ML: 529 INJECTION, SOLUTION INTRAVENOUS at 09:12

## 2020-06-08 LAB — CREAT BLDA-MCNC: 1.4 MG/DL (ref 0.6–1.3)

## 2020-06-09 ENCOUNTER — TRANSCRIBE ORDERS (OUTPATIENT)
Dept: ADMINISTRATIVE | Facility: HOSPITAL | Age: 80
End: 2020-06-09

## 2020-06-09 VITALS — HEIGHT: 72 IN

## 2020-06-09 DIAGNOSIS — R97.20 ELEVATED PSA: Primary | ICD-10-CM

## 2020-06-10 ENCOUNTER — OFFICE (AMBULATORY)
Dept: URBAN - METROPOLITAN AREA CLINIC 75 | Facility: CLINIC | Age: 80
End: 2020-06-10
Payer: COMMERCIAL

## 2020-06-10 DIAGNOSIS — D12.6 BENIGN NEOPLASM OF COLON, UNSPECIFIED: ICD-10-CM

## 2020-06-10 PROCEDURE — 99212 OFFICE O/P EST SF 10 MIN: CPT | Mod: 95 | Performed by: INTERNAL MEDICINE

## 2020-06-10 NOTE — SERVICEHPINOTES
80 yo M with h/o HTN, HLD, DM, CAD, bovine AV replacement, CKD, BPH, neurogenic bladder, colon polyps, CHF here for follow up. I initially saw him 1/2020 for colonoscopy due to change in BMs and weight loss. He had 8 polyps removed with one large polyp in DC that was removed piecemeal. 7 of the polyps came back adenomatous. Due to the weight loss, he was recommended to get CT scan, which showed marked distention of the bladder. He was referred to urology for this. He says his weight loss has resolved and he has been gaining weight. His BMs are back to normal. He will have a BM daily to every other day. No fam hx colon ca. Per chart reviewBR1/13/2020 colonoscopy- 8 polyps 1-12mm (DC polyp removed piecemeal), int/ext hemorrhoids BRPathology Report - DC- TA, sig- TA, AC- TA, TV- TA, DC- mucosal polyp BR1/20/2020 - CT ABD PELVIS - marked distention of bladder, small layering calculus. urology consult recommended. normal liver, spleen, pancreas.    BR2/3/2020 - [facility] - seen for neurogenic bladder, CKD

## 2020-06-10 NOTE — SERVICENOTES
Unable to do telemedicine visit due to patient not having phone or computer. Therefore, visit done with audio only. Virtual visit done instead given coronavirus pandemic.

## 2020-06-16 ENCOUNTER — HOSPITAL ENCOUNTER (OUTPATIENT)
Dept: MRI IMAGING | Facility: HOSPITAL | Age: 80
Discharge: HOME | End: 2020-06-16
Admitting: UROLOGY

## 2020-06-16 DIAGNOSIS — R97.20 ELEVATED PSA: ICD-10-CM

## 2020-06-16 LAB — CREAT BLDA-MCNC: 1.5 MG/DL (ref 0.6–1.3)

## 2020-06-16 PROCEDURE — 0 GADOBENATE DIMEGLUMINE 529 MG/ML SOLUTION: Performed by: UROLOGY

## 2020-06-16 PROCEDURE — 82565 ASSAY OF CREATININE: CPT

## 2020-06-16 PROCEDURE — A9577 INJ MULTIHANCE: HCPCS | Performed by: UROLOGY

## 2020-06-16 RX ADMIN — GADOBENATE DIMEGLUMINE 17 ML: 529 INJECTION, SOLUTION INTRAVENOUS at 18:45

## 2020-07-02 ENCOUNTER — TELEPHONE (OUTPATIENT)
Dept: CARDIOLOGY | Facility: CLINIC | Age: 80
End: 2020-07-02

## 2020-07-02 RX ORDER — ATORVASTATIN CALCIUM 40 MG/1
TABLET, FILM COATED ORAL
Qty: 90 TABLET | Refills: 0 | Status: SHIPPED | OUTPATIENT
Start: 2020-07-02 | End: 2020-07-02 | Stop reason: SDUPTHER

## 2020-07-02 RX ORDER — ATORVASTATIN CALCIUM 40 MG/1
40 TABLET, FILM COATED ORAL
Qty: 90 TABLET | Refills: 3 | Status: SHIPPED | OUTPATIENT
Start: 2020-07-02 | End: 2021-08-30

## 2020-07-02 NOTE — TELEPHONE ENCOUNTER
Pt hasn't had labs and wants to refill his atorvastatin, would you like him to come in for labs?    Marina IBARRA, CMA

## 2020-07-13 ENCOUNTER — TELEPHONE (OUTPATIENT)
Dept: CARDIOLOGY | Facility: CLINIC | Age: 80
End: 2020-07-13

## 2020-07-13 NOTE — TELEPHONE ENCOUNTER
Gracie with Dr. moore's office calling wanting clearance for Vj.  He is needing a prostate biopsy on July 27th  Would like for him to be offf his aspirin 1 week prior    [phone]   [phone]

## 2020-09-24 ENCOUNTER — TELEPHONE (OUTPATIENT)
Dept: CARDIOLOGY | Facility: CLINIC | Age: 80
End: 2020-09-24

## 2020-09-24 RX ORDER — AMLODIPINE BESYLATE 5 MG/1
5 TABLET ORAL DAILY
Qty: 30 TABLET | Refills: 11 | Status: SHIPPED | OUTPATIENT
Start: 2020-09-24 | End: 2021-12-02

## 2020-09-24 NOTE — TELEPHONE ENCOUNTER
I called him and advised of the medication change. He will call us in 1 week with BP/HRs.    Lyric

## 2020-09-24 NOTE — TELEPHONE ENCOUNTER
When you get a chance would you mind addressing this since Dr. Varghese is out til Tuesday next week.    The pt dropped us by a message. He had went to the Urgent [facility] at [place] on 9/23/20 for a swollen lip. They determined he had Angioedema from the Olmesartan and told him to stop this. They wanted him to contact us to see what he needs to be taking instead.  He is also taking:  Carvedilol 6.25mg bid  Clonidine 0.3mg 1/2 tab bid    His recent vitals on the Omesartan were /65 HR 62    The Urgent Care note is under Care Everywhere.  Please advise.    Thanks,  Lyric

## 2020-10-12 ENCOUNTER — TRANSCRIBE ORDERS (OUTPATIENT)
Dept: ADMINISTRATIVE | Facility: HOSPITAL | Age: 80
End: 2020-10-12

## 2020-10-12 DIAGNOSIS — R09.89 DECREASED PEDAL PULSES: Primary | ICD-10-CM

## 2020-10-14 ENCOUNTER — HOSPITAL ENCOUNTER (OUTPATIENT)
Dept: CARDIOLOGY | Facility: HOSPITAL | Age: 80
Discharge: HOME | End: 2020-10-14
Admitting: INTERNAL MEDICINE

## 2020-10-14 DIAGNOSIS — R09.89 DECREASED PEDAL PULSES: ICD-10-CM

## 2020-10-14 LAB
BH CV LOWER ARTERIAL LEFT ABI RATIO: 1.38
BH CV LOWER ARTERIAL LEFT DORSALIS PEDIS SYS MAX: 169 MMHG
BH CV LOWER ARTERIAL LEFT GREAT TOE SYS MAX: 120 MMHG
BH CV LOWER ARTERIAL LEFT POST TIBIAL SYS MAX: 183 MMHG
BH CV LOWER ARTERIAL LEFT TBI RATIO: 0.9
BH CV LOWER ARTERIAL RIGHT ABI RATIO: 1.32
BH CV LOWER ARTERIAL RIGHT DORSALIS PEDIS SYS MAX: 172 MMHG
BH CV LOWER ARTERIAL RIGHT GREAT TOE SYS MAX: 126 MMHG
BH CV LOWER ARTERIAL RIGHT POST TIBIAL SYS MAX: 175 MMHG
BH CV LOWER ARTERIAL RIGHT TBI RATIO: 0.95
UPPER ARTERIAL LEFT ARM BRACHIAL SYS MAX: 133 MMHG
UPPER ARTERIAL RIGHT ARM BRACHIAL SYS MAX: 126 MMHG

## 2020-10-14 PROCEDURE — 93922 UPR/L XTREMITY ART 2 LEVELS: CPT

## 2020-10-26 NOTE — PROGRESS NOTES
Date of Office Visit: 10/27/2020  Encounter Provider: CLAIR Calderon  Place of Service: [facility]  Patient Name: Vj Bright  :1940    Chief Complaint   Patient presents with   • Atrial Fibrillation     6 MTH FOLLOW UP   • Coronary Artery Disease   :     HPI: Vj Bright is a 80 y.o. male, known to me, who presents today for follow-up.  Old records have been obtained and reviewed by me.  He is a patient of Dr. Varghese's with a past cardiac history significant for coronary artery disease, paroxysmal atrial fibrillation, chronic diastolic heart failure, and aortic stenosis.  In 2015, he had a STEMI and underwent stent placement to his circumflex.  In 2015, he underwent an aortic valve replacement, mitral valve repair, and resection of subaortic stenosis.  Postoperatively, he developed some paroxysmal atrial fibrillation and was placed on amiodarone and warfarin.  He has had no recurrence of atrial fibrillation and amiodarone and warfarin have subsequently been discontinued.  His last echocardiogram was in 2018 which revealed normal LV function, a well-functioning prosthetic aortic valve with peak and mean gradients within defined limits, a well-functioning mitral valve ring with mild mitral regurgitation, and mild tricuspid regurgitation.   In April of this year, he had a telehealth visit with Dr. Varghese at which time he was doing well with no complaints of angina or heart failure.  He has chronic bilateral lower extremity lymphedema for which he follows in the lymphedema clinic.  No changes were made to his medical regimen, and he was advised to follow-up in 6 months.   He has been doing well from a cardiac standpoint.  He denies any chest pain, shortness of breath, palpitations, dizziness, or syncope.  He has been playing tennis 5 or 6 times a week.  He had been laid off at work for about 6 months but just restarted last week.   Evidently in January he is starting a second trial.  Of note, he recently developed what was diagnosed as angioedema and was taken off of olmesartan and placed on amlodipine instead.  He has had no recurrence of angioedema since.       Past Medical History:   Diagnosis Date   • Acute respiratory failure (CMS/[facility])    • Aortic stenosis    • BPH (benign prostatic hyperplasia)    • CAD (coronary artery disease)    • Chest pain    • CHF (congestive heart failure) (CMS/[facility])    • Diabetes mellitus (CMS/[facility])    • Gout    • Hyperlipidemia    • Hypertension    • PAF (paroxysmal atrial fibrillation) (CMS/[facility])    • Pulmonary hypertension (CMS/[facility])    • STEMI (ST elevation myocardial infarction) (CMS/[facility])        Past Surgical History:   Procedure Laterality Date   • AORTIC VALVE REPAIR/REPLACEMENT     • CARDIAC SURGERY     • CARDIAC SURGERY     • CORONARY ANGIOPLASTY WITH STENT PLACEMENT     • MITRAL VALVE REPAIR/REPLACEMENT     • OTHER SURGICAL HISTORY      AORTIC VALVE REPAIR OF SUBVALVULAR AORTIC STENOSIS   • OTHER SURGICAL HISTORY      CARDIAC CATH PROCEDURE OUTCOME: SUCCESSFUL   • OTHER SURGICAL HISTORY      VENOUS THROMBECTOMY BY COMBINED LEG INCISION       Social History     Socioeconomic History   • Marital status:      Spouse name: Not on file   • Number of children: Not on file   • Years of education: Not on file   • Highest education level: Not on file   Tobacco Use   • Smoking status: Never Smoker   • Smokeless tobacco: Never Used   • Tobacco comment: CAFFEINE USE: OCC. CHOCOLATE CANDY   Substance and Sexual Activity   • Alcohol use: Yes     Alcohol/week: 1.0 standard drinks     Types: 1 Cans of beer per week     Comment: social   • Drug use: No   • Sexual activity: Defer       Family History   Problem Relation Age of Onset   • Hypertension Mother    • Heart attack Father        Review of Systems   Constitution: Negative for chills, fever and malaise/fatigue.   Cardiovascular: Negative for chest pain, dyspnea  on exertion, leg swelling, near-syncope, orthopnea, palpitations, paroxysmal nocturnal dyspnea and syncope.   Respiratory: Negative for cough and shortness of breath.    Musculoskeletal: Negative for joint pain, joint swelling and myalgias.   Gastrointestinal: Negative for abdominal pain, diarrhea, melena, nausea and vomiting.   Genitourinary: Negative for frequency and hematuria.   Neurological: Negative for light-headedness, numbness, paresthesias and seizures.   Allergic/Immunologic: Negative.    All other systems reviewed and are negative.      Allergies   Allergen Reactions   • Angiotensin Receptor Blockers Angioedema     Lip swelling         Current Outpatient Medications:   •  ACCU-CHEK BRANDON PLUS test strip, TEST DAILY AS DIRECTED, Disp: , Rfl: 1  •  ACCU-CHEK SOFTCLIX LANCETS lancets, TEST DAILY AS DIRECTED, Disp: , Rfl: 1  •  amLODIPine (NORVASC) 5 MG tablet, Take 1 tablet by mouth Daily., Disp: 30 tablet, Rfl: 11  •  aspirin 81 MG tablet, Take 1 tablet by mouth Daily., Disp: 30 tablet, Rfl: 11  •  atorvastatin (LIPITOR) 40 MG tablet, Take 1 tablet by mouth every night at bedtime., Disp: 90 tablet, Rfl: 3  •  carvedilol (COREG) 6.25 MG tablet, TAKE ONE TABLET BY MOUTH TWICE A DAY WITH MEALS, Disp: 180 tablet, Rfl: 2  •  Cholecalciferol (VITAMIN D-3) 1000 UNITS capsule, Take 1 capsule by mouth daily., Disp: , Rfl:   •  cloNIDine (CATAPRES) 0.3 MG tablet, Take 1 tablet by mouth Every Night., Disp: , Rfl:   •  furosemide (LASIX) 20 MG tablet, Take 20 mg by mouth Daily., Disp: , Rfl:   •  gabapentin (NEURONTIN) 100 MG capsule, Take 100 mg by mouth 3 (Three) Times a Day., Disp: , Rfl:   •  glimepiride (AMARYL) 2 MG tablet, Take 2 mg by mouth Every Morning Before Breakfast., Disp: , Rfl:   •  SITagliptin (JANUVIA) 100 MG tablet, Take 100 mg by mouth Daily., Disp: , Rfl:   •  tamsulosin (FLOMAX) 0.4 MG capsule 24 hr capsule, Take 2 capsules by mouth daily., Disp: , Rfl:   •  ULORIC 40 MG tablet, Take 1 tablet by  "mouth Daily., Disp: , Rfl: 3  •  amoxicillin (AMOXIL) 500 MG capsule, TAKE 4 CAPSULES BY MOUTH 1 HOUR PRIOR TO PROCEDURE, Disp: 4 capsule, Rfl: 0      Objective:     Vitals:    10/27/20 1055 10/27/20 1106   BP: 112/66 112/66   BP Location: Right arm Left arm   Patient Position: Sitting Sitting   Pulse: 68    Resp: 18    SpO2: 98%    Weight: 87.1 kg (192 lb)    Height: 182.9 cm (72\")      Body mass index is 26.04 kg/m².    PHYSICAL EXAM:    Constitutional:       General: Not in acute distress.     Appearance: Well-developed. Not diaphoretic.   Eyes:      Pupils: Pupils are equal, round, and reactive to light.   HENT:      Head: Normocephalic and atraumatic.   Neck:      Thyroid: No thyromegaly.      Vascular: No JVD.   Pulmonary:      Effort: Pulmonary effort is normal. No respiratory distress.      Breath sounds: Normal breath sounds.   Cardiovascular:      Normal rate. Regular rhythm.   Pulses:     Intact distal pulses.   Abdominal:      General: Bowel sounds are normal. There is no distension.      Palpations: Abdomen is soft. There is no hepatomegaly or splenomegaly.      Tenderness: There is no abdominal tenderness.   Musculoskeletal: Normal range of motion.   Skin:     General: Skin is warm and dry.      Findings: No erythema.   Neurological:      Mental Status: Alert and oriented to person, place, and time.   Psychiatric:         Behavior: Behavior normal.         Judgment: Judgment normal.           ECG 12 Lead    Date/Time: 10/27/2020 11:10 AM  Performed by: Vika Chaudhry APRN  Authorized by: Vika Chaudhry APRN   Comparison: compared with previous ECG from 8/14/2019  Similar to previous ECG  Rhythm: sinus rhythm  Rate: normal  BPM: 63  T flattening: aVL    Clinical impression: normal ECG  Comments: Indication: CAD              Assessment:       Diagnosis Plan   1. Coronary artery disease involving native coronary artery of native heart without angina pectoris  ECG 12 Lead   2. S/P AVR (aortic " valve replacement)     3. S/P MVR (mitral valve repair)     4. Essential hypertension       Orders Placed This Encounter   Procedures   • ECG 12 Lead     This order was created via procedure documentation          Plan:       1.  Coronary artery disease.  Previous inferior lateral STEMI with PCI to the left circumflex.  He denies any symptoms of angina.  He is on good medical therapy.      2.  Aortic valve stenosis status post aortic valve replacement in 2015.  His last echocardiogram was in 2018 which revealed a well-functioning prosthetic aortic valve.      3.  Hypertension.  His blood pressure looks great.      I think he is stable and doing well.  He denies any symptoms of angina or heart failure.  I am not going to make any changes, and he will follow-up with Dr. Varghese in 6 months or sooner if needed.      As always, it has been a pleasure to participate in your patient's care.      Sincerely,         CLAIR Wolf

## 2020-10-27 ENCOUNTER — OFFICE VISIT (OUTPATIENT)
Dept: CARDIOLOGY | Facility: CLINIC | Age: 80
End: 2020-10-27

## 2020-10-27 VITALS
SYSTOLIC BLOOD PRESSURE: 112 MMHG | HEART RATE: 68 BPM | RESPIRATION RATE: 18 BRPM | HEIGHT: 72 IN | OXYGEN SATURATION: 98 % | BODY MASS INDEX: 26.01 KG/M2 | DIASTOLIC BLOOD PRESSURE: 66 MMHG | WEIGHT: 192 LBS

## 2020-10-27 DIAGNOSIS — Z95.2 S/P AVR (AORTIC VALVE REPLACEMENT): ICD-10-CM

## 2020-10-27 DIAGNOSIS — I25.10 CORONARY ARTERY DISEASE INVOLVING NATIVE CORONARY ARTERY OF NATIVE HEART WITHOUT ANGINA PECTORIS: Primary | ICD-10-CM

## 2020-10-27 DIAGNOSIS — Z98.890 S/P MVR (MITRAL VALVE REPAIR): ICD-10-CM

## 2020-10-27 DIAGNOSIS — I10 ESSENTIAL HYPERTENSION: ICD-10-CM

## 2020-10-27 PROCEDURE — 99214 OFFICE O/P EST MOD 30 MIN: CPT | Performed by: NURSE PRACTITIONER

## 2020-10-27 PROCEDURE — 93000 ELECTROCARDIOGRAM COMPLETE: CPT | Performed by: NURSE PRACTITIONER

## 2020-10-27 RX ORDER — FUROSEMIDE 20 MG/1
20 TABLET ORAL DAILY
COMMUNITY
Start: 2020-10-13 | End: 2022-06-23 | Stop reason: ALTCHOICE

## 2020-10-27 RX ORDER — GLIMEPIRIDE 2 MG/1
2 TABLET ORAL
COMMUNITY
End: 2022-06-23 | Stop reason: ALTCHOICE

## 2020-10-27 RX ORDER — AMLODIPINE BESYLATE 5 MG/1
5 TABLET ORAL DAILY
COMMUNITY
End: 2020-10-27 | Stop reason: SDUPTHER

## 2020-11-03 VITALS
SYSTOLIC BLOOD PRESSURE: 109 MMHG | HEART RATE: 52 BPM | DIASTOLIC BLOOD PRESSURE: 54 MMHG | HEART RATE: 70 BPM | DIASTOLIC BLOOD PRESSURE: 553 MMHG | HEART RATE: 54 BPM | OXYGEN SATURATION: 95 % | DIASTOLIC BLOOD PRESSURE: 80 MMHG | RESPIRATION RATE: 9 BRPM | SYSTOLIC BLOOD PRESSURE: 103 MMHG | DIASTOLIC BLOOD PRESSURE: 53 MMHG | RESPIRATION RATE: 12 BRPM | SYSTOLIC BLOOD PRESSURE: 93 MMHG | OXYGEN SATURATION: 100 % | OXYGEN SATURATION: 99 % | WEIGHT: 192 LBS | HEART RATE: 65 BPM | SYSTOLIC BLOOD PRESSURE: 154 MMHG | HEART RATE: 63 BPM | DIASTOLIC BLOOD PRESSURE: 77 MMHG | DIASTOLIC BLOOD PRESSURE: 78 MMHG | SYSTOLIC BLOOD PRESSURE: 88 MMHG | TEMPERATURE: 98.1 F | SYSTOLIC BLOOD PRESSURE: 104 MMHG | SYSTOLIC BLOOD PRESSURE: 86 MMHG | HEART RATE: 66 BPM | SYSTOLIC BLOOD PRESSURE: 915 MMHG | DIASTOLIC BLOOD PRESSURE: 48 MMHG | DIASTOLIC BLOOD PRESSURE: 45 MMHG | HEART RATE: 64 BPM | DIASTOLIC BLOOD PRESSURE: 66 MMHG | SYSTOLIC BLOOD PRESSURE: 95 MMHG | RESPIRATION RATE: 14 BRPM | RESPIRATION RATE: 8 BRPM | DIASTOLIC BLOOD PRESSURE: 64 MMHG | OXYGEN SATURATION: 97 % | TEMPERATURE: 96.9 F | RESPIRATION RATE: 13 BRPM | HEART RATE: 55 BPM | SYSTOLIC BLOOD PRESSURE: 152 MMHG | HEIGHT: 72 IN | SYSTOLIC BLOOD PRESSURE: 114 MMHG | SYSTOLIC BLOOD PRESSURE: 100 MMHG | HEART RATE: 69 BPM | HEART RATE: 56 BPM | DIASTOLIC BLOOD PRESSURE: 61 MMHG | SYSTOLIC BLOOD PRESSURE: 146 MMHG | HEART RATE: 61 BPM | OXYGEN SATURATION: 98 % | DIASTOLIC BLOOD PRESSURE: 58 MMHG

## 2020-11-06 ENCOUNTER — OFFICE (AMBULATORY)
Dept: URBAN - METROPOLITAN AREA PATHOLOGY 4 | Facility: PATHOLOGY | Age: 80
End: 2020-11-06
Payer: MEDICARE

## 2020-11-06 ENCOUNTER — AMBULATORY SURGICAL CENTER (AMBULATORY)
Dept: URBAN - METROPOLITAN AREA SURGERY 17 | Facility: SURGERY | Age: 80
End: 2020-11-06
Payer: COMMERCIAL

## 2020-11-06 DIAGNOSIS — D12.4 BENIGN NEOPLASM OF DESCENDING COLON: ICD-10-CM

## 2020-11-06 DIAGNOSIS — D12.2 BENIGN NEOPLASM OF ASCENDING COLON: ICD-10-CM

## 2020-11-06 DIAGNOSIS — D12.5 BENIGN NEOPLASM OF SIGMOID COLON: ICD-10-CM

## 2020-11-06 DIAGNOSIS — K64.8 OTHER HEMORRHOIDS: ICD-10-CM

## 2020-11-06 DIAGNOSIS — Z86.010 PERSONAL HISTORY OF COLONIC POLYPS: ICD-10-CM

## 2020-11-06 DIAGNOSIS — K64.4 RESIDUAL HEMORRHOIDAL SKIN TAGS: ICD-10-CM

## 2020-11-06 LAB
GI HISTOLOGY: A. UNSPECIFIED: (no result)
GI HISTOLOGY: B. UNSPECIFIED: (no result)
GI HISTOLOGY: C. UNSPECIFIED: (no result)
GI HISTOLOGY: PDF REPORT: (no result)

## 2020-11-06 PROCEDURE — 88305 TISSUE EXAM BY PATHOLOGIST: CPT | Performed by: INTERNAL MEDICINE

## 2020-11-06 PROCEDURE — 88305 TISSUE EXAM BY PATHOLOGIST: CPT | Mod: 26,TC | Performed by: INTERNAL MEDICINE

## 2020-11-06 PROCEDURE — 45380 COLONOSCOPY AND BIOPSY: CPT | Mod: 59,PT | Performed by: INTERNAL MEDICINE

## 2020-11-06 PROCEDURE — 45385 COLONOSCOPY W/LESION REMOVAL: CPT | Mod: PT | Performed by: INTERNAL MEDICINE

## 2020-11-06 NOTE — SERVICEHPINOTES
SANTOSH MCGILL  is a  80  male   who presents today for a  Colonoscopy   for   the indications listed below. The updated Patient Profile was reviewed prior to the procedure, in conjunction with the Physical Exam, including medical conditions, surgical procedures, medications, allergies, family history and social history. See Physical Exam time stamp below for date and time of HPI completion.Pre-operatively, I reviewed the indication(s) for the procedure, the risks of the procedure [including but not limited to: unexpected bleeding possibly requiring hospitalization and/or unplanned repeat procedures, perforation possibly requiring surgical treatment, missed lesions and complications of sedation/MAC (also explained by anesthesia staff)]. I have evaluated the patient for risks associated with the planned anesthesia and the procedure to be performed and find the patient an acceptable candidate for IV sedation.Multiple opportunities were provided for any questions or concerns, and all questions were answered satisfactorily before any anesthesia was administered. We will proceed with the planned procedure.BR

## 2020-12-02 RX ORDER — AMOXICILLIN 500 MG/1
CAPSULE ORAL
Qty: 4 CAPSULE | Refills: 1 | Status: SHIPPED | OUTPATIENT
Start: 2020-12-02 | End: 2022-01-14

## 2020-12-15 RX ORDER — CARVEDILOL 6.25 MG/1
TABLET ORAL
Qty: 180 TABLET | Refills: 1 | Status: SHIPPED | OUTPATIENT
Start: 2020-12-15 | End: 2021-06-04

## 2021-03-15 ENCOUNTER — BULK ORDERING (OUTPATIENT)
Dept: CASE MANAGEMENT | Facility: OTHER | Age: 81
End: 2021-03-15

## 2021-03-15 DIAGNOSIS — Z23 IMMUNIZATION DUE: ICD-10-CM

## 2021-05-20 ENCOUNTER — OFFICE VISIT (OUTPATIENT)
Dept: CARDIOLOGY | Facility: CLINIC | Age: 81
End: 2021-05-20

## 2021-05-20 VITALS
HEART RATE: 62 BPM | WEIGHT: 214 LBS | SYSTOLIC BLOOD PRESSURE: 110 MMHG | BODY MASS INDEX: 28.99 KG/M2 | DIASTOLIC BLOOD PRESSURE: 74 MMHG | HEIGHT: 72 IN

## 2021-05-20 DIAGNOSIS — I25.10 CORONARY ARTERY DISEASE INVOLVING NATIVE CORONARY ARTERY OF NATIVE HEART WITHOUT ANGINA PECTORIS: ICD-10-CM

## 2021-05-20 DIAGNOSIS — I48.0 PAROXYSMAL ATRIAL FIBRILLATION (HCC): ICD-10-CM

## 2021-05-20 DIAGNOSIS — Z95.2 S/P AVR (AORTIC VALVE REPLACEMENT): ICD-10-CM

## 2021-05-20 DIAGNOSIS — Z98.890 S/P MVR (MITRAL VALVE REPAIR): Primary | ICD-10-CM

## 2021-05-20 DIAGNOSIS — I10 ESSENTIAL HYPERTENSION: ICD-10-CM

## 2021-05-20 PROCEDURE — 93000 ELECTROCARDIOGRAM COMPLETE: CPT | Performed by: INTERNAL MEDICINE

## 2021-05-20 PROCEDURE — 99214 OFFICE O/P EST MOD 30 MIN: CPT | Performed by: INTERNAL MEDICINE

## 2021-05-20 NOTE — PROGRESS NOTES
Date of Office Visit: 21  Encounter Provider: Liborio Varghese MD  Place of Service: [facility]  Patient Name: Vj Bright  :1940    Chief Complaint   Patient presents with   • Coronary artery disease involving native coronary artery of    • Essential hypertension   • Follow-up   :     HPI:   Vj Bright is a 80 y.o. male, known to me, who presents today for follow-up.  Old records have been obtained and reviewed by me.  He is a patient of Dr. Varghese's with a past cardiac history significant for coronary artery disease, paroxysmal atrial fibrillation, chronic diastolic heart failure, and aortic stenosis.  In 2015, he had a STEMI and underwent stent placement to his circumflex.  In 2015, he underwent an aortic valve replacement, mitral valve repair, and resection of subaortic stenosis.  Postoperatively, he developed some paroxysmal atrial fibrillation and was placed on amiodarone and warfarin.  He has had no recurrence of atrial fibrillation and amiodarone and warfarin have subsequently been discontinued.  His last echocardiogram was in 2018 which revealed normal LV function, a well-functioning prosthetic aortic valve with peak and mean gradients within defined limits, a well-functioning mitral valve ring with mild mitral regurgitation, and mild tricuspid regurgitation.      Past Medical History:   Diagnosis Date   • Acute respiratory failure (CMS/HCC)    • Aortic stenosis    • BPH (benign prostatic hyperplasia)    • CAD (coronary artery disease)    • Chest pain    • CHF (congestive heart failure) (CMS/HCC)    • Diabetes mellitus (CMS/HCC)    • Gout    • Hyperlipidemia    • Hypertension    • PAF (paroxysmal atrial fibrillation) (CMS/HCC)    • Pulmonary hypertension (CMS/HCC)    • STEMI (ST elevation myocardial infarction) (CMS/[facility])        Past Surgical History:   Procedure Laterality Date   • AORTIC VALVE REPAIR/REPLACEMENT      • CARDIAC SURGERY     • CARDIAC SURGERY     • CORONARY ANGIOPLASTY WITH STENT PLACEMENT     • MITRAL VALVE REPAIR/REPLACEMENT     • OTHER SURGICAL HISTORY      AORTIC VALVE REPAIR OF SUBVALVULAR AORTIC STENOSIS   • OTHER SURGICAL HISTORY      CARDIAC CATH PROCEDURE OUTCOME: SUCCESSFUL   • OTHER SURGICAL HISTORY      VENOUS THROMBECTOMY BY COMBINED LEG INCISION       Social History     Socioeconomic History   • Marital status:      Spouse name: Not on file   • Number of children: Not on file   • Years of education: Not on file   • Highest education level: Not on file   Tobacco Use   • Smoking status: Never Smoker   • Smokeless tobacco: Never Used   • Tobacco comment: CAFFEINE USE: OCC. CHOCOLATE CANDY   Substance and Sexual Activity   • Alcohol use: Yes     Alcohol/week: 1.0 standard drinks     Types: 1 Cans of beer per week     Comment: social   • Drug use: No   • Sexual activity: Defer       Family History   Problem Relation Age of Onset   • Hypertension Mother    • Heart attack Father        Review of Systems   Constitutional: Negative for chills, fever and malaise/fatigue.   Cardiovascular: Negative for chest pain, dyspnea on exertion, leg swelling, near-syncope, orthopnea, palpitations, paroxysmal nocturnal dyspnea and syncope.   Respiratory: Negative for cough and shortness of breath.    Musculoskeletal: Negative for joint pain, joint swelling and myalgias.   Gastrointestinal: Negative for abdominal pain, diarrhea, melena, nausea and vomiting.   Genitourinary: Negative for frequency and hematuria.   Neurological: Negative for light-headedness, numbness, paresthesias and seizures.   Allergic/Immunologic: Negative.    All other systems reviewed and are negative.      Allergies   Allergen Reactions   • Angiotensin Receptor Blockers Angioedema     Lip swelling         Current Outpatient Medications:   •  ACCU-CHEK BRANDON PLUS test strip, TEST DAILY AS DIRECTED, Disp: , Rfl: 1  •  ACCU-CHEK SOFTCLIX LANCETS  "lancets, TEST DAILY AS DIRECTED, Disp: , Rfl: 1  •  amLODIPine (NORVASC) 5 MG tablet, Take 1 tablet by mouth Daily., Disp: 30 tablet, Rfl: 11  •  amoxicillin (AMOXIL) 500 MG capsule, TAKE 4 CAPSULES BY MOUTH 1 HOUR PRIOR TO PROCEDURE, Disp: 4 capsule, Rfl: 1  •  aspirin 81 MG tablet, Take 1 tablet by mouth Daily., Disp: 30 tablet, Rfl: 11  •  atorvastatin (LIPITOR) 40 MG tablet, Take 1 tablet by mouth every night at bedtime., Disp: 90 tablet, Rfl: 3  •  carvedilol (COREG) 6.25 MG tablet, TAKE ONE TABLET BY MOUTH TWICE A DAY WITH MEALS, Disp: 180 tablet, Rfl: 1  •  Cholecalciferol (VITAMIN D-3) 1000 UNITS capsule, Take 1 capsule by mouth daily., Disp: , Rfl:   •  cloNIDine (CATAPRES) 0.3 MG tablet, Take 1 tablet by mouth Every Night., Disp: , Rfl:   •  furosemide (LASIX) 20 MG tablet, Take 20 mg by mouth Daily., Disp: , Rfl:   •  gabapentin (NEURONTIN) 100 MG capsule, Take 100 mg by mouth 3 (Three) Times a Day., Disp: , Rfl:   •  glimepiride (AMARYL) 2 MG tablet, Take 2 mg by mouth Every Morning Before Breakfast., Disp: , Rfl:   •  SITagliptin (JANUVIA) 100 MG tablet, Take 100 mg by mouth Daily., Disp: , Rfl:   •  tamsulosin (FLOMAX) 0.4 MG capsule 24 hr capsule, Take 2 capsules by mouth daily., Disp: , Rfl:   •  ULORIC 40 MG tablet, Take 1 tablet by mouth Daily., Disp: , Rfl: 3      Objective:     Vitals:    05/20/21 1110   BP: 110/74   BP Location: Right arm   Patient Position: Sitting   Pulse: 62   Weight: 97.1 kg (214 lb)   Height: 182.9 cm (72\")     Body mass index is 29.02 kg/m².    PHYSICAL EXAM:    Constitutional:       General: Not in acute distress.     Appearance: Well-developed. Not diaphoretic.   Eyes:      Pupils: Pupils are equal, round, and reactive to light.   HENT:      Head: Normocephalic and atraumatic.   Neck:      Thyroid: No thyromegaly.      Vascular: No JVD.   Pulmonary:      Effort: Pulmonary effort is normal. No respiratory distress.      Breath sounds: Normal breath sounds. "   Cardiovascular:      Normal rate. Regular rhythm.   Pulses:     Intact distal pulses.   Abdominal:      General: Bowel sounds are normal. There is no distension.      Palpations: Abdomen is soft. There is no hepatomegaly or splenomegaly.      Tenderness: There is no abdominal tenderness.   Musculoskeletal: Normal range of motion. Skin:     General: Skin is warm and dry.      Findings: No erythema.   Neurological:      Mental Status: Alert and oriented to person, place, and time.   Psychiatric:         Behavior: Behavior normal.         Judgment: Judgment normal.         Procedures      Assessment:       Diagnosis Plan   1. S/P MVR (mitral valve repair)     2. S/P AVR (aortic valve replacement)     3. Paroxysmal atrial fibrillation (CMS/HCC)     4. Essential hypertension     5. Coronary artery disease involving native coronary artery of native heart without angina pectoris       No orders of the defined types were placed in this encounter.         Plan:       1.  Coronary artery disease.  Previous inferior lateral STEMI with PCI to the left circumflex.  He denies any symptoms of angina.  He is on good medical therapy.      2.  Aortic valve stenosis status post aortic valve replacement in 2015.  His last echocardiogram was in 2018 which revealed a well-functioning prosthetic aortic valve.      3.  Hypertension.  His blood pressure looks great.

## 2021-05-20 NOTE — PROGRESS NOTES
Date of Office Visit: 21  Encounter Provider: Liborio Varghese MD  Place of Service: [facility] CARDIOLOGY  Patient Name: Vj Bright  :1940    Chief Complaint   Patient presents with   • Coronary artery disease involving native coronary artery of    • Essential hypertension   • Follow-up   :     HPI: Vj Bright is a 80 y.o. male, known to me, who presents today for follow-up. He has a past cardiac history significant for coronary artery disease, paroxysmal atrial fibrillation, chronic diastolic heart failure, and aortic stenosis.  In 2015, he had a STEMI and underwent stent placement to his circumflex.  In 2015, he underwent an aortic valve replacement, mitral valve repair, and resection of subaortic stenosis.  Postoperatively, he developed some paroxysmal atrial fibrillation and was placed on amiodarone and warfarin.  He has had no recurrence of atrial fibrillation and amiodarone and warfarin have subsequently been discontinued.  His last echocardiogram was in 2018 which revealed normal LV function, a well-functioning prosthetic aortic valve with peak and mean gradients within defined limits, a well-functioning mitral valve ring with mild mitral regurgitation, and mild tricuspid regurgitation.     Since our last visit he has been doing very well. He denies any chest pain or dyspnea. He does have bilateral lower extremity edema and wears compression stockings for that. He denies any orthopnea. His blood pressure and heart rate are well controlled currently.         Past Medical History:   Diagnosis Date   • Acute respiratory failure (CMS/[facility])    • Aortic stenosis    • BPH (benign prostatic hyperplasia)    • CAD (coronary artery disease)    • Chest pain    • CHF (congestive heart failure) (CMS/[facility])    • Diabetes mellitus (CMS/[facility])    • Gout    • Hyperlipidemia    • Hypertension    • PAF (paroxysmal atrial fibrillation) (CMS/[facility])    •  Pulmonary hypertension (CMS/HCC)    • STEMI (ST elevation myocardial infarction) (CMS/[facility])        Past Surgical History:   Procedure Laterality Date   • AORTIC VALVE REPAIR/REPLACEMENT     • CARDIAC SURGERY     • CARDIAC SURGERY     • CORONARY ANGIOPLASTY WITH STENT PLACEMENT     • MITRAL VALVE REPAIR/REPLACEMENT     • OTHER SURGICAL HISTORY      AORTIC VALVE REPAIR OF SUBVALVULAR AORTIC STENOSIS   • OTHER SURGICAL HISTORY      CARDIAC CATH PROCEDURE OUTCOME: SUCCESSFUL   • OTHER SURGICAL HISTORY      VENOUS THROMBECTOMY BY COMBINED LEG INCISION       Social History     Socioeconomic History   • Marital status:      Spouse name: Not on file   • Number of children: Not on file   • Years of education: Not on file   • Highest education level: Not on file   Tobacco Use   • Smoking status: Never Smoker   • Smokeless tobacco: Never Used   • Tobacco comment: CAFFEINE USE: OCC. CHOCOLATE CANDY   Substance and Sexual Activity   • Alcohol use: Yes     Alcohol/week: 1.0 standard drinks     Types: 1 Cans of beer per week     Comment: social   • Drug use: No   • Sexual activity: Defer       Family History   Problem Relation Age of Onset   • Hypertension Mother    • Heart attack Father        Review of Systems   Constitutional: Negative for chills, fever and malaise/fatigue.   HENT: Negative for nosebleeds and sore throat.    Eyes: Negative for blurred vision and double vision.   Cardiovascular: Positive for leg swelling. Negative for chest pain, claudication, dyspnea on exertion, near-syncope, orthopnea, palpitations, paroxysmal nocturnal dyspnea and syncope.   Respiratory: Negative for cough, shortness of breath and snoring.    Endocrine: Negative for cold intolerance, heat intolerance and polydipsia.   Skin: Negative for itching, poor wound healing and rash.   Musculoskeletal: Negative for joint pain, joint swelling, muscle weakness and myalgias.   Gastrointestinal: Negative for abdominal pain, diarrhea, melena, nausea  and vomiting.   Genitourinary: Negative for frequency and hematuria.   Neurological: Negative for light-headedness, loss of balance, numbness, paresthesias, seizures, vertigo and weakness.   Psychiatric/Behavioral: Negative for altered mental status and depression.   Allergic/Immunologic: Negative.    All other systems reviewed and are negative.      Allergies   Allergen Reactions   • Angiotensin Receptor Blockers Angioedema     Lip swelling         Current Outpatient Medications:   •  ACCU-CHEK BRANDON PLUS test strip, TEST DAILY AS DIRECTED, Disp: , Rfl: 1  •  ACCU-CHEK SOFTCLIX LANCETS lancets, TEST DAILY AS DIRECTED, Disp: , Rfl: 1  •  amLODIPine (NORVASC) 5 MG tablet, Take 1 tablet by mouth Daily., Disp: 30 tablet, Rfl: 11  •  amoxicillin (AMOXIL) 500 MG capsule, TAKE 4 CAPSULES BY MOUTH 1 HOUR PRIOR TO PROCEDURE, Disp: 4 capsule, Rfl: 1  •  aspirin 81 MG tablet, Take 1 tablet by mouth Daily., Disp: 30 tablet, Rfl: 11  •  atorvastatin (LIPITOR) 40 MG tablet, Take 1 tablet by mouth every night at bedtime., Disp: 90 tablet, Rfl: 3  •  carvedilol (COREG) 6.25 MG tablet, TAKE ONE TABLET BY MOUTH TWICE A DAY WITH MEALS, Disp: 180 tablet, Rfl: 1  •  Cholecalciferol (VITAMIN D-3) 1000 UNITS capsule, Take 1 capsule by mouth daily., Disp: , Rfl:   •  cloNIDine (CATAPRES) 0.3 MG tablet, Take 1 tablet by mouth Every Night., Disp: , Rfl:   •  furosemide (LASIX) 20 MG tablet, Take 20 mg by mouth Daily., Disp: , Rfl:   •  gabapentin (NEURONTIN) 100 MG capsule, Take 100 mg by mouth 3 (Three) Times a Day., Disp: , Rfl:   •  glimepiride (AMARYL) 2 MG tablet, Take 2 mg by mouth Every Morning Before Breakfast., Disp: , Rfl:   •  SITagliptin (JANUVIA) 100 MG tablet, Take 100 mg by mouth Daily., Disp: , Rfl:   •  tamsulosin (FLOMAX) 0.4 MG capsule 24 hr capsule, Take 2 capsules by mouth daily., Disp: , Rfl:   •  ULORIC 40 MG tablet, Take 1 tablet by mouth Daily., Disp: , Rfl: 3      Objective:     Vitals:    05/20/21 1110   BP:  "110/74   BP Location: Right arm   Patient Position: Sitting   Pulse: 62   Weight: 97.1 kg (214 lb)   Height: 182.9 cm (72\")     Body mass index is 29.02 kg/m².    PHYSICAL EXAM:    Constitutional:       General: Not in acute distress.     Appearance: Well-developed. Not diaphoretic.   Eyes:      General: No scleral icterus.     Conjunctiva/sclera: Conjunctivae normal.      Pupils: Pupils are equal, round, and reactive to light.   HENT:      Head: Normocephalic and atraumatic.   Neck:      Thyroid: No thyromegaly.      Vascular: Normal carotid pulses. No carotid bruit, hepatojugular reflux or JVD.      Trachea: No tracheal deviation.   Pulmonary:      Effort: Pulmonary effort is normal. No respiratory distress.      Breath sounds: Normal breath sounds. No decreased breath sounds. No wheezing. No rhonchi. No rales.   Chest:      Chest wall: Not tender to palpatation.   Cardiovascular:      Normal rate. Regular rhythm.      No gallop.   Edema:     Pretibial: bilateral 1+ edema of the pretibial area.     Ankle: bilateral 1+ edema of the ankle.     Feet: bilateral 1+ edema of the feet.  Abdominal:      General: Bowel sounds are normal. There is no distension.      Palpations: Abdomen is soft. There is no hepatomegaly or splenomegaly.      Tenderness: There is no abdominal tenderness.   Musculoskeletal: Normal range of motion.         General: No deformity.      Cervical back: Normal range of motion and neck supple. Skin:     General: Skin is warm and dry.      Findings: No erythema or rash.      Comments: Midline sternotomy.   Neurological:      Mental Status: Alert and oriented to person, place, and time.      Sensory: No sensory deficit.   Psychiatric:         Behavior: Behavior normal.         Judgment: Judgment normal.           ECG 12 Lead    Date/Time: 5/20/2021 11:31 AM  Performed by: Liborio Varghese MD  Authorized by: Liborio Varghese MD   Comparison: compared with previous ECG from " 10/27/2020  Similar to previous ECG  Rhythm: sinus rhythm  Rate: normal  QRS axis: normal    Clinical impression: non-specific ECG  Comments: Nonspecific T wave abnormalities lateral leads           9/28/2018  · Left ventricular systolic function is normal.  · The left ventricular cavity is mildly dilated.  · Left ventricular diastolic dysfunction (grade I) consistent with impaired relaxation.  · Left atrial cavity size is mildly dilated.  · There is a prosthetic aortic valve.  · Aortic valve mean pressure gradient is 6.6 mmHg.  · Aortic valve maximum pressure gradient is 12.8 mmHg.  · There is a mitral valve ring present.  · Mild mitral valve regurgitation is present  · Mild tricuspid valve regurgitation is present.  · Calculated right ventricular systolic pressure from tricuspid regurgitation is 25 mmHg      Assessment:      No diagnosis found.  No orders of the defined types were placed in this encounter.         Plan:    Very pleasant 80-year-old male with a prior history of inferolateral ST elevation MI, PCI to the circumflex, aortic valve stenosis with aortic valve replacement in 2015, essential hypertension, mitral valve repair and resection of aortic valve stenosis who presents back to me in followup. He has maintained in sinus rhythm. He denies any chest pain. He does have bilateral lower extremity edema but no significant issues with orthopnea or PND. I think he is euvolemic.            1.  Coronary artery disease.  Previous inferolateral STEMI with PCI to the left circumflex.  He denies any symptoms of angina.  He is on good medical therapy.  -Recommend continuing aspirin and statin at current dose.  No bleeding complications on aspirin.  -EKG unchanged from prior.    2.  Aortic valve stenosis status post aortic valve replacement in 2015.  His last echocardiogram was in 2018 which revealed a well-functioning prosthetic aortic valve.  -Continue aspirin lifelong.    3.  Hypertension. blood pressure looks  great today.  Continue amlodipine 5 mg p.o. daily along with clonidine and carvedilol.  No issues with symptomatic bradycardia on carvedilol therapy.  Mild bilateral lower extremity edema but I do not think we need to move off of amlodipine therapy.  Wearing compression stockings.    4.  Mitral valve repair: Echocardiogram images and report reviewed from postoperative state.  Mild residual mitral valve regurgitation    I will plan on seeing him back in 6 months or earlier with problems.

## 2021-06-04 RX ORDER — CARVEDILOL 6.25 MG/1
TABLET ORAL
Qty: 168 TABLET | Refills: 0 | Status: SHIPPED | OUTPATIENT
Start: 2021-06-04 | End: 2021-06-09

## 2021-06-09 RX ORDER — CARVEDILOL 6.25 MG/1
TABLET ORAL
Qty: 168 TABLET | Refills: 0 | Status: SHIPPED | OUTPATIENT
Start: 2021-06-09 | End: 2021-08-30

## 2021-08-30 RX ORDER — ATORVASTATIN CALCIUM 40 MG/1
TABLET, FILM COATED ORAL
Qty: 90 TABLET | Refills: 3 | Status: SHIPPED | OUTPATIENT
Start: 2021-08-30 | End: 2022-09-29

## 2021-08-30 RX ORDER — CARVEDILOL 6.25 MG/1
TABLET ORAL
Qty: 180 TABLET | Refills: 3 | Status: SHIPPED | OUTPATIENT
Start: 2021-08-30 | End: 2022-09-02

## 2021-12-02 ENCOUNTER — OFFICE VISIT (OUTPATIENT)
Dept: CARDIOLOGY | Facility: CLINIC | Age: 81
End: 2021-12-02

## 2021-12-02 VITALS
HEIGHT: 72 IN | DIASTOLIC BLOOD PRESSURE: 64 MMHG | WEIGHT: 195 LBS | SYSTOLIC BLOOD PRESSURE: 110 MMHG | BODY MASS INDEX: 26.41 KG/M2 | HEART RATE: 58 BPM

## 2021-12-02 DIAGNOSIS — I25.10 CORONARY ARTERY DISEASE INVOLVING NATIVE CORONARY ARTERY OF NATIVE HEART WITHOUT ANGINA PECTORIS: Primary | ICD-10-CM

## 2021-12-02 DIAGNOSIS — Z98.890 S/P MVR (MITRAL VALVE REPAIR): ICD-10-CM

## 2021-12-02 DIAGNOSIS — I10 ESSENTIAL HYPERTENSION: ICD-10-CM

## 2021-12-02 DIAGNOSIS — Z95.2 S/P AVR (AORTIC VALVE REPLACEMENT): ICD-10-CM

## 2021-12-02 PROCEDURE — 99214 OFFICE O/P EST MOD 30 MIN: CPT | Performed by: NURSE PRACTITIONER

## 2021-12-02 PROCEDURE — 93000 ELECTROCARDIOGRAM COMPLETE: CPT | Performed by: NURSE PRACTITIONER

## 2021-12-02 RX ORDER — LOSARTAN POTASSIUM 25 MG/1
25 TABLET ORAL DAILY
COMMUNITY
Start: 2021-07-13 | End: 2022-07-12 | Stop reason: SDUPTHER

## 2021-12-02 NOTE — PROGRESS NOTES
Date of Office Visit: 2021  Encounter Provider: CLAIR Rogers  Place of Service: [facility] CARDIOLOGY  Patient Name: Vj Bright  :1940    Chief Complaint   Patient presents with   • Coronary Artery Disease   :     HPI: Vj Bright is a 81 y.o. male.  He is a patient of Dr. Varghese's whom we follow for management of coronary artery disease, chronic diastolic CHF, and aortic stenosis.  In 2015, he had a STEMI and underwent stent placement to his circumflex.  In 2015, he underwent an aortic valve replacement, mitral valve repair, and resection of subaortic stenosis.  Postoperatively, he developed some paroxysmal atrial fibrillation and was placed on amiodarone and warfarin.  He has had no recurrence of atrial fibrillation and amiodarone and warfarin have subsequently been discontinued.  His last echocardiogram was in 2018 which revealed normal LV function, a well-functioning prosthetic aortic valve with peak and mean gradients within defined limits, a well-functioning mitral valve ring with mild mitral regurgitation, and mild tricuspid regurgitation.   He was last seen in the office by Dr. Varghese in May at which time he was doing well with no complaints of angina or heart failure.  He had chronic bilateral lower extremity edema for which he wore compression stockings.  No changes were made to his medical regimen, and he was advised to follow-up in 6 months.   He has been feeling well.  He denies any chest pain, shortness of breath, palpitations, edema, dizziness, or syncope.  He works part-time as a  and loves it.  He is also still playing tennis when the weather permits.    Past Medical History:   Diagnosis Date   • Acute respiratory failure (HCC)    • Aortic stenosis    • BPH (benign prostatic hyperplasia)    • CAD (coronary artery disease)    • Chest pain    • CHF (congestive heart failure) (HCC)    • Diabetes  mellitus (HCC)    • Gout    • Hyperlipidemia    • Hypertension    • PAF (paroxysmal atrial fibrillation) ([facility])    • Pulmonary hypertension (HCC)    • STEMI (ST elevation myocardial infarction) ([facility])        Past Surgical History:   Procedure Laterality Date   • AORTIC VALVE REPAIR/REPLACEMENT     • CARDIAC SURGERY     • CARDIAC SURGERY     • CORONARY ANGIOPLASTY WITH STENT PLACEMENT     • MITRAL VALVE REPAIR/REPLACEMENT     • OTHER SURGICAL HISTORY      AORTIC VALVE REPAIR OF SUBVALVULAR AORTIC STENOSIS   • OTHER SURGICAL HISTORY      CARDIAC CATH PROCEDURE OUTCOME: SUCCESSFUL   • OTHER SURGICAL HISTORY      VENOUS THROMBECTOMY BY COMBINED LEG INCISION       Social History     Socioeconomic History   • Marital status:    Tobacco Use   • Smoking status: Never Smoker   • Smokeless tobacco: Never Used   • Tobacco comment: CAFFEINE USE: OCC. CHOCOLATE CANDY   Substance and Sexual Activity   • Alcohol use: Yes     Alcohol/week: 1.0 standard drink     Types: 1 Cans of beer per week     Comment: social   • Drug use: No   • Sexual activity: Defer       Family History   Problem Relation Age of Onset   • Hypertension Mother    • Heart attack Father        Review of Systems   Constitutional: Negative.   Cardiovascular: Negative.  Negative for chest pain, dyspnea on exertion, leg swelling, orthopnea, paroxysmal nocturnal dyspnea and syncope.   Respiratory: Negative.    Hematologic/Lymphatic: Negative for bleeding problem.   Musculoskeletal: Negative for falls.   Gastrointestinal: Negative for melena.   Neurological: Negative for dizziness and light-headedness.       Allergies   Allergen Reactions   • Angiotensin Receptor Blockers Angioedema     Lip swelling         Current Outpatient Medications:   •  ACCU-CHEK BRANDON PLUS test strip, TEST DAILY AS DIRECTED, Disp: , Rfl: 1  •  ACCU-CHEK SOFTCLIX LANCETS lancets, TEST DAILY AS DIRECTED, Disp: , Rfl: 1  •  amoxicillin (AMOXIL) 500 MG capsule, TAKE 4 CAPSULES BY MOUTH 1 HOUR  "PRIOR TO PROCEDURE, Disp: 4 capsule, Rfl: 1  •  aspirin 81 MG tablet, Take 1 tablet by mouth Daily., Disp: 30 tablet, Rfl: 11  •  atorvastatin (LIPITOR) 40 MG tablet, TAKE ONE TABLET BY MOUTH EVERY NIGHT AT BEDTIME, Disp: 90 tablet, Rfl: 3  •  carvedilol (COREG) 6.25 MG tablet, TAKE ONE TABLET BY MOUTH TWICE A DAY WITH MEALS, Disp: 180 tablet, Rfl: 3  •  Cholecalciferol (VITAMIN D-3) 1000 UNITS capsule, Take 1 capsule by mouth daily., Disp: , Rfl:   •  cloNIDine (CATAPRES) 0.3 MG tablet, Take 1 tablet by mouth Every Night., Disp: , Rfl:   •  gabapentin (NEURONTIN) 100 MG capsule, Take 100 mg by mouth 3 (Three) Times a Day., Disp: , Rfl:   •  glimepiride (AMARYL) 2 MG tablet, Take 2 mg by mouth Every Morning Before Breakfast., Disp: , Rfl:   •  losartan (COZAAR) 25 MG tablet, Take 25 mg by mouth., Disp: , Rfl:   •  SITagliptin (JANUVIA) 100 MG tablet, Take 100 mg by mouth Daily., Disp: , Rfl:   •  tamsulosin (FLOMAX) 0.4 MG capsule 24 hr capsule, Take 2 capsules by mouth daily., Disp: , Rfl:   •  ULORIC 40 MG tablet, Take 1 tablet by mouth Daily., Disp: , Rfl: 3  •  furosemide (LASIX) 20 MG tablet, Take 20 mg by mouth Daily., Disp: , Rfl:       Objective:     Vitals:    12/02/21 1504   BP: 110/64   Pulse: 58   Weight: 88.5 kg (195 lb)   Height: 182.9 cm (72\")     Body mass index is 26.45 kg/m².    PHYSICAL EXAM:    Neck:      Vascular: No JVD.   Pulmonary:      Effort: Pulmonary effort is normal.      Breath sounds: Normal breath sounds.   Cardiovascular:      Normal rate. Regular rhythm.      Murmurs: There is no murmur.      No gallop. No click. No rub.   Pulses:     Intact distal pulses.           ECG 12 Lead    Date/Time: 12/2/2021 3:14 PM  Performed by: Vika Chaudhry APRN  Authorized by: Vika Chaudhry APRN   Comparison: compared with previous ECG from 5/20/2021  Similar to previous ECG  Rhythm: sinus rhythm  Rate: normal  BPM: 58  Q waves: III and aVF    T inversion: III, aVF and V6    Clinical " impression: abnormal EKG  Comments: Indication: Coronary artery disease              Assessment:       Diagnosis Plan   1. Coronary artery disease involving native coronary artery of native heart without angina pectoris  ECG 12 Lead   2. S/P AVR (aortic valve replacement)     3. S/P MVR (mitral valve repair)     4. Essential hypertension       Orders Placed This Encounter   Procedures   • ECG 12 Lead     This order was created via procedure documentation     Order Specific Question:   Release to patient     Answer:   Immediate          Plan:       1. Coronary artery disease.  History of inferolateral STEMI and stenting of the circumflex in 2015.  He does have new Q waves in leads III and aVF along with T wave inversions that were not present in May.  He denies any symptoms of angina.  I discussed with Dr. Varghese.  Given his lack of symptoms, we are not recommended further testing.  Continue aspirin and atorvastatin.      2.   Aortic stenosis.  Status post aortic valve replacement and resection of subaortic stenosis as well as mitral valve repair in 2015.  Echocardiogram from 2018 demonstrated a well-functioning prosthetic valve with normal LV function.      3.   Hypertension.  His blood pressure looks great.      Overall I think he stable.  He denies any symptoms of angina or heart failure.  I encouraged him to call me should he any new symptoms arise.  Otherwise, he will follow-up with Dr. Varghese in 6 months.      As always, it has been a pleasure to participate in your patient's care.      Sincerely,         CLAIR Wolf

## 2022-01-14 RX ORDER — AMOXICILLIN 500 MG/1
CAPSULE ORAL
Qty: 4 CAPSULE | Refills: 1 | Status: SHIPPED | OUTPATIENT
Start: 2022-01-14

## 2022-06-23 ENCOUNTER — OFFICE VISIT (OUTPATIENT)
Dept: CARDIOLOGY | Facility: CLINIC | Age: 82
End: 2022-06-23

## 2022-06-23 VITALS
BODY MASS INDEX: 26.82 KG/M2 | WEIGHT: 198 LBS | HEIGHT: 72 IN | SYSTOLIC BLOOD PRESSURE: 140 MMHG | DIASTOLIC BLOOD PRESSURE: 80 MMHG | HEART RATE: 66 BPM

## 2022-06-23 DIAGNOSIS — I10 ESSENTIAL HYPERTENSION: ICD-10-CM

## 2022-06-23 DIAGNOSIS — Z98.890 S/P MVR (MITRAL VALVE REPAIR): ICD-10-CM

## 2022-06-23 DIAGNOSIS — Z95.2 S/P AVR (AORTIC VALVE REPLACEMENT): ICD-10-CM

## 2022-06-23 DIAGNOSIS — I25.10 CORONARY ARTERY DISEASE INVOLVING NATIVE CORONARY ARTERY OF NATIVE HEART WITHOUT ANGINA PECTORIS: Primary | ICD-10-CM

## 2022-06-23 PROCEDURE — 99214 OFFICE O/P EST MOD 30 MIN: CPT | Performed by: INTERNAL MEDICINE

## 2022-06-23 PROCEDURE — 93000 ELECTROCARDIOGRAM COMPLETE: CPT | Performed by: INTERNAL MEDICINE

## 2022-06-23 RX ORDER — CLONIDINE HYDROCHLORIDE 0.3 MG/1
0.5 TABLET ORAL 2 TIMES DAILY
COMMUNITY
Start: 2022-06-20

## 2022-06-23 RX ORDER — GLIMEPIRIDE 4 MG/1
4 TABLET ORAL DAILY
COMMUNITY
Start: 2022-02-03

## 2022-06-23 RX ORDER — GABAPENTIN 400 MG/1
1 CAPSULE ORAL 3 TIMES DAILY
COMMUNITY
Start: 2022-05-04

## 2022-06-23 RX ORDER — FUROSEMIDE 20 MG/1
1 TABLET ORAL DAILY
COMMUNITY
Start: 2022-05-02

## 2022-06-23 NOTE — PROGRESS NOTES
Date of Office Visit: 22  Encounter Provider: Liborio Varghese MD  Place of Service: [facility] CARDIOLOGY  Patient Name: Vj Bright  :1940    Chief Complaint   Patient presents with   • Coronary Artery Disease   • S/P MVR   • Follow-up     6 month   :     HPI:  81 y.o. male with a history of coronary artery disease, paroxysmal atrial fibrillation, chronic diastolic heart failure, and aortic stenosis.  In 2015, he had a STEMI and underwent stent placement to his circumflex.  In 2015, he underwent an aortic valve replacement, mitral valve repair, and resection of subaortic stenosis.  Postoperatively, he developed some paroxysmal atrial fibrillation and was placed on amiodarone and warfarin.  He has had no recurrence of atrial fibrillation and amiodarone and warfarin have subsequently been discontinued.  His last echocardiogram was in 2018 which revealed normal LV function, a well-functioning prosthetic aortic valve with peak and mean gradients within defined limits, a well-functioning mitral valve ring with mild mitral regurgitation, and mild tricuspid regurgitation.    Since her last visit has been doing well.  He does complain that he will occasionally have lightheadedness when he stands from a seated position.  He denies any syncope but has noticed this more frequently as of late.  No chest pain, orthopnea or PND     Past Medical History:   Diagnosis Date   • Acute respiratory failure ([facility])    • Aortic stenosis    • BPH (benign prostatic hyperplasia)    • CAD (coronary artery disease)    • Chest pain    • CHF (congestive heart failure) ([facility])    • Diabetes mellitus ([facility])    • Gout    • Hyperlipidemia    • Hypertension    • PAF (paroxysmal atrial fibrillation) ([facility])    • Pulmonary hypertension ([facility])    • STEMI (ST elevation myocardial infarction) ([facility])        Past Surgical History:   Procedure Laterality Date   • AORTIC VALVE  REPAIR/REPLACEMENT     • CARDIAC SURGERY     • CARDIAC SURGERY     • CORONARY ANGIOPLASTY WITH STENT PLACEMENT     • MITRAL VALVE REPAIR/REPLACEMENT     • OTHER SURGICAL HISTORY      AORTIC VALVE REPAIR OF SUBVALVULAR AORTIC STENOSIS   • OTHER SURGICAL HISTORY      CARDIAC CATH PROCEDURE OUTCOME: SUCCESSFUL   • OTHER SURGICAL HISTORY      VENOUS THROMBECTOMY BY COMBINED LEG INCISION       Social History     Socioeconomic History   • Marital status:    Tobacco Use   • Smoking status: Never Smoker   • Smokeless tobacco: Never Used   • Tobacco comment: CAFFEINE USE: OCC. CHOCOLATE CANDY   Substance and Sexual Activity   • Alcohol use: Yes     Alcohol/week: 1.0 standard drink     Types: 1 Cans of beer per week     Comment: social   • Drug use: No   • Sexual activity: Defer       Family History   Problem Relation Age of Onset   • Hypertension Mother    • Heart attack Father        Review of Systems   Constitutional: Negative for chills, fever and malaise/fatigue.   HENT: Negative for nosebleeds and sore throat.    Eyes: Negative for blurred vision and double vision.   Cardiovascular: Positive for leg swelling. Negative for chest pain, claudication, dyspnea on exertion, near-syncope, orthopnea, palpitations, paroxysmal nocturnal dyspnea and syncope.   Respiratory: Negative for cough, shortness of breath and snoring.    Endocrine: Negative for cold intolerance, heat intolerance and polydipsia.   Skin: Negative for itching, poor wound healing and rash.   Musculoskeletal: Negative for joint pain, joint swelling, muscle weakness and myalgias.   Gastrointestinal: Negative for abdominal pain, diarrhea, melena, nausea and vomiting.   Genitourinary: Negative for frequency and hematuria.   Neurological: Negative for light-headedness, loss of balance, numbness, paresthesias, seizures, vertigo and weakness.   Psychiatric/Behavioral: Negative for altered mental status and depression.   Allergic/Immunologic: Negative.    All  "other systems reviewed and are negative.      Allergies   Allergen Reactions   • Angiotensin Receptor Blockers Angioedema     Lip swelling         Current Outpatient Medications:   •  ACCU-CHEK BRANDON PLUS test strip, TEST DAILY AS DIRECTED, Disp: , Rfl: 1  •  ACCU-CHEK SOFTCLIX LANCETS lancets, TEST DAILY AS DIRECTED, Disp: , Rfl: 1  •  amoxicillin (AMOXIL) 500 MG capsule, TAKE 4 CAPSULES BY MOUTH 1 HOUR PRIOR TO PROCEDURE, Disp: 4 capsule, Rfl: 1  •  aspirin 81 MG tablet, Take 1 tablet by mouth Daily., Disp: 30 tablet, Rfl: 11  •  atorvastatin (LIPITOR) 40 MG tablet, TAKE ONE TABLET BY MOUTH EVERY NIGHT AT BEDTIME, Disp: 90 tablet, Rfl: 3  •  carvedilol (COREG) 6.25 MG tablet, TAKE ONE TABLET BY MOUTH TWICE A DAY WITH MEALS, Disp: 180 tablet, Rfl: 3  •  Cholecalciferol (VITAMIN D-3) 1000 UNITS capsule, Take 1 capsule by mouth daily., Disp: , Rfl:   •  cloNIDine (CATAPRES) 0.3 MG tablet, Take 1 tablet by mouth Every Night., Disp: , Rfl:   •  furosemide (LASIX) 20 MG tablet, Take 20 mg by mouth Daily., Disp: , Rfl:   •  gabapentin (NEURONTIN) 100 MG capsule, Take 100 mg by mouth 3 (Three) Times a Day., Disp: , Rfl:   •  glimepiride (AMARYL) 2 MG tablet, Take 2 mg by mouth Every Morning Before Breakfast., Disp: , Rfl:   •  losartan (COZAAR) 25 MG tablet, Take 25 mg by mouth., Disp: , Rfl:   •  SITagliptin (JANUVIA) 100 MG tablet, Take 100 mg by mouth Daily., Disp: , Rfl:   •  tamsulosin (FLOMAX) 0.4 MG capsule 24 hr capsule, Take 2 capsules by mouth daily., Disp: , Rfl:   •  ULORIC 40 MG tablet, Take 1 tablet by mouth Daily., Disp: , Rfl: 3      Objective:     Vitals:    06/23/22 1421   Height: 182.9 cm (72\")     Body mass index is 26.45 kg/m².    PHYSICAL EXAM:    Constitutional:       General: Not in acute distress.     Appearance: Well-developed. Not diaphoretic.   Eyes:      General: No scleral icterus.     Conjunctiva/sclera: Conjunctivae normal.      Pupils: Pupils are equal, round, and reactive to light. "   HENT:      Head: Normocephalic and atraumatic.   Neck:      Thyroid: No thyromegaly.      Vascular: Normal carotid pulses. No carotid bruit, hepatojugular reflux or JVD.      Trachea: No tracheal deviation.   Pulmonary:      Effort: Pulmonary effort is normal. No respiratory distress.      Breath sounds: Normal breath sounds. No decreased breath sounds. No wheezing. No rhonchi. No rales.   Chest:      Chest wall: Not tender to palpatation.   Cardiovascular:      Normal rate. Regular rhythm.      No gallop.   Edema:     Pretibial: bilateral 1+ edema of the pretibial area.     Ankle: bilateral 1+ edema of the ankle.     Feet: bilateral 1+ edema of the feet.  Abdominal:      General: Bowel sounds are normal. There is no distension.      Palpations: Abdomen is soft. There is no hepatomegaly or splenomegaly.      Tenderness: There is no abdominal tenderness.   Musculoskeletal: Normal range of motion.         General: No deformity.      Cervical back: Normal range of motion and neck supple. Skin:     General: Skin is warm and dry.      Findings: No erythema or rash.      Comments: Midline sternotomy.   Neurological:      Mental Status: Alert and oriented to person, place, and time.      Sensory: No sensory deficit.   Psychiatric:         Behavior: Behavior normal.         Judgment: Judgment normal.           ECG 12 Lead    Date/Time: 6/23/2022 3:00 PM  Performed by: Liborio Varghese MD  Authorized by: Liborio Varghese MD   Comparison: compared with previous ECG from 12/2/2021  Rhythm: sinus rhythm  Rate: normal  QRS axis: normal  Comments: Nonspecific T wave abnormalities diffuse leads.                 9/28/2018  · Left ventricular systolic function is normal.  · The left ventricular cavity is mildly dilated.  · Left ventricular diastolic dysfunction (grade I) consistent with impaired relaxation.  · Left atrial cavity size is mildly dilated.  · There is a prosthetic aortic valve.  · Aortic valve mean  pressure gradient is 6.6 mmHg.  · Aortic valve maximum pressure gradient is 12.8 mmHg.  · There is a mitral valve ring present.  · Mild mitral valve regurgitation is present  · Mild tricuspid valve regurgitation is present.  · Calculated right ventricular systolic pressure from tricuspid regurgitation is 25 mmHg      Assessment:      No diagnosis found.  No orders of the defined types were placed in this encounter.         Plan:   81-year-old male with a prior history of inferolateral ST elevation MI, PCI to the circumflex, aortic valve stenosis with aortic valve replacement in 2015, essential hypertension, mitral valve repair and resection of aortic valve stenosis who presents back to me in followup. He has maintained in sinus rhythm. He denies any chest pain.  He does complain of intermittent lightheadedness with standing from a seated position and I do wonder if he is a little bit dry.  Have encouraged him to cut back on his Lasix to 20 mg p.o. daily for couple weeks and see if this makes a difference.  He will call me back after.    1.  Coronary artery disease.  Previous inferolateral STEMI with PCI to the left circumflex.  He denies any symptoms of angina.  He is on good medical therapy.  -Recommend continuing aspirin and statin at current dose.  No bleeding complications on aspirin.  -EKG unchanged from prior.    2.  Aortic valve stenosis status post aortic valve replacement in 2015.  His last echocardiogram was in 2018 which revealed a well-functioning prosthetic aortic valve.  -Continue aspirin lifelong.    3.  Hypertension. blood pressure looks great today.  Continue amlodipine 5 mg p.o. daily along with clonidine and carvedilol.  No issues with symptomatic bradycardia on carvedilol therapy.  No significant lower extremity edema.    4.  Mitral valve repair: Echocardiogram images and report reviewed from postoperative state.  Mild residual mitral valve regurgitation

## 2022-07-11 ENCOUNTER — TELEPHONE (OUTPATIENT)
Dept: CARDIOLOGY | Facility: CLINIC | Age: 82
End: 2022-07-11

## 2022-07-11 DIAGNOSIS — I10 ESSENTIAL HYPERTENSION: Primary | ICD-10-CM

## 2022-07-11 NOTE — TELEPHONE ENCOUNTER
See below to the following BP list the pt dropped off.      Please advise of any medication changes.    Thanks,  Lyric      On 6/23/22:    Plan:   81-year-old male with a prior history of inferolateral ST elevation MI, PCI to the circumflex, aortic valve stenosis with aortic valve replacement in 2015, essential hypertension, mitral valve repair and resection of aortic valve stenosis who presents back to me in followup. He has maintained in sinus rhythm. He denies any chest pain.  He does complain of intermittent lightheadedness with standing from a seated position and I do wonder if he is a little bit dry.  Have encouraged him to cut back on his Lasix to 20 mg p.o. daily for couple weeks and see if this makes a difference.  He will call me back after.    3.  Hypertension. blood pressure looks great today.  Continue amlodipine 5 mg p.o. daily along with clonidine and carvedilol.  No issues with symptomatic bradycardia on carvedilol therapy.  No significant lower extremity edema.

## 2022-07-12 RX ORDER — LOSARTAN POTASSIUM 50 MG/1
50 TABLET ORAL DAILY
Qty: 90 TABLET | Refills: 1 | Status: SHIPPED | OUTPATIENT
Start: 2022-07-12 | End: 2022-09-01 | Stop reason: SDUPTHER

## 2022-07-12 NOTE — TELEPHONE ENCOUNTER
Called pt and gave the instructions to his wife. Sent in a new prescription to his local pharmacy. He will come to the outpatient lab in 1 week for labs.    Lyric

## 2022-07-18 ENCOUNTER — LAB (OUTPATIENT)
Dept: LAB | Facility: HOSPITAL | Age: 82
End: 2022-07-18

## 2022-07-18 DIAGNOSIS — I10 ESSENTIAL HYPERTENSION: ICD-10-CM

## 2022-07-18 LAB
ANION GAP SERPL CALCULATED.3IONS-SCNC: 7.2 MMOL/L (ref 5–15)
BUN SERPL-MCNC: 19 MG/DL (ref 8–23)
BUN/CREAT SERPL: 11.9 (ref 7–25)
CALCIUM SPEC-SCNC: 8.8 MG/DL (ref 8.6–10.5)
CHLORIDE SERPL-SCNC: 104 MMOL/L (ref 98–107)
CO2 SERPL-SCNC: 25.8 MMOL/L (ref 22–29)
CREAT SERPL-MCNC: 1.59 MG/DL (ref 0.76–1.27)
EGFRCR SERPLBLD CKD-EPI 2021: 43.1 ML/MIN/1.73
GLUCOSE SERPL-MCNC: 50 MG/DL (ref 65–99)
POTASSIUM SERPL-SCNC: 4.6 MMOL/L (ref 3.5–5.2)
SODIUM SERPL-SCNC: 137 MMOL/L (ref 136–145)

## 2022-07-18 PROCEDURE — 80048 BASIC METABOLIC PNL TOTAL CA: CPT

## 2022-07-18 PROCEDURE — 36415 COLL VENOUS BLD VENIPUNCTURE: CPT

## 2022-07-21 ENCOUNTER — TELEPHONE (OUTPATIENT)
Dept: ONCOLOGY | Facility: OTHER | Age: 82
End: 2022-07-21

## 2022-09-01 RX ORDER — LOSARTAN POTASSIUM 50 MG/1
50 TABLET ORAL DAILY
Qty: 90 TABLET | Refills: 1 | Status: SHIPPED | OUTPATIENT
Start: 2022-09-01

## 2022-09-02 RX ORDER — CARVEDILOL 6.25 MG/1
TABLET ORAL
Qty: 180 TABLET | Refills: 3 | Status: SHIPPED | OUTPATIENT
Start: 2022-09-02

## 2022-09-29 RX ORDER — ATORVASTATIN CALCIUM 40 MG/1
TABLET, FILM COATED ORAL
Qty: 90 TABLET | Refills: 3 | Status: SHIPPED | OUTPATIENT
Start: 2022-09-29

## 2023-01-12 ENCOUNTER — OFFICE VISIT (OUTPATIENT)
Dept: CARDIOLOGY | Facility: CLINIC | Age: 83
End: 2023-01-12
Payer: MEDICARE

## 2023-01-12 VITALS
HEIGHT: 72 IN | DIASTOLIC BLOOD PRESSURE: 68 MMHG | BODY MASS INDEX: 26.01 KG/M2 | WEIGHT: 192 LBS | HEART RATE: 59 BPM | SYSTOLIC BLOOD PRESSURE: 110 MMHG

## 2023-01-12 DIAGNOSIS — Z98.890 S/P MVR (MITRAL VALVE REPAIR): Primary | ICD-10-CM

## 2023-01-12 DIAGNOSIS — Z95.2 S/P AVR (AORTIC VALVE REPLACEMENT): ICD-10-CM

## 2023-01-12 DIAGNOSIS — I25.10 CORONARY ARTERY DISEASE INVOLVING NATIVE CORONARY ARTERY OF NATIVE HEART WITHOUT ANGINA PECTORIS: ICD-10-CM

## 2023-01-12 DIAGNOSIS — I10 ESSENTIAL HYPERTENSION: ICD-10-CM

## 2023-01-12 PROCEDURE — 93000 ELECTROCARDIOGRAM COMPLETE: CPT | Performed by: INTERNAL MEDICINE

## 2023-01-12 PROCEDURE — 99214 OFFICE O/P EST MOD 30 MIN: CPT | Performed by: INTERNAL MEDICINE

## 2023-01-12 NOTE — PROGRESS NOTES
Date of Office Visit: 23  Encounter Provider: Liborio Varghese MD  Place of Service: [facility] CARDIOLOGY  Patient Name: Vj Bright  :1940    Chief Complaint   Patient presents with   • Coronary Artery Disease   Aortic valve replacement and mitral valve repair    HPI:  82 y.o. male with a history of coronary artery disease, paroxysmal atrial fibrillation, chronic diastolic heart failure, and aortic stenosis.  In 2015, he had a STEMI and underwent stent placement to his circumflex.  In 2015, he underwent an aortic valve replacement, mitral valve repair, and resection of subaortic stenosis.  Postoperatively, he developed some paroxysmal atrial fibrillation and was placed on amiodarone and warfarin.  He has had no recurrence of atrial fibrillation and amiodarone and warfarin have subsequently been discontinued.  His last echocardiogram was in 2018 which revealed normal LV function, a well-functioning prosthetic aortic valve with peak and mean gradients within defined limits, a well-functioning mitral valve ring with mild mitral regurgitation, and mild tricuspid regurgitation.    Since her last visit has been doing well.  He denies any chest pain or dyspnea on exertion.  He has been less active than he had been prior as he got let go from his part-time job.  His blood pressure and heart rate remain well controlled    Past Medical History:   Diagnosis Date   • Acute respiratory failure ([facility])    • Aortic stenosis    • BPH (benign prostatic hyperplasia)    • CAD (coronary artery disease)    • Chest pain    • CHF (congestive heart failure) ([facility])    • Diabetes mellitus ([facility])    • Gout    • Hyperlipidemia    • Hypertension    • PAF (paroxysmal atrial fibrillation) ([facility])    • Pulmonary hypertension ([facility])    • STEMI (ST elevation myocardial infarction) ([facility])        Past Surgical History:   Procedure Laterality Date   • AORTIC VALVE  REPAIR/REPLACEMENT     • CARDIAC SURGERY     • CARDIAC SURGERY     • CORONARY ANGIOPLASTY WITH STENT PLACEMENT     • MITRAL VALVE REPAIR/REPLACEMENT     • OTHER SURGICAL HISTORY      AORTIC VALVE REPAIR OF SUBVALVULAR AORTIC STENOSIS   • OTHER SURGICAL HISTORY      CARDIAC CATH PROCEDURE OUTCOME: SUCCESSFUL   • OTHER SURGICAL HISTORY      VENOUS THROMBECTOMY BY COMBINED LEG INCISION       Social History     Socioeconomic History   • Marital status:    Tobacco Use   • Smoking status: Never   • Smokeless tobacco: Never   • Tobacco comments:     CAFFEINE USE: OCC. CHOCOLATE CANDY   Substance and Sexual Activity   • Alcohol use: Yes     Alcohol/week: 1.0 standard drink     Types: 1 Cans of beer per week     Comment: social   • Drug use: No   • Sexual activity: Defer       Family History   Problem Relation Age of Onset   • Hypertension Mother    • Heart attack Father        Review of Systems   Constitutional: Negative for chills, fever and malaise/fatigue.   HENT: Negative for nosebleeds and sore throat.    Eyes: Negative for blurred vision and double vision.   Cardiovascular: Positive for leg swelling. Negative for chest pain, claudication, dyspnea on exertion, near-syncope, orthopnea, palpitations, paroxysmal nocturnal dyspnea and syncope.   Respiratory: Negative for cough, shortness of breath and snoring.    Endocrine: Negative for cold intolerance, heat intolerance and polydipsia.   Skin: Negative for itching, poor wound healing and rash.   Musculoskeletal: Negative for joint pain, joint swelling, muscle weakness and myalgias.   Gastrointestinal: Negative for abdominal pain, diarrhea, melena, nausea and vomiting.   Genitourinary: Negative for frequency and hematuria.   Neurological: Negative for light-headedness, loss of balance, numbness, paresthesias, seizures, vertigo and weakness.   Psychiatric/Behavioral: Negative for altered mental status and depression.   Allergic/Immunologic: Negative.    All other  "systems reviewed and are negative.      Allergies   Allergen Reactions   • Angiotensin Receptor Blockers Angioedema     Lip swelling         Current Outpatient Medications:   •  ACCU-CHEK BRANDON PLUS test strip, TEST DAILY AS DIRECTED, Disp: , Rfl: 1  •  ACCU-CHEK SOFTCLIX LANCETS lancets, TEST DAILY AS DIRECTED, Disp: , Rfl: 1  •  amoxicillin (AMOXIL) 500 MG capsule, TAKE 4 CAPSULES BY MOUTH 1 HOUR PRIOR TO PROCEDURE, Disp: 4 capsule, Rfl: 1  •  aspirin 81 MG tablet, Take 1 tablet by mouth Daily., Disp: 30 tablet, Rfl: 11  •  atorvastatin (LIPITOR) 40 MG tablet, TAKE ONE TABLET BY MOUTH EVERY NIGHT AT BEDTIME, Disp: 90 tablet, Rfl: 3  •  carvedilol (COREG) 6.25 MG tablet, TAKE ONE TABLET BY MOUTH TWICE A DAY WITH MEALS, Disp: 180 tablet, Rfl: 3  •  Cholecalciferol (VITAMIN D-3) 1000 UNITS capsule, Take 1 capsule by mouth daily., Disp: , Rfl:   •  cloNIDine (CATAPRES) 0.3 MG tablet, Take 0.5 tablets by mouth 2 (Two) Times a Day., Disp: , Rfl:   •  furosemide (LASIX) 20 MG tablet, Take 1 tablet by mouth Daily., Disp: , Rfl:   •  gabapentin (NEURONTIN) 400 MG capsule, Take 1 capsule by mouth 3 (Three) Times a Day., Disp: , Rfl:   •  glimepiride (AMARYL) 4 MG tablet, Take 4 mg by mouth Daily., Disp: , Rfl:   •  losartan (COZAAR) 50 MG tablet, Take 1 tablet by mouth Daily., Disp: 90 tablet, Rfl: 1  •  SITagliptin (JANUVIA) 100 MG tablet, Take 100 mg by mouth Daily., Disp: , Rfl:   •  tamsulosin (FLOMAX) 0.4 MG capsule 24 hr capsule, Take 2 capsules by mouth daily., Disp: , Rfl:   •  ULORIC 40 MG tablet, Take 1 tablet by mouth Daily., Disp: , Rfl: 3      Objective:     Vitals:    01/12/23 1523   Weight: 87.1 kg (192 lb)   Height: 182.9 cm (72\")     Body mass index is 26.04 kg/m².    PHYSICAL EXAM:    Constitutional:       General: Not in acute distress.     Appearance: Well-developed. Not diaphoretic.   Eyes:      General: No scleral icterus.     Conjunctiva/sclera: Conjunctivae normal.      Pupils: Pupils are equal, " round, and reactive to light.   HENT:      Head: Normocephalic and atraumatic.   Neck:      Thyroid: No thyromegaly.      Vascular: Normal carotid pulses. No carotid bruit, hepatojugular reflux or JVD.      Trachea: No tracheal deviation.   Pulmonary:      Effort: Pulmonary effort is normal. No respiratory distress.      Breath sounds: Normal breath sounds. No decreased breath sounds. No wheezing. No rhonchi. No rales.   Chest:      Chest wall: Not tender to palpatation.   Cardiovascular:      Normal rate. Regular rhythm.      No gallop.   Edema:     Pretibial: bilateral 1+ edema of the pretibial area.     Ankle: bilateral 1+ edema of the ankle.     Feet: bilateral 1+ edema of the feet.  Abdominal:      General: Bowel sounds are normal. There is no distension.      Palpations: Abdomen is soft. There is no hepatomegaly or splenomegaly.      Tenderness: There is no abdominal tenderness.   Musculoskeletal: Normal range of motion.         General: No deformity.      Cervical back: Normal range of motion and neck supple. Skin:     General: Skin is warm and dry.      Findings: No erythema or rash.      Comments: Midline sternotomy.   Neurological:      Mental Status: Alert and oriented to person, place, and time.      Sensory: No sensory deficit.   Psychiatric:         Behavior: Behavior normal.         Judgment: Judgment normal.           ECG 12 Lead    Date/Time: 1/12/2023 3:56 PM  Performed by: Liborio Varghese MD  Authorized by: Liborio Varghese MD   Comparison: compared with previous ECG from 6/23/2022  Similar to previous ECG  Rhythm: sinus rhythm  Rate: normal    Clinical impression: non-specific ECG  Comments: Nonspecific T wave abnormality inferior leads.                 9/28/2018  · Left ventricular systolic function is normal.  · The left ventricular cavity is mildly dilated.  · Left ventricular diastolic dysfunction (grade I) consistent with impaired relaxation.  · Left atrial cavity size is mildly  dilated.  · There is a prosthetic aortic valve.  · Aortic valve mean pressure gradient is 6.6 mmHg.  · Aortic valve maximum pressure gradient is 12.8 mmHg.  · There is a mitral valve ring present.  · Mild mitral valve regurgitation is present  · Mild tricuspid valve regurgitation is present.  · Calculated right ventricular systolic pressure from tricuspid regurgitation is 25 mmHg      Assessment:      No diagnosis found.  No orders of the defined types were placed in this encounter.         Plan:   82-year-old male with a prior history of inferolateral ST elevation MI, PCI to the circumflex, aortic valve stenosis with aortic valve replacement in 2015, essential hypertension, mitral valve repair and resection of aortic valve stenosis who presents back to me in followup. He has maintained in sinus rhythm. He denies any chest pain.     1.  Coronary artery disease.  Previous inferolateral STEMI with PCI to the left circumflex.  He denies any symptoms of angina.  He is on good medical therapy.  -Recommend continuing aspirin and statin at current dose.  No bleeding complications on aspirin.  -EKG unchanged from prior.    2.  Aortic valve stenosis status post aortic valve replacement in 2015.  His last echocardiogram was in 2018 which revealed a well-functioning prosthetic aortic valve.  -Continue aspirin lifelong.    3.  Hypertension. blood pressure looks great today.  Continue clonidine and carvedilol.  Was previously on amlodipine but stopped secondary to lower extremity edema    4.  Mitral valve repair: Echocardiogram images and report reviewed from postoperative state.  Mild residual mitral valve regurgitation

## 2023-09-05 RX ORDER — CARVEDILOL 6.25 MG/1
6.25 TABLET ORAL 2 TIMES DAILY WITH MEALS
Qty: 180 TABLET | Refills: 3 | Status: SHIPPED | OUTPATIENT
Start: 2023-09-05

## 2023-09-11 RX ORDER — LOSARTAN POTASSIUM 50 MG/1
TABLET ORAL
Qty: 90 TABLET | Refills: 1 | Status: SHIPPED | OUTPATIENT
Start: 2023-09-11

## 2023-09-25 RX ORDER — ATORVASTATIN CALCIUM 40 MG/1
TABLET, FILM COATED ORAL
Qty: 90 TABLET | Refills: 3 | Status: SHIPPED | OUTPATIENT
Start: 2023-09-25

## 2024-01-12 ENCOUNTER — OFFICE (AMBULATORY)
Dept: URBAN - METROPOLITAN AREA CLINIC 76 | Facility: CLINIC | Age: 84
End: 2024-01-12

## 2024-01-12 VITALS
DIASTOLIC BLOOD PRESSURE: 67 MMHG | HEART RATE: 62 BPM | HEIGHT: 72 IN | WEIGHT: 189 LBS | SYSTOLIC BLOOD PRESSURE: 117 MMHG | OXYGEN SATURATION: 98 %

## 2024-01-12 DIAGNOSIS — D12.2 BENIGN NEOPLASM OF ASCENDING COLON: ICD-10-CM

## 2024-01-12 DIAGNOSIS — K63.5 POLYP OF COLON: ICD-10-CM

## 2024-01-12 DIAGNOSIS — D12.6 BENIGN NEOPLASM OF COLON, UNSPECIFIED: ICD-10-CM

## 2024-01-12 DIAGNOSIS — R93.3 ABNORMAL FINDINGS ON DIAGNOSTIC IMAGING OF OTHER PARTS OF DI: ICD-10-CM

## 2024-01-12 DIAGNOSIS — Z86.010 PERSONAL HISTORY OF COLONIC POLYPS: ICD-10-CM

## 2024-01-12 DIAGNOSIS — R19.4 CHANGE IN BOWEL HABIT: ICD-10-CM

## 2024-01-12 DIAGNOSIS — D12.3 BENIGN NEOPLASM OF TRANSVERSE COLON: ICD-10-CM

## 2024-01-12 DIAGNOSIS — D12.4 BENIGN NEOPLASM OF DESCENDING COLON: ICD-10-CM

## 2024-01-12 DIAGNOSIS — D12.5 BENIGN NEOPLASM OF SIGMOID COLON: ICD-10-CM

## 2024-01-12 PROCEDURE — 99204 OFFICE O/P NEW MOD 45 MIN: CPT | Performed by: INTERNAL MEDICINE

## 2024-01-12 NOTE — SERVICEHPINOTES
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  He is due for colonoscopy.  He has no lower GI complaints, he does have dark stool but he is on iron.  He denies constipation.  There is no blood in the bowels.  There is no abdominal pain or weight loss.  He says that work cancers on his mom's side of the family but he is not specifically aware if there were any colon cancer's.
br
br He has no upper GI complaints.  There is no reflux, dysphagia, odynophagia nausea or vomiting.  There is no melena or hematemesis.  He does not smoke or drink.  He does take a baby aspirin daily.  He is in no acute distress.  He is also status post angioplasty with stent and aortic valve replacement with a porcine valve.  He is not on anticoagulation.

## 2024-01-15 ENCOUNTER — OFFICE VISIT (OUTPATIENT)
Dept: CARDIOLOGY | Facility: CLINIC | Age: 84
End: 2024-01-15
Payer: MEDICARE

## 2024-01-15 VITALS
HEIGHT: 72 IN | HEART RATE: 61 BPM | DIASTOLIC BLOOD PRESSURE: 62 MMHG | SYSTOLIC BLOOD PRESSURE: 108 MMHG | BODY MASS INDEX: 25.35 KG/M2 | WEIGHT: 187.2 LBS

## 2024-01-15 DIAGNOSIS — I25.2 HISTORY OF ST ELEVATION MYOCARDIAL INFARCTION (STEMI): ICD-10-CM

## 2024-01-15 DIAGNOSIS — Z98.890 S/P MVR (MITRAL VALVE REPAIR): ICD-10-CM

## 2024-01-15 DIAGNOSIS — I10 ESSENTIAL HYPERTENSION: ICD-10-CM

## 2024-01-15 DIAGNOSIS — Z95.2 S/P AVR (AORTIC VALVE REPLACEMENT): Primary | ICD-10-CM

## 2024-01-15 DIAGNOSIS — I25.10 CORONARY ARTERY DISEASE INVOLVING NATIVE CORONARY ARTERY OF NATIVE HEART WITHOUT ANGINA PECTORIS: ICD-10-CM

## 2024-01-15 RX ORDER — CETIRIZINE HYDROCHLORIDE 10 MG/1
10 TABLET ORAL DAILY
COMMUNITY
Start: 2023-12-05

## 2024-01-15 RX ORDER — FERROUS SULFATE 325(65) MG
325 TABLET ORAL
COMMUNITY
Start: 2024-01-10

## 2024-01-15 RX ORDER — DAPAGLIFLOZIN 10 MG/1
10 TABLET, FILM COATED ORAL
COMMUNITY
Start: 2023-10-09

## 2024-01-15 NOTE — PROGRESS NOTES
Date of Office Visit: 01/15/2024  Encounter Provider: CLAIR Leung  Place of Service: [facility] CARDIOLOGY  Patient Name: Vj Bright  :1940    No chief complaint on file.  : follow up    HPI: Vj Bright is a 83 y.o. male who is a patient of  Dr. Varghese.  Is new to me today and presents for 1 year office follow-up.  Has a history of coronary artery disease, paroxysmal atrial fibrillation, chronic diastolic heart failure and aortic stenosis.  In 2015, he had a STEMI and underwent stent placement to his circumflex artery.  In 2015, he underwent aortic valve replacement, mitral valve repair and resection of subaortic stenosis.  Postoperatively, he developed some paroxysmal atrial fibrillation and was placed on amiodarone and warfarin.  He has had no recurrence of atrial fibrillation, and amiodarone and warfarin have subsequently been continued.    Echocardiogram in 2018 revealed normal left ventricular function, well functioning prosthetic aortic valve with peak and mean gradients within defined limits, well-functioning mitral valve ring with mild mitral regurgitation and mild tricuspid regurgitation.    Mr. Bright presents today with no complaints of chest pain, shortness of air or lightheadedness.  Blood pressure is well controlled.  He plays tennis on a normal basis and recently joined [employer] at his [employer].  EKG comparable to EKG 2021.        Previous testing and notes have been reviewed by me.   Past Medical History:   Diagnosis Date    Acute respiratory failure     Aortic stenosis     BPH (benign prostatic hyperplasia)     CAD (coronary artery disease)     Chest pain     CHF (congestive heart failure)     Diabetes mellitus     Gout     Hyperlipidemia     Hypertension     PAF (paroxysmal atrial fibrillation)     Pulmonary hypertension     STEMI (ST elevation myocardial infarction)        Past Surgical History:   Procedure  Laterality Date    AORTIC VALVE REPAIR/REPLACEMENT      CARDIAC SURGERY      CARDIAC SURGERY      CORONARY ANGIOPLASTY WITH STENT PLACEMENT      MITRAL VALVE REPAIR/REPLACEMENT      OTHER SURGICAL HISTORY      AORTIC VALVE REPAIR OF SUBVALVULAR AORTIC STENOSIS    OTHER SURGICAL HISTORY      CARDIAC CATH PROCEDURE OUTCOME: SUCCESSFUL    OTHER SURGICAL HISTORY      VENOUS THROMBECTOMY BY COMBINED LEG INCISION       Social History     Socioeconomic History    Marital status:    Tobacco Use    Smoking status: Never    Smokeless tobacco: Never    Tobacco comments:     CAFFEINE USE: OCC. CHOCOLATE CANDY   Substance and Sexual Activity    Alcohol use: Yes     Alcohol/week: 1.0 standard drink of alcohol     Types: 1 Cans of beer per week     Comment: social    Drug use: No    Sexual activity: Defer       Family History   Problem Relation Age of Onset    Hypertension Mother     Heart attack Father        Review of Systems   Constitutional: Negative.   HENT: Negative.     Eyes: Negative.    Cardiovascular: Negative.    Respiratory: Negative.     Endocrine: Negative.    Hematologic/Lymphatic: Negative.    Skin: Negative.    Musculoskeletal: Negative.    Gastrointestinal: Negative.    Genitourinary: Negative.    Neurological: Negative.    Psychiatric/Behavioral: Negative.     Allergic/Immunologic: Negative.        Allergies   Allergen Reactions    Angiotensin Receptor Blockers Angioedema     Lip swelling         Current Outpatient Medications:     ACCU-CHEK BRANDON PLUS test strip, TEST DAILY AS DIRECTED, Disp: , Rfl: 1    ACCU-CHEK SOFTCLIX LANCETS lancets, TEST DAILY AS DIRECTED, Disp: , Rfl: 1    amoxicillin (AMOXIL) 500 MG capsule, Take 1 capsule by mouth 2 (Two) Times a Day for 10 days., Disp: 20 capsule, Rfl: 0    aspirin 81 MG tablet, Take 1 tablet by mouth Daily., Disp: 30 tablet, Rfl: 11    atorvastatin (LIPITOR) 40 MG tablet, TAKE ONE TABLET BY MOUTH EVERY NIGHT AT BEDTIME, Disp: 90 tablet, Rfl: 3    benzonatate  (TESSALON) 200 MG capsule, Take 1 capsule by mouth 2 (Two) Times a Day As Needed for Cough for up to 10 days., Disp: 20 capsule, Rfl: 0    carvedilol (COREG) 6.25 MG tablet, Take 1 tablet by mouth 2 (Two) Times a Day With Meals., Disp: 180 tablet, Rfl: 3    Cholecalciferol (VITAMIN D-3) 1000 UNITS capsule, Take 1,000 Units by mouth Daily., Disp: , Rfl:     cloNIDine (CATAPRES) 0.3 MG tablet, Take 0.5 tablets by mouth 2 (Two) Times a Day., Disp: , Rfl:     fluticasone (FLONASE) 50 MCG/ACT nasal spray, 2 sprays into the nostril(s) as directed by provider Daily for 30 days., Disp: 16 g, Rfl: 0    furosemide (LASIX) 20 MG tablet, Take 1 tablet by mouth Daily., Disp: , Rfl:     gabapentin (NEURONTIN) 400 MG capsule, Take 1 capsule by mouth 3 (Three) Times a Day., Disp: , Rfl:     glimepiride (AMARYL) 4 MG tablet, Take 1 tablet by mouth Daily., Disp: , Rfl:     losartan (COZAAR) 50 MG tablet, TAKE ONE TABLET BY MOUTH DAILY, Disp: 90 tablet, Rfl: 1    SITagliptin (JANUVIA) 100 MG tablet, Take 1 tablet by mouth Daily., Disp: , Rfl:     tamsulosin (FLOMAX) 0.4 MG capsule 24 hr capsule, Take 2 capsules by mouth Daily., Disp: , Rfl:     ULORIC 40 MG tablet, Take 1 tablet by mouth Daily., Disp: , Rfl: 3      Objective:     There were no vitals filed for this visit.  There is no height or weight on file to calculate BMI.    2D Echocardiogram 09/28/2018:  Left ventricular systolic function is normal.  The left ventricular cavity is mildly dilated.  Left ventricular diastolic dysfunction (grade I) consistent with impaired relaxation.  Left atrial cavity size is mildly dilated.  There is a prosthetic aortic valve.  Aortic valve mean pressure gradient is 6.6 mmHg.  Aortic valve maximum pressure gradient is 12.8 mmHg.  There is a mitral valve ring present.  Mild mitral valve regurgitation is present  Mild tricuspid valve regurgitation is present.  Calculated right ventricular systolic pressure from tricuspid regurgitation is 25  mmHg.    Left Heart Cath 03/03/2015:  1. Left main angiographically normal.   2. Left anterior descending artery mild luminal irregularities.   3. Left circumflex ruptured plaque in the proximal to mid segment with thrombus  and no flow into a large caliber OM-3 branch.   4. Right coronary artery, mild luminal irregularities.   5. Aspiration thrombectomy of the ostial left circumflex to proximal OM-3  branch.   6. PTCA of the 1st obtuse marginal branch with a 2.5 x 12 mm Trek Rx balloon.   7. Successful percutaneous intervention to the ostial to mid-left circumflex  coronary artery with overlapping 4 x 33 mm and 4.0 x 23 mm Xience Alpine  drug-eluting stent.     PHYSICAL EXAM:    Constitutional:       Appearance: Healthy appearance. Not in distress.   Neck:      Vascular: No JVR. JVD normal.   Pulmonary:      Effort: Pulmonary effort is normal.      Breath sounds: Normal breath sounds. No wheezing. No rhonchi. No rales.   Chest:      Chest wall: Not tender to palpatation.   Cardiovascular:      PMI at left midclavicular line. Normal rate. Regular rhythm. Normal S1. Normal S2.       Murmurs: There is no murmur.      No gallop.  No click. No rub.   Pulses:     Intact distal pulses.   Edema:     Peripheral edema absent.   Abdominal:      General: Bowel sounds are normal.      Palpations: Abdomen is soft.      Tenderness: There is no abdominal tenderness.   Musculoskeletal: Normal range of motion.         General: No tenderness. Skin:     General: Skin is warm and dry.   Neurological:      General: No focal deficit present.      Mental Status: Alert and oriented to person, place and time.           ECG 12 Lead    Date/Time: 1/15/2024 1:55 PM  Performed by: Ophelia Jarquin APRN    Authorized by: Ophelia Jarquin APRN  Comparison: compared with previous ECG from 12/2/2021  Similar to previous ECG  Rhythm: sinus rhythm  BPM: 61  T inversion: II, III, aVF and V6  QRS axis: normal  Other findings: T wave  abnormality            Assessment:       Diagnosis Plan   1. S/P AVR (aortic valve replacement)        2. S/P MVR (mitral valve repair)        3. Coronary artery disease involving native coronary artery of native heart without angina pectoris        4. Essential hypertension        5. History of ST elevation myocardial infarction (STEMI)          No orders of the defined types were placed in this encounter.         Plan:       1.  Coronary artery disease: Previous inferior lateral STEMI with PCI to left circumflex.  On aspirin, statin and beta-blocker.  No angina. EKG comparable to EKG 12/02/2021    2.  Aortic valve stenosis status post aortic valve replacement with tissue valve in 2015: Most recent echocardiogram in 2018 revealed well-functioning prosthetic aortic valve and normal gradient.  No murmur    3.  Hypertension: well controlled    4.  Mitral valve repair: Most recent echocardiogram reports mild residual mitral valve regurgitation.  Euvolmemic.     Mr. Bright will follow up with Dr. Varghese in one year.  He will call sooner for any questions or concerns.           Your medication list            Accurate as of January 15, 2024 10:03 AM. If you have any questions, ask your nurse or doctor.                CONTINUE taking these medications        Instructions Last Dose Given Next Dose Due   Accu-Chek Ibis Plus test strip  Generic drug: glucose blood      TEST DAILY AS DIRECTED       Accu-Chek Softclix Lancets lancets      TEST DAILY AS DIRECTED       amoxicillin 500 MG capsule  Commonly known as: AMOXIL      Take 1 capsule by mouth 2 (Two) Times a Day for 10 days.       aspirin 81 MG tablet      Take 1 tablet by mouth Daily.       atorvastatin 40 MG tablet  Commonly known as: LIPITOR      TAKE ONE TABLET BY MOUTH EVERY NIGHT AT BEDTIME       benzonatate 200 MG capsule  Commonly known as: TESSALON      Take 1 capsule by mouth 2 (Two) Times a Day As Needed for Cough for up to 10 days.       carvedilol 6.25 MG  tablet  Commonly known as: COREG      Take 1 tablet by mouth 2 (Two) Times a Day With Meals.       cloNIDine 0.3 MG tablet  Commonly known as: CATAPRES      Take 0.5 tablets by mouth 2 (Two) Times a Day.       fluticasone 50 MCG/ACT nasal spray  Commonly known as: FLONASE      2 sprays into the nostril(s) as directed by provider Daily for 30 days.       furosemide 20 MG tablet  Commonly known as: LASIX      Take 1 tablet by mouth Daily.       gabapentin 400 MG capsule  Commonly known as: NEURONTIN      Take 1 capsule by mouth 3 (Three) Times a Day.       glimepiride 4 MG tablet  Commonly known as: AMARYL      Take 1 tablet by mouth Daily.       losartan 50 MG tablet  Commonly known as: COZAAR      TAKE ONE TABLET BY MOUTH DAILY       SITagliptin 100 MG tablet  Commonly known as: JANUVIA      Take 1 tablet by mouth Daily.       tamsulosin 0.4 MG capsule 24 hr capsule  Commonly known as: FLOMAX      Take 2 capsules by mouth Daily.       Uloric 40 MG tablet  Generic drug: febuxostat      Take 1 tablet by mouth Daily.       Vitamin D-3 25 MCG (1000 UT) capsule      Take 1,000 Units by mouth Daily.                  As always, it has been a pleasure to participate in your patient's care.      Sincerely,       CLAIR Poole

## 2024-02-15 ENCOUNTER — TELEPHONE (OUTPATIENT)
Age: 84
End: 2024-02-15
Payer: MEDICARE

## 2024-02-15 NOTE — TELEPHONE ENCOUNTER
We received a fax from Dr. Emiliano Siddiqi's office wanting to know if the pt is clear to have a Colonoscopy and how long he can hold his Aspirin.    We saw him last on 1/15/24.   Please advise.    Thanks,  Lyric IBARRA#[phone]  F#[phone]

## 2024-02-16 NOTE — TELEPHONE ENCOUNTER
He can have a colonoscopy but secondary to his history I would recommend continuing the aspirin therapy throughout.  It is only 81 mg p.o. daily

## 2024-03-06 RX ORDER — LOSARTAN POTASSIUM 50 MG/1
50 TABLET ORAL DAILY
Qty: 90 TABLET | Refills: 4 | Status: SHIPPED | OUTPATIENT
Start: 2024-03-06

## 2024-04-12 VITALS
DIASTOLIC BLOOD PRESSURE: 69 MMHG | RESPIRATION RATE: 11 BRPM | SYSTOLIC BLOOD PRESSURE: 139 MMHG | HEART RATE: 69 BPM | RESPIRATION RATE: 12 BRPM | DIASTOLIC BLOOD PRESSURE: 61 MMHG | HEART RATE: 50 BPM | RESPIRATION RATE: 15 BRPM | OXYGEN SATURATION: 99 % | RESPIRATION RATE: 18 BRPM | HEART RATE: 51 BPM | SYSTOLIC BLOOD PRESSURE: 128 MMHG | SYSTOLIC BLOOD PRESSURE: 107 MMHG | SYSTOLIC BLOOD PRESSURE: 102 MMHG | RESPIRATION RATE: 16 BRPM | SYSTOLIC BLOOD PRESSURE: 135 MMHG | DIASTOLIC BLOOD PRESSURE: 71 MMHG | TEMPERATURE: 97.1 F | HEART RATE: 49 BPM | DIASTOLIC BLOOD PRESSURE: 54 MMHG | RESPIRATION RATE: 14 BRPM | SYSTOLIC BLOOD PRESSURE: 123 MMHG | SYSTOLIC BLOOD PRESSURE: 108 MMHG | SYSTOLIC BLOOD PRESSURE: 111 MMHG | SYSTOLIC BLOOD PRESSURE: 104 MMHG | HEART RATE: 48 BPM | HEART RATE: 52 BPM | HEART RATE: 53 BPM | OXYGEN SATURATION: 97 % | WEIGHT: 190 LBS | SYSTOLIC BLOOD PRESSURE: 118 MMHG | OXYGEN SATURATION: 100 % | DIASTOLIC BLOOD PRESSURE: 58 MMHG | DIASTOLIC BLOOD PRESSURE: 60 MMHG | TEMPERATURE: 98.4 F | HEIGHT: 72 IN

## 2024-04-16 ENCOUNTER — OFFICE (AMBULATORY)
Dept: URBAN - METROPOLITAN AREA PATHOLOGY 4 | Facility: PATHOLOGY | Age: 84
End: 2024-04-16

## 2024-04-16 ENCOUNTER — AMBULATORY SURGICAL CENTER (AMBULATORY)
Dept: URBAN - METROPOLITAN AREA SURGERY 17 | Facility: SURGERY | Age: 84
End: 2024-04-16

## 2024-04-16 DIAGNOSIS — K57.30 DIVERTICULOSIS OF LARGE INTESTINE WITHOUT PERFORATION OR ABS: ICD-10-CM

## 2024-04-16 DIAGNOSIS — D12.5 BENIGN NEOPLASM OF SIGMOID COLON: ICD-10-CM

## 2024-04-16 DIAGNOSIS — Z09 ENCOUNTER FOR FOLLOW-UP EXAMINATION AFTER COMPLETED TREATMEN: ICD-10-CM

## 2024-04-16 DIAGNOSIS — Z86.010 PERSONAL HISTORY OF COLONIC POLYPS: ICD-10-CM

## 2024-04-16 DIAGNOSIS — D12.3 BENIGN NEOPLASM OF TRANSVERSE COLON: ICD-10-CM

## 2024-04-16 LAB
GI HISTOLOGY: A. TRANSVERSE COLON/POLYP: (no result)
GI HISTOLOGY: B. SIGMOID COLON/POLYP: (no result)
GI HISTOLOGY: PDF REPORT: (no result)

## 2024-04-16 PROCEDURE — 88305 TISSUE EXAM BY PATHOLOGIST: CPT | Performed by: PATHOLOGY

## 2024-04-16 PROCEDURE — 45385 COLONOSCOPY W/LESION REMOVAL: CPT | Mod: PT | Performed by: INTERNAL MEDICINE

## 2024-04-16 NOTE — SERVICEHPINOTES
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  He is due for colonoscopy.  He has no lower GI complaints, he does have dark stool but he is on iron.  He denies constipation.  There is no blood in the bowels.  There is no abdominal pain or weight loss.  He says that work cancers on his mom's side of the family but he is not specifically aware if there were any colon cancer's.He has no upper GI complaints.  There is no reflux, dysphagia, odynophagia nausea or vomiting.  There is no melena or hematemesis.  He does not smoke or drink.  He does take a baby aspirin daily.  He is in no acute distress.  He is also status post angioplasty with stent and aortic valve replacement with a porcine valve.  He is not on anticoagulation.

## 2024-06-14 RX ORDER — FUROSEMIDE 20 MG/1
20 TABLET ORAL DAILY
Qty: 90 TABLET | Refills: 3 | Status: SHIPPED | OUTPATIENT
Start: 2024-06-14

## 2024-08-15 RX ORDER — AMOXICILLIN 500 MG/1
CAPSULE ORAL
Qty: 4 CAPSULE | Refills: 1 | Status: SHIPPED | OUTPATIENT
Start: 2024-08-15

## 2024-09-03 RX ORDER — CARVEDILOL 6.25 MG/1
6.25 TABLET ORAL 2 TIMES DAILY WITH MEALS
Qty: 180 TABLET | Refills: 2 | Status: SHIPPED | OUTPATIENT
Start: 2024-09-03

## 2024-12-20 RX ORDER — ATORVASTATIN CALCIUM 40 MG/1
40 TABLET, FILM COATED ORAL
Qty: 90 TABLET | Refills: 3 | Status: SHIPPED | OUTPATIENT
Start: 2024-12-20

## 2025-01-23 ENCOUNTER — OFFICE VISIT (OUTPATIENT)
Age: 85
End: 2025-01-23
Payer: MEDICARE

## 2025-01-23 VITALS
WEIGHT: 188 LBS | DIASTOLIC BLOOD PRESSURE: 64 MMHG | HEART RATE: 74 BPM | BODY MASS INDEX: 25.47 KG/M2 | HEIGHT: 72 IN | SYSTOLIC BLOOD PRESSURE: 108 MMHG

## 2025-01-23 DIAGNOSIS — I25.10 CORONARY ARTERY DISEASE INVOLVING NATIVE CORONARY ARTERY OF NATIVE HEART WITHOUT ANGINA PECTORIS: ICD-10-CM

## 2025-01-23 DIAGNOSIS — Z95.2 S/P AVR (AORTIC VALVE REPLACEMENT): Primary | ICD-10-CM

## 2025-01-23 DIAGNOSIS — Z98.890 S/P MVR (MITRAL VALVE REPAIR): ICD-10-CM

## 2025-01-23 DIAGNOSIS — I25.2 HISTORY OF ST ELEVATION MYOCARDIAL INFARCTION (STEMI): ICD-10-CM

## 2025-01-23 DIAGNOSIS — I10 ESSENTIAL HYPERTENSION: ICD-10-CM

## 2025-01-23 RX ORDER — ASCORBIC ACID 500 MG
500 TABLET ORAL DAILY
COMMUNITY

## 2025-01-23 NOTE — PROGRESS NOTES
Date of Office Visit: 25    Encounter Provider: Liborio Varghese MD  Place of Service: [facility] CARDIOLOGY  Patient Name: Vj Bright  :1940    Chief complaint:  Coronary artery disease with prior PCI  History of aortic valve replacement, mitral valve repair, and resection of subaortic stenosis  CKD    HPI: Vj Bright is a 84 y.o. male with a medical history of coronary artery disease, paroxysmal atrial fibrillation, chronic diastolic heart failure and aortic stenosis.  In 2015, he had a STEMI and underwent stent placement to his circumflex artery.  In 2015, he underwent aortic valve replacement, mitral valve repair and resection of subaortic stenosis.  Postoperatively, he developed some paroxysmal atrial fibrillation and was placed on amiodarone and warfarin.  He has had no recurrence of atrial fibrillation, and amiodarone and warfarin have subsequently been continued.  Echocardiogram in 2018 revealed normal left ventricular function, well functioning prosthetic aortic valve with peak and mean gradients within defined limits, well-functioning mitral valve ring with mild mitral regurgitation and mild tricuspid regurgitation.   He continues to feel well from a cardiac standpoint.  He denies any chest pain or significant issues with dyspnea on exertion.  He continues to follow with nephrology and they been monitoring his creatinine closely.    Previous testing and notes have been reviewed by me.     Past Medical History:   Diagnosis Date    Acute respiratory failure     Aortic stenosis     BPH (benign prostatic hyperplasia)     CAD (coronary artery disease)     Chest pain     CHF (congestive heart failure)     Diabetes mellitus     Gout     Hyperlipidemia     Hypertension     PAF (paroxysmal atrial fibrillation)     Pulmonary hypertension     STEMI (ST elevation myocardial infarction)        Past Surgical History:   Procedure  Laterality Date    AORTIC VALVE REPAIR/REPLACEMENT      CARDIAC SURGERY      CARDIAC SURGERY      CORONARY ANGIOPLASTY WITH STENT PLACEMENT      MITRAL VALVE REPAIR/REPLACEMENT      OTHER SURGICAL HISTORY      AORTIC VALVE REPAIR OF SUBVALVULAR AORTIC STENOSIS    OTHER SURGICAL HISTORY      CARDIAC CATH PROCEDURE OUTCOME: SUCCESSFUL    OTHER SURGICAL HISTORY      VENOUS THROMBECTOMY BY COMBINED LEG INCISION       Social History     Socioeconomic History    Marital status:    Tobacco Use    Smoking status: Never    Smokeless tobacco: Never    Tobacco comments:     CAFFEINE USE: OCC. CHOCOLATE CANDY   Vaping Use    Vaping status: Never Used   Substance and Sexual Activity    Alcohol use: Yes     Alcohol/week: 1.0 standard drink of alcohol     Types: 1 Cans of beer per week     Comment: social    Drug use: No    Sexual activity: Defer       Family History   Problem Relation Age of Onset    Hypertension Mother     Heart attack Father        Review of Systems   Constitutional: Negative. Negative for fever and malaise/fatigue.   HENT: Negative.  Negative for nosebleeds and sore throat.    Eyes: Negative.  Negative for blurred vision and double vision.   Cardiovascular: Negative.  Negative for chest pain, claudication, palpitations and syncope.   Respiratory: Negative.  Negative for cough, shortness of breath and snoring.    Endocrine: Negative.  Negative for cold intolerance, heat intolerance and polydipsia.   Hematologic/Lymphatic: Negative.    Skin: Negative.  Negative for itching, poor wound healing and rash.   Musculoskeletal: Negative.  Negative for joint pain, joint swelling, muscle weakness and myalgias.   Gastrointestinal: Negative.  Negative for abdominal pain, melena, nausea and vomiting.   Genitourinary: Negative.    Neurological: Negative.  Negative for light-headedness, loss of balance, seizures, vertigo and weakness.   Psychiatric/Behavioral: Negative.  Negative for altered mental status and  depression.    Allergic/Immunologic: Negative.        Allergies   Allergen Reactions    Angiotensin Receptor Blockers Angioedema     Lip swelling         Current Outpatient Medications:     ACCU-CHEK BRANDON PLUS test strip, TEST DAILY AS DIRECTED, Disp: , Rfl: 1    ACCU-CHEK SOFTCLIX LANCETS lancets, TEST DAILY AS DIRECTED, Disp: , Rfl: 1    amoxicillin (AMOXIL) 500 MG capsule, TAKE 4 CAPSULES BY MOUTH 1 HOUR BEFORE PROCEDURE, Disp: 4 capsule, Rfl: 1    ascorbic acid (VITAMIN C) 500 MG tablet, Take 1 tablet by mouth Daily., Disp: , Rfl:     aspirin 81 MG tablet, Take 1 tablet by mouth Daily., Disp: 30 tablet, Rfl: 11    atorvastatin (LIPITOR) 40 MG tablet, Take 1 tablet by mouth every night at bedtime., Disp: 90 tablet, Rfl: 3    carvedilol (COREG) 6.25 MG tablet, TAKE 1 TABLET BY MOUTH TWICE A DAY WITH A MEAL, Disp: 180 tablet, Rfl: 2    cetirizine (zyrTEC) 10 MG tablet, Take 1 tablet by mouth Daily., Disp: , Rfl:     Cholecalciferol (VITAMIN D-3) 1000 UNITS capsule, Take 1,000 Units by mouth Daily., Disp: , Rfl:     cloNIDine (CATAPRES) 0.3 MG tablet, Take 0.5 tablets by mouth 2 (Two) Times a Day., Disp: , Rfl:     Farxiga 10 MG tablet, Take 10 mg by mouth., Disp: , Rfl:     ferrous sulfate 325 (65 FE) MG tablet, Take 1 tablet by mouth., Disp: , Rfl:     furosemide (LASIX) 20 MG tablet, Take 1 tablet by mouth Daily., Disp: 90 tablet, Rfl: 3    gabapentin (NEURONTIN) 400 MG capsule, Take 1 capsule by mouth 3 (Three) Times a Day., Disp: , Rfl:     glimepiride (AMARYL) 4 MG tablet, Take 1 tablet by mouth Daily., Disp: , Rfl:     losartan (COZAAR) 50 MG tablet, Take 1 tablet by mouth Daily., Disp: 90 tablet, Rfl: 4    tamsulosin (FLOMAX) 0.4 MG capsule 24 hr capsule, Take 2 capsules by mouth Daily., Disp: , Rfl:     ULORIC 40 MG tablet, Take 1 tablet by mouth Daily., Disp: , Rfl: 3    fluticasone (FLONASE) 50 MCG/ACT nasal spray, 2 sprays into the nostril(s) as directed by provider Daily for 30 days., Disp: 16 g, Rfl:  "0      Objective:     Vitals:    01/23/25 1526   BP: 108/64   Pulse: 74   Weight: 85.3 kg (188 lb)   Height: 182.9 cm (72\")     Body mass index is 25.5 kg/m².    PHYSICAL EXAM:    Constitutional:       Appearance: Healthy appearance. Not in distress.   Neck:      Vascular: No JVR. JVD normal.   Pulmonary:      Effort: Pulmonary effort is normal.      Breath sounds: Normal breath sounds. No wheezing. No rhonchi. No rales.   Chest:      Chest wall: Not tender to palpatation.   Cardiovascular:      PMI at left midclavicular line. Normal rate. Regular rhythm. Normal S1. Normal S2.       Murmurs: There is no murmur.      No gallop.  No click. No rub.   Pulses:     Intact distal pulses.   Edema:     Peripheral edema absent.   Abdominal:      General: Bowel sounds are normal.      Palpations: Abdomen is soft.      Tenderness: There is no abdominal tenderness.   Musculoskeletal: Normal range of motion.         General: No tenderness. Skin:     General: Skin is warm and dry.      Comments: Sternotomy   Neurological:      General: No focal deficit present.      Mental Status: Alert and oriented to person, place and time.           ECG 12 Lead    Date/Time: 1/23/2025 3:43 PM  Performed by: Liborio Varghese MD    Authorized by: Liborio Varghese MD  Comparison: compared with previous ECG from 1/15/2024  Similar to previous ECG  Rhythm: sinus rhythm  Rate: normal  QRS axis: normal  Other findings: T wave abnormality    Clinical impression: abnormal EKG          2D Echocardiogram 09/28/2018:  Left ventricular systolic function is normal.  The left ventricular cavity is mildly dilated.  Left ventricular diastolic dysfunction (grade I) consistent with impaired relaxation.  Left atrial cavity size is mildly dilated.  There is a prosthetic aortic valve.  Aortic valve mean pressure gradient is 6.6 mmHg.  Aortic valve maximum pressure gradient is 12.8 mmHg.  There is a mitral valve ring present.  Mild mitral valve " regurgitation is present  Mild tricuspid valve regurgitation is present.  Calculated right ventricular systolic pressure from tricuspid regurgitation is 25 mmHg.    Left Heart Cath 03/03/2015:  1. Left main angiographically normal.   2. Left anterior descending artery mild luminal irregularities.   3. Left circumflex ruptured plaque in the proximal to mid segment with thrombus  and no flow into a large caliber OM-3 branch.   4. Right coronary artery, mild luminal irregularities.   5. Aspiration thrombectomy of the ostial left circumflex to proximal OM-3  branch.   6. PTCA of the 1st obtuse marginal branch with a 2.5 x 12 mm Trek Rx balloon.   7. Successful percutaneous intervention to the ostial to mid-left circumflex  coronary artery with overlapping 4 x 33 mm and 4.0 x 23 mm Xience Alpine  drug-eluting stent.       Assessment:      Plan:       1.  Coronary artery disease: Previous inferior lateral STEMI with PCI to left circumflex.     -EKG unchanged from prior.  - Continue aspirin 81 mg p.o. daily.  - Continue atorvastatin 40 mg p.o. nightly.  Tolerating well.  No myalgias.  - No change in losartan dosing.  Nephrology is monitoring creatinine.  -Continue carvedilol.  Currently on 6.25 mg p.o. every 12 hours    2.  Aortic valve stenosis status post aortic valve replacement with tissue valve in 2015.  No evidence of cardiac murmur.  Sounds great.  No indication for repeat echo.    3.  Hypertension: well controlled.  Patient will monitor for lower blood pressures.  Denies any lightheadedness.    4.  Mitral valve repair: Last echocardiogram mild residual mitral valve regurgitation.  No repeat echo indicated.    5.  CKD: Continue to follow with Dr. Garvey along with PCP.  They are monitoring closely.         Your medication list            Accurate as of January 23, 2025  3:43 PM. If you have any questions, ask your nurse or doctor.                CONTINUE taking these medications        Instructions Last Dose Given  Next Dose Due   Accu-Chek Ibis Plus test strip  Generic drug: glucose blood      TEST DAILY AS DIRECTED       Accu-Chek Softclix Lancets lancets      TEST DAILY AS DIRECTED       amoxicillin 500 MG capsule  Commonly known as: AMOXIL      TAKE 4 CAPSULES BY MOUTH 1 HOUR BEFORE PROCEDURE       ascorbic acid 500 MG tablet  Commonly known as: VITAMIN C      Take 1 tablet by mouth Daily.       aspirin 81 MG tablet      Take 1 tablet by mouth Daily.       atorvastatin 40 MG tablet  Commonly known as: LIPITOR      Take 1 tablet by mouth every night at bedtime.       carvedilol 6.25 MG tablet  Commonly known as: COREG      TAKE 1 TABLET BY MOUTH TWICE A DAY WITH A MEAL       cetirizine 10 MG tablet  Commonly known as: zyrTEC      Take 1 tablet by mouth Daily.       cloNIDine 0.3 MG tablet  Commonly known as: CATAPRES      Take 0.5 tablets by mouth 2 (Two) Times a Day.       Farxiga 10 MG tablet  Generic drug: dapagliflozin Propanediol      Take 10 mg by mouth.       ferrous sulfate 325 (65 FE) MG tablet      Take 1 tablet by mouth.       fluticasone 50 MCG/ACT nasal spray  Commonly known as: FLONASE      2 sprays into the nostril(s) as directed by provider Daily for 30 days.       furosemide 20 MG tablet  Commonly known as: LASIX      Take 1 tablet by mouth Daily.       gabapentin 400 MG capsule  Commonly known as: NEURONTIN      Take 1 capsule by mouth 3 (Three) Times a Day.       glimepiride 4 MG tablet  Commonly known as: AMARYL      Take 1 tablet by mouth Daily.       losartan 50 MG tablet  Commonly known as: COZAAR      Take 1 tablet by mouth Daily.       tamsulosin 0.4 MG capsule 24 hr capsule  Commonly known as: FLOMAX      Take 2 capsules by mouth Daily.       Uloric 40 MG tablet  Generic drug: febuxostat      Take 1 tablet by mouth Daily.       Vitamin D-3 25 MCG (1000 UT) capsule      Take 1,000 Units by mouth Daily.

## 2025-03-18 RX ORDER — AMOXICILLIN 500 MG/1
CAPSULE ORAL
Qty: 4 CAPSULE | Refills: 1 | Status: SHIPPED | OUTPATIENT
Start: 2025-03-18

## 2025-05-03 ENCOUNTER — HOSPITAL ENCOUNTER (INPATIENT)
Facility: HOSPITAL | Age: 85
LOS: 8 days | Discharge: NURSING, REHAB OR LONG-TERM CARE | DRG: 690 | End: 2025-05-11
Attending: EMERGENCY MEDICINE | Admitting: HOSPITALIST
Payer: MEDICARE

## 2025-05-03 ENCOUNTER — APPOINTMENT (OUTPATIENT)
Dept: CT IMAGING | Facility: HOSPITAL | Age: 85
DRG: 690 | End: 2025-05-03
Payer: MEDICARE

## 2025-05-03 DIAGNOSIS — N17.9 AKI (ACUTE KIDNEY INJURY): ICD-10-CM

## 2025-05-03 DIAGNOSIS — R33.8 ACUTE URINARY RETENTION: ICD-10-CM

## 2025-05-03 DIAGNOSIS — N30.80 EMPHYSEMATOUS CYSTITIS: Primary | ICD-10-CM

## 2025-05-03 LAB
ANION GAP SERPL CALCULATED.3IONS-SCNC: 14 MMOL/L (ref 5–15)
BACTERIA UR QL AUTO: ABNORMAL /HPF
BASOPHILS # BLD AUTO: 0.03 10*3/MM3 (ref 0–0.2)
BASOPHILS NFR BLD AUTO: 0.3 % (ref 0–1.5)
BILIRUB UR QL STRIP: NEGATIVE
BUN SERPL-MCNC: 46 MG/DL (ref 8–23)
BUN/CREAT SERPL: 19.7 (ref 7–25)
CALCIUM SPEC-SCNC: 9.2 MG/DL (ref 8.6–10.5)
CHLORIDE SERPL-SCNC: 98 MMOL/L (ref 98–107)
CLARITY UR: ABNORMAL
CO2 SERPL-SCNC: 24 MMOL/L (ref 22–29)
COLOR UR: ABNORMAL
CREAT SERPL-MCNC: 2.33 MG/DL (ref 0.76–1.27)
DEPRECATED RDW RBC AUTO: 44 FL (ref 37–54)
EGFRCR SERPLBLD CKD-EPI 2021: 26.7 ML/MIN/1.73
EOSINOPHIL # BLD AUTO: 0.12 10*3/MM3 (ref 0–0.4)
EOSINOPHIL NFR BLD AUTO: 1.3 % (ref 0.3–6.2)
ERYTHROCYTE [DISTWIDTH] IN BLOOD BY AUTOMATED COUNT: 12.4 % (ref 12.3–15.4)
GLUCOSE SERPL-MCNC: 152 MG/DL (ref 65–99)
GLUCOSE UR STRIP-MCNC: ABNORMAL MG/DL
HCT VFR BLD AUTO: 38.3 % (ref 37.5–51)
HGB BLD-MCNC: 12.6 G/DL (ref 13–17.7)
HGB UR QL STRIP.AUTO: ABNORMAL
HYALINE CASTS UR QL AUTO: ABNORMAL /LPF
IMM GRANULOCYTES # BLD AUTO: 0.06 10*3/MM3 (ref 0–0.05)
IMM GRANULOCYTES NFR BLD AUTO: 0.6 % (ref 0–0.5)
KETONES UR QL STRIP: NEGATIVE
LEUKOCYTE ESTERASE UR QL STRIP.AUTO: ABNORMAL
LYMPHOCYTES # BLD AUTO: 1.29 10*3/MM3 (ref 0.7–3.1)
LYMPHOCYTES NFR BLD AUTO: 13.9 % (ref 19.6–45.3)
MCH RBC QN AUTO: 32 PG (ref 26.6–33)
MCHC RBC AUTO-ENTMCNC: 32.9 G/DL (ref 31.5–35.7)
MCV RBC AUTO: 97.2 FL (ref 79–97)
MONOCYTES # BLD AUTO: 0.87 10*3/MM3 (ref 0.1–0.9)
MONOCYTES NFR BLD AUTO: 9.4 % (ref 5–12)
NEUTROPHILS NFR BLD AUTO: 6.9 10*3/MM3 (ref 1.7–7)
NEUTROPHILS NFR BLD AUTO: 74.5 % (ref 42.7–76)
NITRITE UR QL STRIP: POSITIVE
NRBC BLD AUTO-RTO: 0 /100 WBC (ref 0–0.2)
PH UR STRIP.AUTO: <=5 [PH] (ref 5–8)
PLATELET # BLD AUTO: 160 10*3/MM3 (ref 140–450)
PMV BLD AUTO: 9.6 FL (ref 6–12)
POTASSIUM SERPL-SCNC: 4.8 MMOL/L (ref 3.5–5.2)
PROT UR QL STRIP: ABNORMAL
RBC # BLD AUTO: 3.94 10*6/MM3 (ref 4.14–5.8)
RBC # UR STRIP: ABNORMAL /HPF
REF LAB TEST METHOD: ABNORMAL
SODIUM SERPL-SCNC: 136 MMOL/L (ref 136–145)
SP GR UR STRIP: 1.01 (ref 1–1.03)
SQUAMOUS #/AREA URNS HPF: ABNORMAL /HPF
UROBILINOGEN UR QL STRIP: ABNORMAL
WBC # UR STRIP: ABNORMAL /HPF
WBC NRBC COR # BLD AUTO: 9.27 10*3/MM3 (ref 3.4–10.8)

## 2025-05-03 PROCEDURE — 87086 URINE CULTURE/COLONY COUNT: CPT | Performed by: EMERGENCY MEDICINE

## 2025-05-03 PROCEDURE — 87077 CULTURE AEROBIC IDENTIFY: CPT | Performed by: EMERGENCY MEDICINE

## 2025-05-03 PROCEDURE — 25810000003 SODIUM CHLORIDE 0.9 % SOLUTION: Performed by: HOSPITALIST

## 2025-05-03 PROCEDURE — 81001 URINALYSIS AUTO W/SCOPE: CPT | Performed by: EMERGENCY MEDICINE

## 2025-05-03 PROCEDURE — 85025 COMPLETE CBC W/AUTO DIFF WBC: CPT | Performed by: EMERGENCY MEDICINE

## 2025-05-03 PROCEDURE — 25810000003 LACTATED RINGERS SOLUTION: Performed by: EMERGENCY MEDICINE

## 2025-05-03 PROCEDURE — 25010000002 CEFTRIAXONE PER 250 MG: Performed by: EMERGENCY MEDICINE

## 2025-05-03 PROCEDURE — 87186 SC STD MICRODIL/AGAR DIL: CPT | Performed by: EMERGENCY MEDICINE

## 2025-05-03 PROCEDURE — 51798 US URINE CAPACITY MEASURE: CPT

## 2025-05-03 PROCEDURE — 51702 INSERT TEMP BLADDER CATH: CPT

## 2025-05-03 PROCEDURE — 74176 CT ABD & PELVIS W/O CONTRAST: CPT

## 2025-05-03 PROCEDURE — 80048 BASIC METABOLIC PNL TOTAL CA: CPT | Performed by: EMERGENCY MEDICINE

## 2025-05-03 PROCEDURE — 99285 EMERGENCY DEPT VISIT HI MDM: CPT

## 2025-05-03 RX ORDER — ATORVASTATIN CALCIUM 20 MG/1
40 TABLET, FILM COATED ORAL NIGHTLY
Status: DISCONTINUED | OUTPATIENT
Start: 2025-05-03 | End: 2025-05-10

## 2025-05-03 RX ORDER — FAMOTIDINE 20 MG/1
20 TABLET, FILM COATED ORAL 2 TIMES DAILY
Status: DISCONTINUED | OUTPATIENT
Start: 2025-05-03 | End: 2025-05-11 | Stop reason: HOSPADM

## 2025-05-03 RX ORDER — SODIUM CHLORIDE 0.9 % (FLUSH) 0.9 %
10 SYRINGE (ML) INJECTION AS NEEDED
Status: DISCONTINUED | OUTPATIENT
Start: 2025-05-03 | End: 2025-05-11 | Stop reason: HOSPADM

## 2025-05-03 RX ORDER — CARVEDILOL 6.25 MG/1
6.25 TABLET ORAL 2 TIMES DAILY WITH MEALS
Status: DISCONTINUED | OUTPATIENT
Start: 2025-05-03 | End: 2025-05-11 | Stop reason: HOSPADM

## 2025-05-03 RX ORDER — CLONIDINE HYDROCHLORIDE 0.1 MG/1
0.1 TABLET ORAL EVERY 8 HOURS SCHEDULED
Status: DISCONTINUED | OUTPATIENT
Start: 2025-05-03 | End: 2025-05-11 | Stop reason: HOSPADM

## 2025-05-03 RX ORDER — ASPIRIN 81 MG/1
81 TABLET, CHEWABLE ORAL DAILY
Status: DISCONTINUED | OUTPATIENT
Start: 2025-05-04 | End: 2025-05-04

## 2025-05-03 RX ORDER — SODIUM CHLORIDE 9 MG/ML
75 INJECTION, SOLUTION INTRAVENOUS CONTINUOUS
Status: DISCONTINUED | OUTPATIENT
Start: 2025-05-03 | End: 2025-05-06

## 2025-05-03 RX ORDER — LIDOCAINE HYDROCHLORIDE 20 MG/ML
JELLY TOPICAL ONCE
Status: COMPLETED | OUTPATIENT
Start: 2025-05-03 | End: 2025-05-03

## 2025-05-03 RX ADMIN — CLONIDINE HYDROCHLORIDE 0.1 MG: 0.1 TABLET ORAL at 21:50

## 2025-05-03 RX ADMIN — CARVEDILOL 6.25 MG: 6.25 TABLET, FILM COATED ORAL at 21:50

## 2025-05-03 RX ADMIN — FAMOTIDINE 20 MG: 20 TABLET, FILM COATED ORAL at 21:50

## 2025-05-03 RX ADMIN — ATORVASTATIN CALCIUM 40 MG: 20 TABLET, FILM COATED ORAL at 21:50

## 2025-05-03 RX ADMIN — SODIUM CHLORIDE, POTASSIUM CHLORIDE, SODIUM LACTATE AND CALCIUM CHLORIDE 1000 ML: 600; 310; 30; 20 INJECTION, SOLUTION INTRAVENOUS at 18:30

## 2025-05-03 RX ADMIN — LIDOCAINE HYDROCHLORIDE: 20 JELLY TOPICAL at 18:18

## 2025-05-03 RX ADMIN — CEFTRIAXONE SODIUM 1000 MG: 1 INJECTION, POWDER, FOR SOLUTION INTRAMUSCULAR; INTRAVENOUS at 18:09

## 2025-05-03 RX ADMIN — SODIUM CHLORIDE 75 ML/HR: 9 INJECTION, SOLUTION INTRAVENOUS at 21:38

## 2025-05-03 NOTE — ED PROVIDER NOTES
EMERGENCY DEPARTMENT ENCOUNTER    Room Number:  29/29  PCP: Nany Real MD    HPI:  Chief Complaint: Dysuria  A complete HPI/ROS/PMH/PSH/SH/FH are unobtainable due to: None  Context: Vj Bright is a 84 y.o. male who presents to the ED c/o acute dysuria for the past 1 week.  He reports having mild lower abdominal pain.  No flank pain.  No fever or vomiting.        PAST MEDICAL HISTORY  Active Ambulatory Problems     Diagnosis Date Noted    S/P AVR (aortic valve replacement) 01/21/2016    S/P MVR (mitral valve repair) 01/21/2016    Coronary artery disease involving native coronary artery of native heart without angina pectoris 07/13/2017    Essential hypertension 01/18/2018    Arthritis of left knee 04/16/2015    Disorder of aorta 08/14/2019    Knee effusion 04/21/2015    Knee hemarthrosis, left 09/29/2015    Knee pain, left 04/21/2015    Paroxysmal atrial fibrillation 08/14/2019    ST elevation myocardial infarction (STEMI) 08/14/2019    History of ST elevation myocardial infarction (STEMI) 01/15/2024     Resolved Ambulatory Problems     Diagnosis Date Noted    No Resolved Ambulatory Problems     Past Medical History:   Diagnosis Date    Acute respiratory failure     Aortic stenosis     BPH (benign prostatic hyperplasia)     CAD (coronary artery disease)     Chest pain     CHF (congestive heart failure)     Diabetes mellitus     Gout     Hyperlipidemia     Hypertension     PAF (paroxysmal atrial fibrillation)     Pulmonary hypertension     STEMI (ST elevation myocardial infarction)          PAST SURGICAL HISTORY  Past Surgical History:   Procedure Laterality Date    AORTIC VALVE REPAIR/REPLACEMENT      CARDIAC SURGERY      CARDIAC SURGERY      CORONARY ANGIOPLASTY WITH STENT PLACEMENT      MITRAL VALVE REPAIR/REPLACEMENT      OTHER SURGICAL HISTORY      AORTIC VALVE REPAIR OF SUBVALVULAR AORTIC STENOSIS    OTHER SURGICAL HISTORY      CARDIAC CATH PROCEDURE OUTCOME: SUCCESSFUL    OTHER SURGICAL HISTORY       VENOUS THROMBECTOMY BY COMBINED LEG INCISION         FAMILY HISTORY  Family History   Problem Relation Age of Onset    Hypertension Mother     Heart attack Father          SOCIAL HISTORY  Social History     Socioeconomic History    Marital status:    Tobacco Use    Smoking status: Never    Smokeless tobacco: Never    Tobacco comments:     CAFFEINE USE: OCC. CHOCOLATE CANDY   Vaping Use    Vaping status: Never Used   Substance and Sexual Activity    Alcohol use: Yes     Alcohol/week: 1.0 standard drink of alcohol     Types: 1 Cans of beer per week     Comment: social    Drug use: No    Sexual activity: Defer         ALLERGIES  Angiotensin receptor blockers        REVIEW OF SYSTEMS  Review of Systems     Included in HPI  All systems reviewed and negative except for those discussed in HPI.       PHYSICAL EXAM  ED Triage Vitals [05/03/25 1528]   Temp Heart Rate Resp BP SpO2   96.3 °F (35.7 °C) 95 16 145/90 97 %      Temp src Heart Rate Source Patient Position BP Location FiO2 (%)   Tympanic Monitor -- -- --       Physical Exam      GENERAL: no acute distress  HENT: nares patent  EYES: no scleral icterus  CV: regular rhythm, normal rate  RESPIRATORY: normal effort  ABDOMEN: soft, suprapubic tenderness, no rebound or guarding  MUSCULOSKELETAL: no deformity  NEURO: alert, moves all extremities, follows commands  PSYCH:  calm, cooperative  SKIN: warm, dry    Vital signs and nursing notes reviewed.          LAB RESULTS  Recent Results (from the past 24 hours)   POCT URINALYSIS DIPSTICK, AUTOMATED    Collection Time: 05/03/25  2:44 PM    Specimen: Urine    Specimen type and source: Urine, Urine specimen (specimen)   Result Value Ref Range    Color, UA Orange     Appearance, Fluid Cloudy (A) Clear, Slightly Cloudy    Specific Gravity, UA 1.015 1.000 - 1.030    pH, UA 5 5 - 8    Leukocytes,  Mauricio/ul (A) Negative    Nitrite, UA Positive (A) Negative    Total Protein, urine 100 mg/dL (A) Negative, Trace     Glucose, UA >1000 mg/dL (A) Normal    External Ketones, Urine Negative Negative    Urobilinogen, UA 1 mg/dL (A) Normal mg/dL    External Bilirubin, Urine 1 mg/dL (A) Negative    Blood,  Cyrus/uL (A) Negative    QC Acceptable Acceptable    Lot Number [id number]     Expiration Date 01/31/2026     Comment     POCT GLUCOSE FINGERSTICK    Collection Time: 05/03/25  2:56 PM    Specimen type and source: Blood specimen from patient (specimen)   Result Value Ref Range    Glucose 67 (A) 70 - 99 mg/dL    QC Yes     Lot Number [id number]     Expiration Date 10/30/2025     Comment     Urinalysis With Microscopic If Indicated (No Culture) - Urine, Clean Catch    Collection Time: 05/03/25  3:41 PM    Specimen: Urine, Clean Catch   Result Value Ref Range    Color, UA Orange (A) Yellow, Straw    Appearance, UA Turbid (A) Clear    pH, UA <=5.0 5.0 - 8.0    Specific Gravity, UA 1.014 1.005 - 1.030    Glucose,  mg/dL (2+) (A) Negative    Ketones, UA Negative Negative    Bilirubin, UA Negative Negative    Blood, UA Large (3+) (A) Negative    Protein,  mg/dL (2+) (A) Negative    Leuk Esterase, UA Large (3+) (A) Negative    Nitrite, UA Positive (A) Negative    Urobilinogen, UA 0.2 E.U./dL 0.2 - 1.0 E.U./dL   Urinalysis, Microscopic Only - Urine, Clean Catch    Collection Time: 05/03/25  3:41 PM    Specimen: Urine, Clean Catch   Result Value Ref Range    RBC, UA Too Numerous to Count (A) None Seen, 0-2 /HPF    WBC, UA Too Numerous to Count (A) None Seen, 0-2 /HPF    Bacteria, UA 4+ (A) None Seen /HPF    Squamous Epithelial Cells, UA 0-2 None Seen, 0-2 /HPF    Hyaline Casts, UA 0-2 None Seen /LPF    Methodology Automated Microscopy    Basic Metabolic Panel    Collection Time: 05/03/25  5:08 PM    Specimen: Blood   Result Value Ref Range    Glucose 152 (H) 65 - 99 mg/dL    BUN 46 (H) 8 - 23 mg/dL    Creatinine 2.33 (H) 0.76 - 1.27 mg/dL    Sodium 136 136 - 145 mmol/L    Potassium 4.8 3.5 - 5.2 mmol/L    Chloride 98 98 - 107  mmol/L    CO2 24.0 22.0 - 29.0 mmol/L    Calcium 9.2 8.6 - 10.5 mg/dL    BUN/Creatinine Ratio 19.7 7.0 - 25.0    Anion Gap 14.0 5.0 - 15.0 mmol/L    eGFR 26.7 (L) >60.0 mL/min/1.73   CBC Auto Differential    Collection Time: 05/03/25  5:08 PM    Specimen: Blood   Result Value Ref Range    WBC 9.27 3.40 - 10.80 10*3/mm3    RBC 3.94 (L) 4.14 - 5.80 10*6/mm3    Hemoglobin 12.6 (L) 13.0 - 17.7 g/dL    Hematocrit 38.3 37.5 - 51.0 %    MCV 97.2 (H) 79.0 - 97.0 fL    MCH 32.0 26.6 - 33.0 pg    MCHC 32.9 31.5 - 35.7 g/dL    RDW 12.4 12.3 - 15.4 %    RDW-SD 44.0 37.0 - 54.0 fl    MPV 9.6 6.0 - 12.0 fL    Platelets 160 140 - 450 10*3/mm3    Neutrophil % 74.5 42.7 - 76.0 %    Lymphocyte % 13.9 (L) 19.6 - 45.3 %    Monocyte % 9.4 5.0 - 12.0 %    Eosinophil % 1.3 0.3 - 6.2 %    Basophil % 0.3 0.0 - 1.5 %    Immature Grans % 0.6 (H) 0.0 - 0.5 %    Neutrophils, Absolute 6.90 1.70 - 7.00 10*3/mm3    Lymphocytes, Absolute 1.29 0.70 - 3.10 10*3/mm3    Monocytes, Absolute 0.87 0.10 - 0.90 10*3/mm3    Eosinophils, Absolute 0.12 0.00 - 0.40 10*3/mm3    Basophils, Absolute 0.03 0.00 - 0.20 10*3/mm3    Immature Grans, Absolute 0.06 (H) 0.00 - 0.05 10*3/mm3    nRBC 0.0 0.0 - 0.2 /100 WBC       Ordered the above labs and reviewed the results.        RADIOLOGY  CT Abdomen Pelvis Without Contrast  Result Date: 5/3/2025  CT ABDOMEN AND PELVIS WITHOUT CONTRAST:  HISTORY: uti, eval for stone; N30.90-Cystitis, unspecified without hematuria  TECHNIQUE: Helical CT was performed of the abdomen and pelvis from the lung bases through the lesser trochanters without IV contrast. Reformatted images were provided in the coronal and sagittal planes. Radiation dose reduction techniques were utilized, including automated exposure control, and exposure modulation based on body size.  COMPARISON: CT abdomen/pelvis 1/20/2020  FINDINGS:  Lung bases: Visualized lung bases are unremarkable.  Abdomen: The liver and gallbladder are normal.  The spleen and pancreas  appear normal.  Both adrenal glands are normal. There is no evidence of bowel obstruction. There is no evidence of free intraperitoneal air or abnormal intra-abdominal fluid collection, adenopathy or other mass. The aorta is normal in caliber.   There is mild bilateral hydronephrosis. There is no evidence of nephrolithiasis or ureterolithiasis on either side. The urinary bladder is distended and there is wall thickening with pneumatosis.  Pelvis: There is no pelvic adenopathy or soft tissue mass. In addition to urinary bladder pneumatosis, there appears to be air in a few adjacent pelvic veins. There is no CT evidence of hernia or bowel obstruction.  There are spinal degenerative changes, but there is no acute bony abnormality.       Emphysematous cystitis, urinary bladder distention with pneumatosis of the bladder wall, as well as involving a few small pelvic veins adjacent to the bladder base, and there is mild bilateral secondary hydronephrosis.  CALL REPORT :  Results of the exam were discussed with Dr. Lee, the physician caring for the patient in the emergency room at the time of this dictation.     This report was finalized on 5/3/2025 5:51 PM by Dr. Bassem Haynes M.D on Workstation: [id number]        Ordered the above noted radiological studies. Reviewed by me in PACS.        MEDICATIONS GIVEN IN ER  Medications   sodium chloride 0.9 % flush 10 mL (has no administration in time range)   lactated ringers bolus 1,000 mL (has no administration in time range)   Lidocaine HCl gel (XYLOCAINE) urethral/mucosal syringe (has no administration in time range)   cefTRIAXone (ROCEPHIN) 1,000 mg in sodium chloride 0.9 % 100 mL MBP (has no administration in time range)         ORDERS PLACED DURING THIS VISIT:  Orders Placed This Encounter   Procedures    Urine Culture - Urine, Urine, Clean Catch    CT Abdomen Pelvis Without Contrast    Urinalysis With Microscopic If Indicated (No Culture) - Urine, Clean Catch     Urinalysis, Microscopic Only - Urine, Clean Catch    Basic Metabolic Panel    CBC Auto Differential    Bladder scan    Post void residual  Misc Nursing Order (Specify)    Insert Indwelling Urinary Catheter    Assess Need for Indwelling Urinary Catheter - Follow Removal Protocol    Urinary Catheter Care    Insert Peripheral IV    Inpatient Admission    CBC & Differential         OUTPATIENT MEDICATION MANAGEMENT:  Current Facility-Administered Medications Ordered in Epic   Medication Dose Route Frequency Provider Last Rate Last Admin    cefTRIAXone (ROCEPHIN) 1,000 mg in sodium chloride 0.9 % 100 mL MBP  1,000 mg Intravenous Once Bassem Lee II, MD        lactated ringers bolus 1,000 mL  1,000 mL Intravenous Once Bassem Lee II, MD        Lidocaine HCl gel (XYLOCAINE) urethral/mucosal syringe   Topical Once Bassem Lee II, MD        sodium chloride 0.9 % flush 10 mL  10 mL Intravenous PRN Bassem Lee II, MD         Current Outpatient Medications Ordered in Epic   Medication Sig Dispense Refill    ACCU-CHEK BRANDON PLUS test strip TEST DAILY AS DIRECTED  1    ACCU-CHEK SOFTCLIX LANCETS lancets TEST DAILY AS DIRECTED  1    amoxicillin (AMOXIL) 500 MG capsule TAKE 4 CAPSULES BY MOUTH 1 HOUR BEFORE PROCEDURE 4 capsule 1    ascorbic acid (VITAMIN C) 500 MG tablet Take 1 tablet by mouth Daily.      aspirin 81 MG tablet Take 1 tablet by mouth Daily. 30 tablet 11    atorvastatin (LIPITOR) 40 MG tablet Take 1 tablet by mouth every night at bedtime. 90 tablet 3    carvedilol (COREG) 6.25 MG tablet TAKE 1 TABLET BY MOUTH TWICE A DAY WITH A MEAL 180 tablet 2    cetirizine (zyrTEC) 10 MG tablet Take 1 tablet by mouth Daily.      Cholecalciferol (VITAMIN D-3) 1000 UNITS capsule Take 1,000 Units by mouth Daily.      cloNIDine (CATAPRES) 0.3 MG tablet Take 0.5 tablets by mouth 2 (Two) Times a Day.      Farxiga 10 MG tablet Take 10 mg by mouth.      ferrous sulfate 325 (65 FE) MG tablet Take 1 tablet  by mouth.      fluticasone (FLONASE) 50 MCG/ACT nasal spray 2 sprays into the nostril(s) as directed by provider Daily for 30 days. 16 g 0    furosemide (LASIX) 20 MG tablet Take 1 tablet by mouth Daily. 90 tablet 3    gabapentin (NEURONTIN) 400 MG capsule Take 1 capsule by mouth 3 (Three) Times a Day.      glimepiride (AMARYL) 4 MG tablet Take 1 tablet by mouth Daily.      losartan (COZAAR) 50 MG tablet Take 1 tablet by mouth Daily. 90 tablet 4    tamsulosin (FLOMAX) 0.4 MG capsule 24 hr capsule Take 2 capsules by mouth Daily.      ULORIC 40 MG tablet Take 1 tablet by mouth Daily.  3       PROCEDURES  Procedures          MEDICAL DECISION MAKING, PROGRESS, and CONSULTS    Discussion below represents my analysis of pertinent findings related to patient's condition, differential diagnosis, treatment plan and final disposition.      Additional sources:      - External (non-ED) record review: Reviewed records from urgent care at [facility] from earlier today            Differential diagnosis:    Differential diagnosis includes but not limited to:  - hepatobiliary pathology such as cholecystitis, cholangitis, and symptomatic cholelithiasis  - Pancreatitis  - Dyspepsia  - Small bowel obstruction  - Appendicitis  - Diverticulitis  - UTI including pyelonephritis  - Ureteral stone  - Zoster  - Colitis, including infectious and ischemic  - Atypical ACS               Independent interpretation of labs, radiology studies, and discussions with consultants:  ED Course as of 05/03/25 1756   Sat May 03, 2025   1600 Leukocytes, UA(!): Large (3+) [TD]   1600 Nitrite, UA(!): Positive [TD]   1609 Medical chart review, reviewed urgent care's notes from earlier today.  Patient was complaining of dysuria.  Onset of symptoms over the last several days.    Patient diagnosed with UTI.  Given a dose of ceftriaxone.  He was sent to the ER for ultrasound to rule out obstructive urinary process.   [TD]   1625 WBC, UA(!): Too Numerous to  Count [TD]   1625 Bacteria, UA(!): 4+ [TD]   1730 CT of the abdomen and pelvis independently interpreted by myself.  Distended urinary bladder with associated emphysematous change to the bladder wall [TD]   1748 Creatinine(!): 2.33 [TD]   1748 Sodium: 136 [TD]   1748 Potassium: 4.8 [TD]   1755 I discussed the case with Dr. Flores, hospitalist.  He will admit. [TD]   1755 Although patient received 1 g of Rocephin IM earlier in the day, given this is emphysematous cystitis, I will give him additional 1 g of Rocephin. [TD]      ED Course User Index  [TD] Bassem Lee II, MD           Clinical Scores:                                   DIAGNOSIS  Final diagnoses:   Emphysematous cystitis   Acute urinary retention   JUDY (acute kidney injury)         DISPOSITION  Admit      Latest Documented Vital Signs:  As of 17:56 EDT  BP- 170/93 HR- 74 Temp- 96.3 °F (35.7 °C) (Tympanic) O2 sat- 96%      --    Please note that portions of this were completed with a voice recognition program.       Note Disclaimer: At [facility], we believe that sharing information builds trust and better relationships. You are receiving this note because you are receiving care at [facility] or recently visited. It is possible you will see health information before a provider has talked with you about it. This kind of information can be easy to misunderstand. To help you fully understand what it means for your health, we urge you to discuss this note with your provider.         Bassem Lee II, MD  05/03/25 4432

## 2025-05-03 NOTE — ED NOTES
Nursing report ED to floor  Vj Bright  84 y.o.  male    HPI :  HPI  Stated Reason for Visit: urinary retention, last void just prior to arrival. Patient received rocephin injection at urgent care  History Obtained From: patient    Chief Complaint  Chief Complaint   Patient presents with    Difficulty Urinating       Admitting doctor:   Ochoa Flores MD    Admitting diagnosis:   The primary encounter diagnosis was Emphysematous cystitis. Diagnoses of Acute urinary retention and JUDY (acute kidney injury) were also pertinent to this visit.    Code status:   Current Code Status       Date Active Code Status Order ID Comments User Context       Not on file            Allergies:   Angiotensin receptor blockers    Isolation:   No active isolations    Intake and Output  No intake or output data in the 24 hours ending 05/03/25 1809    Weight:   There were no vitals filed for this visit.    Most recent vitals:   Vitals:    05/03/25 1528 05/03/25 1600 05/03/25 1731   BP: 145/90 137/72 170/93   Pulse: 95 76 74   Resp: 16     Temp: 96.3 °F (35.7 °C)     TempSrc: Tympanic     SpO2: 97% 97% 96%       Active LDAs/IV Access:   Lines, Drains & Airways       Active LDAs       Name Placement date Placement time Site Days    Peripheral IV 05/03/25 1720 22 G Anterior;Proximal;Right Forearm 05/03/25  1720  Forearm  less than 1                    Labs (abnormal labs have a star):   Labs Reviewed   URINALYSIS W/ MICROSCOPIC IF INDICATED (NO CULTURE) - Abnormal; Notable for the following components:       Result Value    Color, UA Orange (*)     Appearance, UA Turbid (*)     Glucose,  mg/dL (2+) (*)     Blood, UA Large (3+) (*)     Protein,  mg/dL (2+) (*)     Leuk Esterase, UA Large (3+) (*)     Nitrite, UA Positive (*)     All other components within normal limits   URINALYSIS, MICROSCOPIC ONLY - Abnormal; Notable for the following components:    RBC, UA Too Numerous to Count (*)     WBC, UA Too Numerous to Count (*)      Bacteria, UA 4+ (*)     All other components within normal limits   BASIC METABOLIC PANEL - Abnormal; Notable for the following components:    Glucose 152 (*)     BUN 46 (*)     Creatinine 2.33 (*)     eGFR 26.7 (*)     All other components within normal limits    Narrative:     GFR Categories in Chronic Kidney Disease (CKD)              GFR Category          GFR (mL/min/1.73)    Interpretation  G1                    90 or greater        Normal or high (1)  G2                    60-89                Mild decrease (1)  G3a                   45-59                Mild to moderate decrease  G3b                   30-44                Moderate to severe decrease  G4                    15-29                Severe decrease  G5                    14 or less           Kidney failure    (1)In the absence of evidence of kidney disease, neither GFR category G1 or G2 fulfill the criteria for CKD.    eGFR calculation 2021 CKD-EPI creatinine equation, which does not include race as a factor   CBC WITH AUTO DIFFERENTIAL - Abnormal; Notable for the following components:    RBC 3.94 (*)     Hemoglobin 12.6 (*)     MCV 97.2 (*)     Lymphocyte % 13.9 (*)     Immature Grans % 0.6 (*)     Immature Grans, Absolute 0.06 (*)     All other components within normal limits   URINE CULTURE   CBC AND DIFFERENTIAL    Narrative:     The following orders were created for panel order CBC & Differential.  Procedure                               Abnormality         Status                     ---------                               -----------         ------                     CBC Auto Differential[[id number]]        Abnormal            Final result                 Please view results for these tests on the individual orders.       EKG:   No orders to display       Meds given in ED:   Medications   sodium chloride 0.9 % flush 10 mL (has no administration in time range)   lactated ringers bolus 1,000 mL (has no administration in time range)   Lidocaine  HCl gel (XYLOCAINE) urethral/mucosal syringe (has no administration in time range)   cefTRIAXone (ROCEPHIN) 1,000 mg in sodium chloride 0.9 % 100 mL MBP (has no administration in time range)       Imaging results:  CT Abdomen Pelvis Without Contrast  Result Date: 5/3/2025   Emphysematous cystitis, urinary bladder distention with pneumatosis of the bladder wall, as well as involving a few small pelvic veins adjacent to the bladder base, and there is mild bilateral secondary hydronephrosis.  CALL REPORT :  Results of the exam were discussed with Dr. Lee, the physician caring for the patient in the emergency room at the time of this dictation.     This report was finalized on 5/3/2025 5:51 PM by Dr. Bassem Haynes M.D on Workstation: [id number]        Ambulatory status:   - x1    Social issues:   Social History     Socioeconomic History    Marital status:    Tobacco Use    Smoking status: Never    Smokeless tobacco: Never    Tobacco comments:     CAFFEINE USE: OCC. CHOCOLATE CANDY   Vaping Use    Vaping status: Never Used   Substance and Sexual Activity    Alcohol use: Yes     Alcohol/week: 1.0 standard drink of alcohol     Types: 1 Cans of beer per week     Comment: social    Drug use: No    Sexual activity: Defer       Peripheral Neurovascular  Peripheral Neurovascular (Adult)  Peripheral Neurovascular WDL: WDL    Neuro Cognitive  Neuro Cognitive (Adult)  Cognitive/Neuro/Behavioral WDL: WDL    Learning  Learning Assessment  Learning Readiness and Ability: no barriers identified    Respiratory  Respiratory  Airway WDL: WDL  Respiratory WDL  Respiratory WDL: WDL    Abdominal Pain       Pain Assessments  Pain (Adult)  (0-10) Pain Rating: Rest: 10  Pain Location: urethral meatus    NIH Stroke Scale       Liborio Rios RN  05/03/25 18:09 EDT

## 2025-05-04 LAB
ANION GAP SERPL CALCULATED.3IONS-SCNC: 10 MMOL/L (ref 5–15)
BASOPHILS # BLD AUTO: 0.04 10*3/MM3 (ref 0–0.2)
BASOPHILS NFR BLD AUTO: 0.5 % (ref 0–1.5)
BUN SERPL-MCNC: 39 MG/DL (ref 8–23)
BUN/CREAT SERPL: 23.8 (ref 7–25)
CALCIUM SPEC-SCNC: 8.2 MG/DL (ref 8.6–10.5)
CHLORIDE SERPL-SCNC: 104 MMOL/L (ref 98–107)
CO2 SERPL-SCNC: 23 MMOL/L (ref 22–29)
CREAT SERPL-MCNC: 1.64 MG/DL (ref 0.76–1.27)
DEPRECATED RDW RBC AUTO: 43.7 FL (ref 37–54)
EGFRCR SERPLBLD CKD-EPI 2021: 40.7 ML/MIN/1.73
EOSINOPHIL # BLD AUTO: 0.11 10*3/MM3 (ref 0–0.4)
EOSINOPHIL NFR BLD AUTO: 1.3 % (ref 0.3–6.2)
ERYTHROCYTE [DISTWIDTH] IN BLOOD BY AUTOMATED COUNT: 12.6 % (ref 12.3–15.4)
GLUCOSE BLDC GLUCOMTR-MCNC: 153 MG/DL (ref 70–130)
GLUCOSE BLDC GLUCOMTR-MCNC: 212 MG/DL (ref 70–130)
GLUCOSE SERPL-MCNC: 60 MG/DL (ref 65–99)
HCT VFR BLD AUTO: 33.9 % (ref 37.5–51)
HGB BLD-MCNC: 11.3 G/DL (ref 13–17.7)
IMM GRANULOCYTES # BLD AUTO: 0.06 10*3/MM3 (ref 0–0.05)
IMM GRANULOCYTES NFR BLD AUTO: 0.7 % (ref 0–0.5)
LYMPHOCYTES # BLD AUTO: 1.6 10*3/MM3 (ref 0.7–3.1)
LYMPHOCYTES NFR BLD AUTO: 18.8 % (ref 19.6–45.3)
MCH RBC QN AUTO: 31.7 PG (ref 26.6–33)
MCHC RBC AUTO-ENTMCNC: 33.3 G/DL (ref 31.5–35.7)
MCV RBC AUTO: 95.2 FL (ref 79–97)
MONOCYTES # BLD AUTO: 0.99 10*3/MM3 (ref 0.1–0.9)
MONOCYTES NFR BLD AUTO: 11.6 % (ref 5–12)
NEUTROPHILS NFR BLD AUTO: 5.73 10*3/MM3 (ref 1.7–7)
NEUTROPHILS NFR BLD AUTO: 67.1 % (ref 42.7–76)
NRBC BLD AUTO-RTO: 0 /100 WBC (ref 0–0.2)
PLATELET # BLD AUTO: 174 10*3/MM3 (ref 140–450)
PMV BLD AUTO: 9.6 FL (ref 6–12)
POTASSIUM SERPL-SCNC: 4.7 MMOL/L (ref 3.5–5.2)
RBC # BLD AUTO: 3.56 10*6/MM3 (ref 4.14–5.8)
SODIUM SERPL-SCNC: 137 MMOL/L (ref 136–145)
WBC NRBC COR # BLD AUTO: 8.53 10*3/MM3 (ref 3.4–10.8)

## 2025-05-04 PROCEDURE — 80048 BASIC METABOLIC PNL TOTAL CA: CPT | Performed by: HOSPITALIST

## 2025-05-04 PROCEDURE — 25010000002 CEFTRIAXONE PER 250 MG: Performed by: HOSPITALIST

## 2025-05-04 PROCEDURE — 82948 REAGENT STRIP/BLOOD GLUCOSE: CPT

## 2025-05-04 PROCEDURE — 85025 COMPLETE CBC W/AUTO DIFF WBC: CPT | Performed by: HOSPITALIST

## 2025-05-04 PROCEDURE — 63710000001 INSULIN LISPRO (HUMAN) PER 5 UNITS: Performed by: HOSPITALIST

## 2025-05-04 RX ORDER — ASCORBIC ACID 500 MG
500 TABLET ORAL DAILY
Status: DISCONTINUED | OUTPATIENT
Start: 2025-05-04 | End: 2025-05-11 | Stop reason: HOSPADM

## 2025-05-04 RX ORDER — INSULIN LISPRO 100 [IU]/ML
2-7 INJECTION, SOLUTION INTRAVENOUS; SUBCUTANEOUS
Status: DISCONTINUED | OUTPATIENT
Start: 2025-05-04 | End: 2025-05-11 | Stop reason: HOSPADM

## 2025-05-04 RX ORDER — IBUPROFEN 600 MG/1
1 TABLET ORAL
Status: DISCONTINUED | OUTPATIENT
Start: 2025-05-04 | End: 2025-05-11

## 2025-05-04 RX ORDER — FERROUS SULFATE 325(65) MG
325 TABLET ORAL
Status: DISCONTINUED | OUTPATIENT
Start: 2025-05-04 | End: 2025-05-04

## 2025-05-04 RX ORDER — NICOTINE POLACRILEX 4 MG
15 LOZENGE BUCCAL
Status: DISCONTINUED | OUTPATIENT
Start: 2025-05-04 | End: 2025-05-11

## 2025-05-04 RX ORDER — CHOLECALCIFEROL (VITAMIN D3) 25 MCG
TABLET ORAL DAILY
Status: DISCONTINUED | OUTPATIENT
Start: 2025-05-04 | End: 2025-05-11 | Stop reason: HOSPADM

## 2025-05-04 RX ORDER — TAMSULOSIN HYDROCHLORIDE 0.4 MG/1
0.8 CAPSULE ORAL DAILY
Status: DISCONTINUED | OUTPATIENT
Start: 2025-05-04 | End: 2025-05-11 | Stop reason: HOSPADM

## 2025-05-04 RX ORDER — FLUTICASONE PROPIONATE 50 MCG
2 SPRAY, SUSPENSION (ML) NASAL DAILY
Status: DISCONTINUED | OUTPATIENT
Start: 2025-05-04 | End: 2025-05-11 | Stop reason: HOSPADM

## 2025-05-04 RX ORDER — GABAPENTIN 400 MG/1
400 CAPSULE ORAL 3 TIMES DAILY
Status: DISCONTINUED | OUTPATIENT
Start: 2025-05-04 | End: 2025-05-11 | Stop reason: HOSPADM

## 2025-05-04 RX ORDER — LIDOCAINE HYDROCHLORIDE 20 MG/ML
JELLY TOPICAL ONCE
Status: DISCONTINUED | OUTPATIENT
Start: 2025-05-04 | End: 2025-05-04

## 2025-05-04 RX ORDER — DEXTROSE MONOHYDRATE 25 G/50ML
25 INJECTION, SOLUTION INTRAVENOUS
Status: DISCONTINUED | OUTPATIENT
Start: 2025-05-04 | End: 2025-05-11

## 2025-05-04 RX ORDER — ASPIRIN 81 MG/1
81 TABLET, CHEWABLE ORAL DAILY
Status: DISCONTINUED | OUTPATIENT
Start: 2025-05-05 | End: 2025-05-04

## 2025-05-04 RX ORDER — FEBUXOSTAT 40 MG/1
40 TABLET, FILM COATED ORAL DAILY
Status: DISCONTINUED | OUTPATIENT
Start: 2025-05-04 | End: 2025-05-05

## 2025-05-04 RX ADMIN — OXYCODONE HYDROCHLORIDE AND ACETAMINOPHEN 500 MG: 500 TABLET ORAL at 12:29

## 2025-05-04 RX ADMIN — FAMOTIDINE 20 MG: 20 TABLET, FILM COATED ORAL at 08:59

## 2025-05-04 RX ADMIN — CLONIDINE HYDROCHLORIDE 0.1 MG: 0.1 TABLET ORAL at 08:59

## 2025-05-04 RX ADMIN — GABAPENTIN 400 MG: 400 CAPSULE ORAL at 22:57

## 2025-05-04 RX ADMIN — CARVEDILOL 6.25 MG: 6.25 TABLET, FILM COATED ORAL at 17:11

## 2025-05-04 RX ADMIN — Medication 1000 UNITS: at 12:29

## 2025-05-04 RX ADMIN — GABAPENTIN 400 MG: 400 CAPSULE ORAL at 12:30

## 2025-05-04 RX ADMIN — CARVEDILOL 6.25 MG: 6.25 TABLET, FILM COATED ORAL at 08:59

## 2025-05-04 RX ADMIN — TAMSULOSIN HYDROCHLORIDE 0.8 MG: 0.4 CAPSULE ORAL at 12:29

## 2025-05-04 RX ADMIN — SODIUM CHLORIDE 1000 MG: 9 INJECTION, SOLUTION INTRAVENOUS at 22:57

## 2025-05-04 RX ADMIN — INSULIN LISPRO 3 UNITS: 100 INJECTION, SOLUTION INTRAVENOUS; SUBCUTANEOUS at 17:11

## 2025-05-04 RX ADMIN — INSULIN LISPRO 2 UNITS: 100 INJECTION, SOLUTION INTRAVENOUS; SUBCUTANEOUS at 22:57

## 2025-05-04 RX ADMIN — ATORVASTATIN CALCIUM 40 MG: 20 TABLET, FILM COATED ORAL at 22:57

## 2025-05-04 RX ADMIN — CLONIDINE HYDROCHLORIDE 0.1 MG: 0.1 TABLET ORAL at 22:57

## 2025-05-04 RX ADMIN — CLONIDINE HYDROCHLORIDE 0.1 MG: 0.1 TABLET ORAL at 13:36

## 2025-05-04 RX ADMIN — FEBUXOSTAT 40 MG: 40 TABLET, FILM COATED ORAL at 12:30

## 2025-05-04 NOTE — CONSULTS
[facility] Consult Note      Patient Name: Vj Bright  : 1940  MRN: [id number]  Primary Care Physician:  Nany Real MD  Referring Physician: No ref. provider found  Date of admission: 5/3/2025    Subjective     Reason for Consult:  Acute kidney injury on chronic kidney disease stage IIIb    HPI:   Vj Bright is a 84 y.o. male who presented to this facility yesterday from urgent care with complaints of dysuria and decreased urination. Patient has a history of BPH and is followed by Dr. Stapleton with [facility] as an outpatient. Patient recently underwent cystoscopy for hematuria 1 week ago and has been experiencing symptoms since that time.  UA showed UTI, confirmed on CT with emphysematous cystitis and mild bilateral hydro. Additional pertinent past medical history includes chronic kidney disease stage IIIb with baseline creatinine of 1.4-1.8 mg/dl who follows with Dr. SHUN Garvey in our office as an outpatient, history of aortic stenosis, CAD, diabetes mellitus type II, hyperlipidemia, history of gout, CHF, hypertension, hyperlipidemia and atrial fibrillation.     Upon presentation to this hospital patient was noted to have creatinine of 2.33 mg/dl which is significantly higher than his previous baseline (last creatinine 1.43 mg/dl on 25). CT and urine results as above. Patient had an indwelling lopez catheter placed yesterday with 3.5L recorded output. Serum creatinine today improved significantly down to 1.63 mg/dl. He is currently being treated with rocephin for UTI. Urology is also following. We were consulted to help manage patient's acute kidney injury on chronic kidney disease stage IIIb.     Review of Systems:   14 point review of systems is otherwise negative except for mentioned above on HPI    Personal History     Past Medical History:   Diagnosis Date    Acute respiratory failure     Aortic stenosis     BPH (benign prostatic hyperplasia)      CAD (coronary artery disease)     Chest pain     CHF (congestive heart failure)     Diabetes mellitus     Gout     Hyperlipidemia     Hypertension     PAF (paroxysmal atrial fibrillation)     Pulmonary hypertension     STEMI (ST elevation myocardial infarction)        Past Surgical History:   Procedure Laterality Date    AORTIC VALVE REPAIR/REPLACEMENT      CARDIAC SURGERY      CARDIAC SURGERY      CORONARY ANGIOPLASTY WITH STENT PLACEMENT      MITRAL VALVE REPAIR/REPLACEMENT      OTHER SURGICAL HISTORY      AORTIC VALVE REPAIR OF SUBVALVULAR AORTIC STENOSIS    OTHER SURGICAL HISTORY      CARDIAC CATH PROCEDURE OUTCOME: SUCCESSFUL    OTHER SURGICAL HISTORY      VENOUS THROMBECTOMY BY COMBINED LEG INCISION       Family History: family history includes Heart attack in his father; Hypertension in his mother.    Social History:  reports that he has never smoked. He has never used smokeless tobacco. He reports current alcohol use of about 1.0 standard drink of alcohol per week. He reports that he does not use drugs.    Home Medications:  Prior to Admission medications    Medication Sig Start Date End Date Taking? Authorizing Provider   ascorbic acid (VITAMIN C) 500 MG tablet Take 1 tablet by mouth Daily.   Yes Milton Mcghee MD   aspirin 81 MG tablet Take 1 tablet by mouth Daily. 8/14/19  Yes Vika Chaudhry APRN   atorvastatin (LIPITOR) 40 MG tablet Take 1 tablet by mouth every night at bedtime. 12/20/24  Yes Liborio Varghese MD   carvedilol (COREG) 6.25 MG tablet TAKE 1 TABLET BY MOUTH TWICE A DAY WITH A MEAL 9/3/24  Yes Liborio Varghese MD   Cholecalciferol (VITAMIN D-3) 1000 UNITS capsule Take 1,000 Units by mouth Daily. 6/2/15  Yes Milton Mcghee MD   cloNIDine (CATAPRES) 0.3 MG tablet Take 0.5 tablets by mouth 2 (Two) Times a Day. 6/20/22  Yes Milton Mcghee MD   Farxiga 10 MG tablet Take 10 mg by mouth. 10/9/23  Yes Milton Mcghee MD   ferrous sulfate 325 (65 FE) MG  tablet Take 1 tablet by mouth. 1/10/24  Yes Milton Mcghee MD   furosemide (LASIX) 20 MG tablet Take 1 tablet by mouth Daily. 6/14/24  Yes Liborio Varghese MD   gabapentin (NEURONTIN) 400 MG capsule Take 1 capsule by mouth 3 (Three) Times a Day. 5/4/22  Yes Milton Mcghee MD   glimepiride (AMARYL) 4 MG tablet Take 1 tablet by mouth Daily. 2/3/22  Yes Milton Mcghee MD   losartan (COZAAR) 50 MG tablet Take 1 tablet by mouth Daily. 3/6/24  Yes Liborio Varghese MD   tamsulosin (FLOMAX) 0.4 MG capsule 24 hr capsule Take 2 capsules by mouth Daily. 5/4/15  Yes Milton Mcghee MD   ULORIC 40 MG tablet Take 1 tablet by mouth Daily. 6/29/17  Yes Milton Mcghee MD   ACCU-CHECHRISTINA BRANDON PLUS test strip TEST DAILY AS DIRECTED 12/3/15   Milton Mcghee MD   ACCU-CHECHRISTINA SOFTCLIX LANCETS lancets TEST DAILY AS DIRECTED 12/4/15   Milton Mcghee MD   cetirizine (zyrTEC) 10 MG tablet Take 1 tablet by mouth Daily. 12/5/23   Milton Mcghee MD   fluticasone (FLONASE) 50 MCG/ACT nasal spray 2 sprays into the nostril(s) as directed by provider Daily for 30 days. 1/7/24 2/6/24  Cosmo Ho MD       Allergies:  Allergies   Allergen Reactions    Angiotensin Receptor Blockers Angioedema     Lip swelling       Objective     Vitals:   Temp:  [96.3 °F (35.7 °C)-99.3 °F (37.4 °C)] 98 °F (36.7 °C)  Heart Rate:  [69-99] 69  Resp:  [16] 16  BP: (108-170)/(63-93) 109/66    Intake/Output Summary (Last 24 hours) at 5/4/2025 1401  Last data filed at 5/4/2025 1120  Gross per 24 hour   Intake 480 ml   Output 3800 ml   Net -3320 ml       Physical Exam:   Constitutional: Awake, alert, no acute distress.  Chronically  HEENT: Sclera anicteric, no conjunctival injection  Neck: No JVD  Respiratory: Clear to auscultation bilaterally, nonlabored respiration  Cardiovascular: RRR, no murmurs, no rubs or gallops, no carotid bruit  Gastrointestinal: Positive bowel sounds, abdomen is soft, nontender and  nondistended  : No palpable bladder, Lopez catheter anchored  Musculoskeletal: No edema, no clubbing or cyanosis  Psychiatric: Appropriate affect, cooperative  Neurologic: Oriented x3, moving all extremities, normal speech and mental status  Skin: Warm and dry       Scheduled Meds:     ascorbic acid, 500 mg, Oral, Daily  atorvastatin, 40 mg, Oral, Nightly  carvedilol, 6.25 mg, Oral, BID With Meals  cefTRIAXone, 1,000 mg, Intravenous, Q24H  cholecalciferol, , Oral, Daily  cloNIDine, 0.1 mg, Oral, Q8H  famotidine, 20 mg, Oral, BID  febuxostat, 40 mg, Oral, Daily  fluticasone, 2 spray, Nasal, Daily  gabapentin, 400 mg, Oral, TID  insulin lispro, 2-7 Units, Subcutaneous, 4x Daily AC & at Bedtime  tamsulosin, 0.8 mg, Oral, Daily      IV Meds:   sodium chloride, 75 mL/hr, Last Rate: 75 mL/hr (05/03/25 2138)        Results Reviewed:   I have personally reviewed the results from the time of this admission to 5/4/2025 14:01 EDT     Lab Results   Component Value Date    GLUCOSE 60 (L) 05/04/2025    CALCIUM 8.2 (L) 05/04/2025     05/04/2025    K 4.7 05/04/2025    CO2 23.0 05/04/2025     05/04/2025    BUN 39 (H) 05/04/2025    CREATININE 1.64 (H) 05/04/2025    BCR 23.8 05/04/2025    ANIONGAP 10.0 05/04/2025      Lab Results   Component Value Date    MG 2.1 06/12/2015    PHOS 4.2 06/17/2015    ALBUMIN 2.7 (L) 06/17/2015           Assessment / Plan       Emphysematous cystitis      ASSESSMENT:  Acute kidney injury- Secondary to acute urinary obstruction. CT abd/pelvis without revealed emphysematous cystitis and mild bilateral hydro. Renal function improved with placement of lopez catheter from 2.3 on admission, now down to 1.64 mg/dl today. Electrolytes are acceptable. UA positive for infection.  Back to his baseline  Chronic kidney disease stage IIIb- Etiology secondary to hypertensive nephrosclerosis, diabetic nephropathy and chronic bladder outlet obstruction from BPH. Followed by Dr. SHUN Garvey with out group  as an outpatient. Baseline creatinine fluctuates between 1.4-1.8 mg/dl.   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 Currently being treated with ceftriaxone. Will defer to PCP and urology.   BPH- on BID flomax as an outpatient. Followed by Urology.   Hypertensive chronic kidney disease- BP is soft today. On losartan as an outpatient which is being held due to JUDY. Currently being treated with clonidine and carvedilol.   Type II diabetes mellitus- Per primary team. Patient is on Farxiga as an outpatient. Currently being held due to JDUY. Would recommend continuing to hold until infection is cleared given increased risk for  infections with SGLT-2i medications.   Anemia in chronic kidney disease- complicated by acute blood loss from the bladder. Hgb 11.3 g/dl today.     PLAN:  Agree with present treatment.   Surveillance labs.     I reviewed the chart and other providers notes, reviewed labs  I discussed the case at length with the patient he voiced with understanding.    Thank you for involving us in the care of Vj Bright.  Please feel free to call with any questions.    Stuart Arana MD  05/04/25  14:01 EDT    [facility]  [phone]      Please note that portions of this note were completed with a voice recognition program.

## 2025-05-04 NOTE — H&P
History and physical    Primary care physician      Chief complaint  Dysuria    History of present illness  84-year-old -American male with history of aortic stenosis coronary artery disease congestive heart failure diabetes hypertension hyperlipidemia atrial fibrillation pulmonary hypertension and chronic kidney disease presented to [facility] emergency room with dysuria for last 1 week which is getting worse.  Patient also have lower abdominal pain with nausea but no vomiting diarrhea.  Patient workup in ER revealed emphysematous cystitis with acute kidney injury and urine retention with bilateral hydronephrosis admitted for management.  Patient has no chest pain shortness of breath palpitation.    PAST MEDICAL HISTORY   Acute respiratory failure      Aortic stenosis      BPH (benign prostatic hyperplasia)      CAD (coronary artery disease)      Chest pain      CHF (congestive heart failure)      Diabetes mellitus      Gout      Hyperlipidemia      Hypertension      PAF (paroxysmal atrial fibrillation)      Pulmonary hypertension      STEMI (ST elevation myocardial infarction)        PAST SURGICAL HISTORY              Procedure Laterality Date    AORTIC VALVE REPAIR/REPLACEMENT        CARDIAC SURGERY        CARDIAC SURGERY        CORONARY ANGIOPLASTY WITH STENT PLACEMENT        MITRAL VALVE REPAIR/REPLACEMENT        OTHER SURGICAL HISTORY         AORTIC VALVE REPAIR OF SUBVALVULAR AORTIC STENOSIS    OTHER SURGICAL HISTORY         CARDIAC CATH PROCEDURE OUTCOME: SUCCESSFUL    OTHER SURGICAL HISTORY         VENOUS THROMBECTOMY BY COMBINED LEG INCISION         FAMILY HISTORY           Problem Relation Age of Onset    Hypertension Mother      Heart attack Father        SOCIAL HISTORY                 Socioeconomic History    Marital status:    Tobacco Use    Smoking status: Never    Smokeless tobacco: Never    Tobacco comments:       CAFFEINE USE: OCC. CHOCOLATE CANDY   Vaping Use     "Vaping status: Never Used   Substance and Sexual Activity    Alcohol use: Yes       Alcohol/week: 1.0 standard drink of alcohol       Types: 1 Cans of beer per week       Comment: social    Drug use: No    Sexual activity: Defer         ALLERGIES  Angiotensin receptor blockers  Home medications reviewed     REVIEW OF SYSTEMS  All systems reviewed and negative except for those discussed in HPI.      PHYSICAL EXAM  Blood pressure 109/66, pulse 69, temperature 98 °F (36.7 °C), temperature source Axillary, resp. rate 16, height 182.9 cm (72\"), weight 80.9 kg (178 lb 5.6 oz), SpO2 97%.    GENERAL: Awake and alert no acute distress  HENT: nares patent  EYES: no scleral icterus  CV: regular rhythm, normal rate  RESPIRATORY: normal effort and moving air bilaterally  ABDOMEN: soft, suprapubic tenderness, bowel sounds positive  MUSCULOSKELETAL: no deformity  NEURO: alert, moves all extremities, follows commands  PSYCH:  calm, cooperative  SKIN: warm, dry     LAB RESULTS  Lab Results (last 24 hours)       Procedure Component Value Units Date/Time    Urine Culture - Urine, Urine, Clean Catch [[id number]]  (Abnormal) Collected: 05/03/25 1541    Specimen: Urine, Clean Catch Updated: 05/04/25 1050     Urine Culture >100,000 CFU/mL Gram Negative Bacilli    Narrative:      Colonization of the urinary tract without infection is common. Treatment is discouraged unless the patient is symptomatic, pregnant, or undergoing an invasive urologic procedure.    Basic Metabolic Panel [[id number]]  (Abnormal) Collected: 05/04/25 0531    Specimen: Blood Updated: 05/04/25 0737     Glucose 60 mg/dL      BUN 39 mg/dL      Creatinine 1.64 mg/dL      Sodium 137 mmol/L      Potassium 4.7 mmol/L      Comment: Slight hemolysis detected by analyzer. Result may be falsely elevated.        Chloride 104 mmol/L      CO2 23.0 mmol/L      Calcium 8.2 mg/dL      BUN/Creatinine Ratio 23.8     Anion Gap 10.0 mmol/L      eGFR 40.7 mL/min/1.73     Narrative:      GFR " Categories in Chronic Kidney Disease (CKD)              GFR Category          GFR (mL/min/1.73)    Interpretation  G1                    90 or greater        Normal or high (1)  G2                    60-89                Mild decrease (1)  G3a                   45-59                Mild to moderate decrease  G3b                   30-44                Moderate to severe decrease  G4                    15-29                Severe decrease  G5                    14 or less           Kidney failure    (1)In the absence of evidence of kidney disease, neither GFR category G1 or G2 fulfill the criteria for CKD.    eGFR calculation 2021 CKD-EPI creatinine equation, which does not include race as a factor    CBC & Differential [[id number]]  (Abnormal) Collected: 05/04/25 0531    Specimen: Blood Updated: 05/04/25 0717    Narrative:      The following orders were created for panel order CBC & Differential.  Procedure                               Abnormality         Status                     ---------                               -----------         ------                     CBC Auto Differential[[id number]]        Abnormal            Final result                 Please view results for these tests on the individual orders.    CBC Auto Differential [[id number]]  (Abnormal) Collected: 05/04/25 0531    Specimen: Blood Updated: 05/04/25 0717     WBC 8.53 10*3/mm3      RBC 3.56 10*6/mm3      Hemoglobin 11.3 g/dL      Hematocrit 33.9 %      MCV 95.2 fL      MCH 31.7 pg      MCHC 33.3 g/dL      RDW 12.6 %      RDW-SD 43.7 fl      MPV 9.6 fL      Platelets 174 10*3/mm3      Neutrophil % 67.1 %      Lymphocyte % 18.8 %      Monocyte % 11.6 %      Eosinophil % 1.3 %      Basophil % 0.5 %      Immature Grans % 0.7 %      Neutrophils, Absolute 5.73 10*3/mm3      Lymphocytes, Absolute 1.60 10*3/mm3      Monocytes, Absolute 0.99 10*3/mm3      Eosinophils, Absolute 0.11 10*3/mm3      Basophils, Absolute 0.04 10*3/mm3      Immature Grans,  Absolute 0.06 10*3/mm3      nRBC 0.0 /100 WBC     Basic Metabolic Panel [[id number]]  (Abnormal) Collected: 05/03/25 1708    Specimen: Blood Updated: 05/03/25 1742     Glucose 152 mg/dL      BUN 46 mg/dL      Creatinine 2.33 mg/dL      Sodium 136 mmol/L      Potassium 4.8 mmol/L      Chloride 98 mmol/L      CO2 24.0 mmol/L      Calcium 9.2 mg/dL      BUN/Creatinine Ratio 19.7     Anion Gap 14.0 mmol/L      eGFR 26.7 mL/min/1.73     Narrative:      GFR Categories in Chronic Kidney Disease (CKD)              GFR Category          GFR (mL/min/1.73)    Interpretation  G1                    90 or greater        Normal or high (1)  G2                    60-89                Mild decrease (1)  G3a                   45-59                Mild to moderate decrease  G3b                   30-44                Moderate to severe decrease  G4                    15-29                Severe decrease  G5                    14 or less           Kidney failure    (1)In the absence of evidence of kidney disease, neither GFR category G1 or G2 fulfill the criteria for CKD.    eGFR calculation 2021 CKD-EPI creatinine equation, which does not include race as a factor    CBC & Differential [[id number]]  (Abnormal) Collected: 05/03/25 1708    Specimen: Blood Updated: 05/03/25 1721    Narrative:      The following orders were created for panel order CBC & Differential.  Procedure                               Abnormality         Status                     ---------                               -----------         ------                     CBC Auto Differential[[id number]]        Abnormal            Final result                 Please view results for these tests on the individual orders.    CBC Auto Differential [[id number]]  (Abnormal) Collected: 05/03/25 1708    Specimen: Blood Updated: 05/03/25 1721     WBC 9.27 10*3/mm3      RBC 3.94 10*6/mm3      Hemoglobin 12.6 g/dL      Hematocrit 38.3 %      MCV 97.2 fL      MCH 32.0 pg      MCHC 32.9  g/dL      RDW 12.4 %      RDW-SD 44.0 fl      MPV 9.6 fL      Platelets 160 10*3/mm3      Neutrophil % 74.5 %      Lymphocyte % 13.9 %      Monocyte % 9.4 %      Eosinophil % 1.3 %      Basophil % 0.3 %      Immature Grans % 0.6 %      Neutrophils, Absolute 6.90 10*3/mm3      Lymphocytes, Absolute 1.29 10*3/mm3      Monocytes, Absolute 0.87 10*3/mm3      Eosinophils, Absolute 0.12 10*3/mm3      Basophils, Absolute 0.03 10*3/mm3      Immature Grans, Absolute 0.06 10*3/mm3      nRBC 0.0 /100 WBC     Urinalysis, Microscopic Only - Urine, Clean Catch [[id number]]  (Abnormal) Collected: 05/03/25 1541    Specimen: Urine, Clean Catch Updated: 05/03/25 1619     RBC, UA Too Numerous to Count /HPF      WBC, UA Too Numerous to Count /HPF      Bacteria, UA 4+ /HPF      Squamous Epithelial Cells, UA 0-2 /HPF      Hyaline Casts, UA 0-2 /LPF      Methodology Automated Microscopy    Urinalysis With Microscopic If Indicated (No Culture) - Urine, Clean Catch [[id number]]  (Abnormal) Collected: 05/03/25 1541    Specimen: Urine, Clean Catch Updated: 05/03/25 1559     Color, UA Orange     Comment: Any Substance that causes an abnormal urine color can alter the accuracy of the chemical reactions.        Appearance, UA Turbid     pH, UA <=5.0     Specific Gravity, UA 1.014     Glucose,  mg/dL (2+)     Ketones, UA Negative     Bilirubin, UA Negative     Blood, UA Large (3+)     Protein,  mg/dL (2+)     Leuk Esterase, UA Large (3+)     Nitrite, UA Positive     Urobilinogen, UA 0.2 E.U./dL          Imaging Results (Last 24 Hours)       Procedure Component Value Units Date/Time    CT Abdomen Pelvis Without Contrast [[id number]] Collected: 05/03/25 1737     Updated: 05/03/25 1754    Narrative:      CT ABDOMEN AND PELVIS WITHOUT CONTRAST:     HISTORY: uti, eval for stone; N30.90-Cystitis, unspecified without  hematuria     TECHNIQUE: Helical CT was performed of the abdomen and pelvis from the  lung bases through the lesser trochanters  without IV contrast.  Reformatted images were provided in the coronal and sagittal planes.  Radiation dose reduction techniques were utilized, including automated  exposure control, and exposure modulation based on body size.     COMPARISON: CT abdomen/pelvis 1/20/2020     FINDINGS:     Lung bases: Visualized lung bases are unremarkable.     Abdomen: The liver and gallbladder are normal.  The spleen and pancreas  appear normal.  Both adrenal glands are normal. There is no evidence of  bowel obstruction. There is no evidence of free intraperitoneal air or  abnormal intra-abdominal fluid collection, adenopathy or other mass. The  aorta is normal in caliber.       There is mild bilateral hydronephrosis. There is no evidence of  nephrolithiasis or ureterolithiasis on either side. The urinary bladder  is distended and there is wall thickening with pneumatosis.     Pelvis: There is no pelvic adenopathy or soft tissue mass. In addition  to urinary bladder pneumatosis, there appears to be air in a few  adjacent pelvic veins. There is no CT evidence of hernia or bowel  obstruction.     There are spinal degenerative changes, but there is no acute bony  abnormality.       Impression:         Emphysematous cystitis, urinary bladder distention with pneumatosis of  the bladder wall, as well as involving a few small pelvic veins adjacent  to the bladder base, and there is mild bilateral secondary  hydronephrosis.     CALL REPORT :  Results of the exam were discussed with Dr. Lee, the  physician caring for the patient in the emergency room at the time of  this dictation.              This report was finalized on 5/3/2025 5:51 PM by Dr. Bassem Haynes M.D  on Workstation: [id number]               Current Facility-Administered Medications:     ascorbic acid (VITAMIN C) tablet 500 mg, 500 mg, Oral, Daily, Ochoa Flores MD, 500 mg at 05/04/25 1229    atorvastatin (LIPITOR) tablet 40 mg, 40 mg, Oral, Nightly, Ochoa Flores MD, 40  mg at 05/03/25 2150    carvedilol (COREG) tablet 6.25 mg, 6.25 mg, Oral, BID With Meals, Ochoa Flores MD, 6.25 mg at 05/04/25 0859    cefTRIAXone (ROCEPHIN) 1,000 mg in sodium chloride 0.9 % 100 mL MBP, 1,000 mg, Intravenous, Q24H, Ochoa Flores MD    cholecalciferol (VITAMIN D3) tablet, , Oral, Daily, Ochoa Flores MD, 1,000 Units at 05/04/25 1229    cloNIDine (CATAPRES) tablet 0.1 mg, 0.1 mg, Oral, Q8H, Ochoa Flores MD, 0.1 mg at 05/04/25 0859    dextrose (D50W) (25 g/50 mL) IV injection 25 g, 25 g, Intravenous, Q15 Min PRN, Ochoa Flores MD    dextrose (GLUTOSE) oral gel 15 g, 15 g, Oral, Q15 Min PRN, Ochoa Flores MD    famotidine (PEPCID) tablet 20 mg, 20 mg, Oral, BID, Ochoa Flores MD, 20 mg at 05/04/25 0859    febuxostat (ULORIC) tablet 40 mg, 40 mg, Oral, Daily, Ochoa Flores MD, 40 mg at 05/04/25 1230    fluticasone (FLONASE) 50 MCG/ACT nasal spray 2 spray, 2 spray, Nasal, Daily, Ochoa Flores MD    gabapentin (NEURONTIN) capsule 400 mg, 400 mg, Oral, TID, Ochoa Flores MD, 400 mg at 05/04/25 1230    glucagon (GLUCAGEN) injection 1 mg, 1 mg, Intramuscular, Q15 Min PRN, Ochoa Flores MD    insulin lispro (HUMALOG/ADMELOG) injection 2-7 Units, 2-7 Units, Subcutaneous, 4x Daily AC & at Bedtime, Ochoa Flores MD    Insert Peripheral IV, , , Once **AND** sodium chloride 0.9 % flush 10 mL, 10 mL, Intravenous, PRN, Ochoa Flores MD    sodium chloride 0.9 % infusion, 75 mL/hr, Intravenous, Continuous, Ochoa Flores MD, Last Rate: 75 mL/hr at 05/03/25 2138, 75 mL/hr at 05/03/25 2138    tamsulosin (FLOMAX) 24 hr capsule 0.8 mg, 0.8 mg, Oral, Daily, Ochoa Flores MD, 0.8 mg at 05/04/25 1229     ASSESSMENT  Emphysematous cystitis  Urinary retention  Bilateral hydronephrosis  Acute kidney injury  Paroxysmal atrial fibrillation  Chronic diastolic congestive heart failure  Aortic stenosis status post aortic valve replacement  Diabetes mellitus  Hypertension  Hyperlipidemia  BPH  Neuropathy  Chronic kidney disease stage  III    PLAN  Admit  Morrell catheter placed  IVF  IV antibiotics  Hold diuretics Cozaar and Amaryl  Nephrology consult  Urology to follow patient  Adjust home medications  Stress ulcer DVT prophylaxis  Supportive care  PT OT  Patient is full code  Discussed with nursing staff and nephrology  Follow closely further recommendation according to hospital course    OLGA LIDIA LOPEZ MD

## 2025-05-04 NOTE — CONSULTS
FIRST UROLOGY CONSULT      Patient Identification:  NAME:  Vj Bright  Age:  84 y.o.   Sex:  male   :  1940   MRN:  [id number]     Chief complaint: emphysematous cystitis     History of present illness:      Pt is a 84 y.o. male with a history of BPH in which he is followed by Dr. Stapleton  and takes Flomax BID, that presented to the ED on 2025 for dysuria from the [facility]. 1 week ago he had a cysto for hematuria w/u that was negative and since then has dysuria.  UA showed UTI, confirmed on CT with emphysematous cystitis and mild bilateral hydro. He denies any gross hematuria, n/v/f/c since cysto or now. He also denies any stream changes.  He has been started on Ceftriaxone.       In hospital:  -AVSS, 400mL UOP  -WBC - 8.53 (9.27, 7.6)  -H/H- 11.3/33.9 (12.6/38.3)   -Creat - 2.33  -UA - turbid, + blood, + LE, +nit   -UCx - pending     Asked to see    Past medical history:  Past Medical History:   Diagnosis Date    Acute respiratory failure     Aortic stenosis     BPH (benign prostatic hyperplasia)     CAD (coronary artery disease)     Chest pain     CHF (congestive heart failure)     Diabetes mellitus     Gout     Hyperlipidemia     Hypertension     PAF (paroxysmal atrial fibrillation)     Pulmonary hypertension     STEMI (ST elevation myocardial infarction)        Past surgical history:  Past Surgical History:   Procedure Laterality Date    AORTIC VALVE REPAIR/REPLACEMENT      CARDIAC SURGERY      CARDIAC SURGERY      CORONARY ANGIOPLASTY WITH STENT PLACEMENT      MITRAL VALVE REPAIR/REPLACEMENT      OTHER SURGICAL HISTORY      AORTIC VALVE REPAIR OF SUBVALVULAR AORTIC STENOSIS    OTHER SURGICAL HISTORY      CARDIAC CATH PROCEDURE OUTCOME: SUCCESSFUL    OTHER SURGICAL HISTORY      VENOUS THROMBECTOMY BY COMBINED LEG INCISION       Allergies:  Angiotensin receptor blockers    Home medications:  Medications Prior to Admission   Medication Sig Dispense Refill Last Dose/Taking    ascorbic acid  (VITAMIN C) 500 MG tablet Take 1 tablet by mouth Daily.   5/3/2025    aspirin 81 MG tablet Take 1 tablet by mouth Daily. 30 tablet 11 5/3/2025    atorvastatin (LIPITOR) 40 MG tablet Take 1 tablet by mouth every night at bedtime. 90 tablet 3 5/2/2025    carvedilol (COREG) 6.25 MG tablet TAKE 1 TABLET BY MOUTH TWICE A DAY WITH A MEAL 180 tablet 2 5/3/2025    Cholecalciferol (VITAMIN D-3) 1000 UNITS capsule Take 1,000 Units by mouth Daily.   5/3/2025    cloNIDine (CATAPRES) 0.3 MG tablet Take 0.5 tablets by mouth 2 (Two) Times a Day.   5/3/2025    Farxiga 10 MG tablet Take 10 mg by mouth.   5/3/2025    ferrous sulfate 325 (65 FE) MG tablet Take 1 tablet by mouth.   5/3/2025    furosemide (LASIX) 20 MG tablet Take 1 tablet by mouth Daily. 90 tablet 3 5/3/2025    gabapentin (NEURONTIN) 400 MG capsule Take 1 capsule by mouth 3 (Three) Times a Day.   5/3/2025    glimepiride (AMARYL) 4 MG tablet Take 1 tablet by mouth Daily.   5/3/2025    losartan (COZAAR) 50 MG tablet Take 1 tablet by mouth Daily. 90 tablet 4 5/3/2025    tamsulosin (FLOMAX) 0.4 MG capsule 24 hr capsule Take 2 capsules by mouth Daily.   5/3/2025    ULORIC 40 MG tablet Take 1 tablet by mouth Daily.  3 5/3/2025    ACCU-CHEK BRANDON PLUS test strip TEST DAILY AS DIRECTED  1     ACCU-CHEK SOFTCLIX LANCETS lancets TEST DAILY AS DIRECTED  1     cetirizine (zyrTEC) 10 MG tablet Take 1 tablet by mouth Daily.       fluticasone (FLONASE) 50 MCG/ACT nasal spray 2 sprays into the nostril(s) as directed by provider Daily for 30 days. 16 g 0         Hospital medications:  aspirin, 81 mg, Oral, Daily  atorvastatin, 40 mg, Oral, Nightly  carvedilol, 6.25 mg, Oral, BID With Meals  cefTRIAXone, 1,000 mg, Intravenous, Q24H  cloNIDine, 0.1 mg, Oral, Q8H  famotidine, 20 mg, Oral, BID      sodium chloride, 75 mL/hr, Last Rate: 75 mL/hr (05/03/25 4421)        Insert Peripheral IV **AND** sodium chloride    Family history:  Family History   Problem Relation Age of Onset     Hypertension Mother     Heart attack Father        Social history:  Social History     Tobacco Use    Smoking status: Never    Smokeless tobacco: Never    Tobacco comments:     CAFFEINE USE: OCC. CHOCOLATE CANDY   Vaping Use    Vaping status: Never Used   Substance Use Topics    Alcohol use: Yes     Alcohol/week: 1.0 standard drink of alcohol     Types: 1 Cans of beer per week     Comment: social    Drug use: No       Review of systems:      12 point negative except as in HPI    Objective:  TMax 24 hours:   Temp (24hrs), Av.2 °F (36.8 °C), Min:96.3 °F (35.7 °C), Max:99.3 °F (37.4 °C)      Vitals Ranges:   Temp:  [96.3 °F (35.7 °C)-99.3 °F (37.4 °C)] 98 °F (36.7 °C)  Heart Rate:  [73-99] 73  Resp:  [16] 16  BP: (108-170)/(63-93) 108/91    Intake/Output Last 3 shifts:  I/O last 3 completed shifts:  In: 240 [P.O.:240]  Out: 3500 [Urine:3500]     Physical Exam:    General Appearance:    Alert, cooperative, NAD   Back:        Lungs:     Respirations unlabored, no wheezing   Abdomen:  :      Soft, NDNT, no palpable bladder distension, nontender    Morrell catheter in place draining dark brown/bloody urine, no clots    Neuro/Psych:   Orientation intact, mood/affect pleasant       Results review:   I reviewed the patient's new clinical results.    Data review:  Lab Results (last 24 hours)       Procedure Component Value Units Date/Time    CBC & Differential [[id number]]  (Abnormal) Collected: 25    Specimen: Blood Updated: 25    Narrative:      The following orders were created for panel order CBC & Differential.  Procedure                               Abnormality         Status                     ---------                               -----------         ------                     CBC Auto Differential[[id number]]        Abnormal            Final result                 Please view results for these tests on the individual orders.    CBC Auto Differential [[id number]]  (Abnormal) Collected:  05/04/25 0531    Specimen: Blood Updated: 05/04/25 0717     WBC 8.53 10*3/mm3      RBC 3.56 10*6/mm3      Hemoglobin 11.3 g/dL      Hematocrit 33.9 %      MCV 95.2 fL      MCH 31.7 pg      MCHC 33.3 g/dL      RDW 12.6 %      RDW-SD 43.7 fl      MPV 9.6 fL      Platelets 174 10*3/mm3      Neutrophil % 67.1 %      Lymphocyte % 18.8 %      Monocyte % 11.6 %      Eosinophil % 1.3 %      Basophil % 0.5 %      Immature Grans % 0.7 %      Neutrophils, Absolute 5.73 10*3/mm3      Lymphocytes, Absolute 1.60 10*3/mm3      Monocytes, Absolute 0.99 10*3/mm3      Eosinophils, Absolute 0.11 10*3/mm3      Basophils, Absolute 0.04 10*3/mm3      Immature Grans, Absolute 0.06 10*3/mm3      nRBC 0.0 /100 WBC     Basic Metabolic Panel [[id number]] Collected: 05/04/25 0531    Specimen: Blood Updated: 05/04/25 0709    Urine Culture - Urine, Urine, Clean Catch [[id number]] Collected: 05/03/25 1541    Specimen: Urine, Clean Catch Updated: 05/03/25 2016    Basic Metabolic Panel [[id number]]  (Abnormal) Collected: 05/03/25 1708    Specimen: Blood Updated: 05/03/25 1742     Glucose 152 mg/dL      BUN 46 mg/dL      Creatinine 2.33 mg/dL      Sodium 136 mmol/L      Potassium 4.8 mmol/L      Chloride 98 mmol/L      CO2 24.0 mmol/L      Calcium 9.2 mg/dL      BUN/Creatinine Ratio 19.7     Anion Gap 14.0 mmol/L      eGFR 26.7 mL/min/1.73     Narrative:      GFR Categories in Chronic Kidney Disease (CKD)              GFR Category          GFR (mL/min/1.73)    Interpretation  G1                    90 or greater        Normal or high (1)  G2                    60-89                Mild decrease (1)  G3a                   45-59                Mild to moderate decrease  G3b                   30-44                Moderate to severe decrease  G4                    15-29                Severe decrease  G5                    14 or less           Kidney failure    (1)In the absence of evidence of kidney disease, neither GFR category G1 or G2 fulfill the  criteria for CKD.    eGFR calculation 2021 CKD-EPI creatinine equation, which does not include race as a factor    CBC & Differential [[id number]]  (Abnormal) Collected: 05/03/25 1708    Specimen: Blood Updated: 05/03/25 1721    Narrative:      The following orders were created for panel order CBC & Differential.  Procedure                               Abnormality         Status                     ---------                               -----------         ------                     CBC Auto Differential[[id number]]        Abnormal            Final result                 Please view results for these tests on the individual orders.    CBC Auto Differential [[id number]]  (Abnormal) Collected: 05/03/25 1708    Specimen: Blood Updated: 05/03/25 1721     WBC 9.27 10*3/mm3      RBC 3.94 10*6/mm3      Hemoglobin 12.6 g/dL      Hematocrit 38.3 %      MCV 97.2 fL      MCH 32.0 pg      MCHC 32.9 g/dL      RDW 12.4 %      RDW-SD 44.0 fl      MPV 9.6 fL      Platelets 160 10*3/mm3      Neutrophil % 74.5 %      Lymphocyte % 13.9 %      Monocyte % 9.4 %      Eosinophil % 1.3 %      Basophil % 0.3 %      Immature Grans % 0.6 %      Neutrophils, Absolute 6.90 10*3/mm3      Lymphocytes, Absolute 1.29 10*3/mm3      Monocytes, Absolute 0.87 10*3/mm3      Eosinophils, Absolute 0.12 10*3/mm3      Basophils, Absolute 0.03 10*3/mm3      Immature Grans, Absolute 0.06 10*3/mm3      nRBC 0.0 /100 WBC     Urinalysis, Microscopic Only - Urine, Clean Catch [[id number]]  (Abnormal) Collected: 05/03/25 1541    Specimen: Urine, Clean Catch Updated: 05/03/25 1619     RBC, UA Too Numerous to Count /HPF      WBC, UA Too Numerous to Count /HPF      Bacteria, UA 4+ /HPF      Squamous Epithelial Cells, UA 0-2 /HPF      Hyaline Casts, UA 0-2 /LPF      Methodology Automated Microscopy    Urinalysis With Microscopic If Indicated (No Culture) - Urine, Clean Catch [[id number]]  (Abnormal) Collected: 05/03/25 1541    Specimen: Urine, Clean Catch Updated:  05/03/25 1559     Color, UA Orange     Comment: Any Substance that causes an abnormal urine color can alter the accuracy of the chemical reactions.        Appearance, UA Turbid     pH, UA <=5.0     Specific Gravity, UA 1.014     Glucose,  mg/dL (2+)     Ketones, UA Negative     Bilirubin, UA Negative     Blood, UA Large (3+)     Protein,  mg/dL (2+)     Leuk Esterase, UA Large (3+)     Nitrite, UA Positive     Urobilinogen, UA 0.2 E.U./dL             Imaging:  Imaging Results (Last 24 Hours)       Procedure Component Value Units Date/Time    CT Abdomen Pelvis Without Contrast [[id number]] Collected: 05/03/25 1737     Updated: 05/03/25 1754    Narrative:      CT ABDOMEN AND PELVIS WITHOUT CONTRAST:     HISTORY: uti, eval for stone; N30.90-Cystitis, unspecified without  hematuria     TECHNIQUE: Helical CT was performed of the abdomen and pelvis from the  lung bases through the lesser trochanters without IV contrast.  Reformatted images were provided in the coronal and sagittal planes.  Radiation dose reduction techniques were utilized, including automated  exposure control, and exposure modulation based on body size.     COMPARISON: CT abdomen/pelvis 1/20/2020     FINDINGS:     Lung bases: Visualized lung bases are unremarkable.     Abdomen: The liver and gallbladder are normal.  The spleen and pancreas  appear normal.  Both adrenal glands are normal. There is no evidence of  bowel obstruction. There is no evidence of free intraperitoneal air or  abnormal intra-abdominal fluid collection, adenopathy or other mass. The  aorta is normal in caliber.       There is mild bilateral hydronephrosis. There is no evidence of  nephrolithiasis or ureterolithiasis on either side. The urinary bladder  is distended and there is wall thickening with pneumatosis.     Pelvis: There is no pelvic adenopathy or soft tissue mass. In addition  to urinary bladder pneumatosis, there appears to be air in a few  adjacent pelvic  veins. There is no CT evidence of hernia or bowel  obstruction.     There are spinal degenerative changes, but there is no acute bony  abnormality.       Impression:         Emphysematous cystitis, urinary bladder distention with pneumatosis of  the bladder wall, as well as involving a few small pelvic veins adjacent  to the bladder base, and there is mild bilateral secondary  hydronephrosis.     CALL REPORT :  Results of the exam were discussed with Dr. Lee, the  physician caring for the patient in the emergency room at the time of  this dictation.              This report was finalized on 5/3/2025 5:51 PM by Dr. Bassem Haynes M.D  on Workstation: [id number]                  Assessment:     Emphysematous cystitis   Bilateral hydronephrosis   JUDY  Gross hematuria- secondary to lopez placement     Plan:     - keep lopez  - hold AM dose of ASA 81mg    - encourage fluids, limited bladder irritants   - continue IV antibioitcs, while awaiting culture   - Urology will continue to follow through stability      Una Choudhury, CLAIR  05/04/25  07:20 EDT

## 2025-05-04 NOTE — PLAN OF CARE
Goal Outcome Evaluation:      Pt admitted to unit tonight from ED with cystitis. VSS. A/o x4. Up x1 assist. F/c in place with dark red output. Urology consulted.

## 2025-05-04 NOTE — PLAN OF CARE
Problem: Adult Inpatient Plan of Care  Goal: Plan of Care Review  Outcome: Progressing  Flowsheets (Taken 5/4/2025 1525)  Progress: no change  Outcome Evaluation: vss. aaox4. ivf infusing per order. lopez catheter remains in place and patent, urine remains dark red, small clots visible. discussed with urology, plan to monitor UOP and notify if possible occlusion/obstruction has occured d/t clots. increased po intake encouraged  Plan of Care Reviewed With: patient   Goal Outcome Evaluation:  Plan of Care Reviewed With: patient        Progress: no change  Outcome Evaluation: vss. aaox4. ivf infusing per order. lopez catheter remains in place and patent, urine remains dark red, small clots visible. discussed with urology, plan to monitor UOP and notify if possible occlusion/obstruction has occured d/t clots. increased po intake encouraged

## 2025-05-05 LAB
ALBUMIN SERPL-MCNC: 2.6 G/DL (ref 3.5–5.2)
ALBUMIN/GLOB SERPL: 0.9 G/DL
ALP SERPL-CCNC: 63 U/L (ref 39–117)
ALT SERPL W P-5'-P-CCNC: 18 U/L (ref 1–41)
ANION GAP SERPL CALCULATED.3IONS-SCNC: 7.7 MMOL/L (ref 5–15)
AST SERPL-CCNC: 13 U/L (ref 1–40)
BACTERIA SPEC AEROBE CULT: ABNORMAL
BASOPHILS # BLD AUTO: 0.05 10*3/MM3 (ref 0–0.2)
BASOPHILS NFR BLD AUTO: 0.8 % (ref 0–1.5)
BILIRUB SERPL-MCNC: 0.3 MG/DL (ref 0–1.2)
BUN SERPL-MCNC: 32 MG/DL (ref 8–23)
BUN/CREAT SERPL: 22.5 (ref 7–25)
CALCIUM SPEC-SCNC: 8 MG/DL (ref 8.6–10.5)
CHLORIDE SERPL-SCNC: 107 MMOL/L (ref 98–107)
CHOLEST SERPL-MCNC: 139 MG/DL (ref 0–200)
CO2 SERPL-SCNC: 23.3 MMOL/L (ref 22–29)
CREAT SERPL-MCNC: 1.42 MG/DL (ref 0.76–1.27)
DEPRECATED RDW RBC AUTO: 44.8 FL (ref 37–54)
EGFRCR SERPLBLD CKD-EPI 2021: 48.4 ML/MIN/1.73
EOSINOPHIL # BLD AUTO: 0.16 10*3/MM3 (ref 0–0.4)
EOSINOPHIL NFR BLD AUTO: 2.7 % (ref 0.3–6.2)
ERYTHROCYTE [DISTWIDTH] IN BLOOD BY AUTOMATED COUNT: 12.7 % (ref 12.3–15.4)
GLOBULIN UR ELPH-MCNC: 3 GM/DL
GLUCOSE BLDC GLUCOMTR-MCNC: 152 MG/DL (ref 70–130)
GLUCOSE BLDC GLUCOMTR-MCNC: 156 MG/DL (ref 70–130)
GLUCOSE BLDC GLUCOMTR-MCNC: 235 MG/DL (ref 70–130)
GLUCOSE BLDC GLUCOMTR-MCNC: 82 MG/DL (ref 70–130)
GLUCOSE SERPL-MCNC: 80 MG/DL (ref 65–99)
HBA1C MFR BLD: 7.3 % (ref 4.8–5.6)
HCT VFR BLD AUTO: 30.7 % (ref 37.5–51)
HDLC SERPL-MCNC: 29 MG/DL (ref 40–60)
HGB BLD-MCNC: 10.3 G/DL (ref 13–17.7)
IMM GRANULOCYTES # BLD AUTO: 0.07 10*3/MM3 (ref 0–0.05)
IMM GRANULOCYTES NFR BLD AUTO: 1.2 % (ref 0–0.5)
LDLC SERPL CALC-MCNC: 88 MG/DL (ref 0–100)
LDLC/HDLC SERPL: 2.99 {RATIO}
LYMPHOCYTES # BLD AUTO: 1.52 10*3/MM3 (ref 0.7–3.1)
LYMPHOCYTES NFR BLD AUTO: 25.6 % (ref 19.6–45.3)
MAGNESIUM SERPL-MCNC: 2.4 MG/DL (ref 1.6–2.4)
MCH RBC QN AUTO: 32.5 PG (ref 26.6–33)
MCHC RBC AUTO-ENTMCNC: 33.6 G/DL (ref 31.5–35.7)
MCV RBC AUTO: 96.8 FL (ref 79–97)
MONOCYTES # BLD AUTO: 0.75 10*3/MM3 (ref 0.1–0.9)
MONOCYTES NFR BLD AUTO: 12.6 % (ref 5–12)
NEUTROPHILS NFR BLD AUTO: 3.38 10*3/MM3 (ref 1.7–7)
NEUTROPHILS NFR BLD AUTO: 57.1 % (ref 42.7–76)
NRBC BLD AUTO-RTO: 0 /100 WBC (ref 0–0.2)
NT-PROBNP SERPL-MCNC: 576 PG/ML (ref 0–1800)
PHOSPHATE SERPL-MCNC: 3 MG/DL (ref 2.5–4.5)
PLATELET # BLD AUTO: 146 10*3/MM3 (ref 140–450)
PMV BLD AUTO: 9.6 FL (ref 6–12)
POTASSIUM SERPL-SCNC: 4.4 MMOL/L (ref 3.5–5.2)
PROT SERPL-MCNC: 5.6 G/DL (ref 6–8.5)
RBC # BLD AUTO: 3.17 10*6/MM3 (ref 4.14–5.8)
SODIUM SERPL-SCNC: 138 MMOL/L (ref 136–145)
TRIGL SERPL-MCNC: 117 MG/DL (ref 0–150)
TSH SERPL DL<=0.05 MIU/L-ACNC: 1.53 UIU/ML (ref 0.27–4.2)
URATE SERPL-MCNC: 1.5 MG/DL (ref 3.4–7)
VLDLC SERPL-MCNC: 22 MG/DL (ref 5–40)
WBC NRBC COR # BLD AUTO: 5.93 10*3/MM3 (ref 3.4–10.8)

## 2025-05-05 PROCEDURE — 82948 REAGENT STRIP/BLOOD GLUCOSE: CPT

## 2025-05-05 PROCEDURE — 80061 LIPID PANEL: CPT | Performed by: HOSPITALIST

## 2025-05-05 PROCEDURE — 83036 HEMOGLOBIN GLYCOSYLATED A1C: CPT | Performed by: HOSPITALIST

## 2025-05-05 PROCEDURE — 83735 ASSAY OF MAGNESIUM: CPT | Performed by: INTERNAL MEDICINE

## 2025-05-05 PROCEDURE — 83880 ASSAY OF NATRIURETIC PEPTIDE: CPT | Performed by: HOSPITALIST

## 2025-05-05 PROCEDURE — 84550 ASSAY OF BLOOD/URIC ACID: CPT | Performed by: INTERNAL MEDICINE

## 2025-05-05 PROCEDURE — 25810000003 SODIUM CHLORIDE 0.9 % SOLUTION: Performed by: HOSPITALIST

## 2025-05-05 PROCEDURE — 84443 ASSAY THYROID STIM HORMONE: CPT | Performed by: HOSPITALIST

## 2025-05-05 PROCEDURE — 85025 COMPLETE CBC W/AUTO DIFF WBC: CPT | Performed by: HOSPITALIST

## 2025-05-05 PROCEDURE — 25010000002 CEFTRIAXONE PER 250 MG: Performed by: HOSPITALIST

## 2025-05-05 PROCEDURE — 80053 COMPREHEN METABOLIC PANEL: CPT | Performed by: INTERNAL MEDICINE

## 2025-05-05 PROCEDURE — 63710000001 INSULIN LISPRO (HUMAN) PER 5 UNITS: Performed by: HOSPITALIST

## 2025-05-05 PROCEDURE — 84100 ASSAY OF PHOSPHORUS: CPT | Performed by: INTERNAL MEDICINE

## 2025-05-05 RX ADMIN — SODIUM CHLORIDE 1000 MG: 9 INJECTION, SOLUTION INTRAVENOUS at 21:18

## 2025-05-05 RX ADMIN — INSULIN LISPRO 2 UNITS: 100 INJECTION, SOLUTION INTRAVENOUS; SUBCUTANEOUS at 12:26

## 2025-05-05 RX ADMIN — CARVEDILOL 6.25 MG: 6.25 TABLET, FILM COATED ORAL at 17:26

## 2025-05-05 RX ADMIN — CARVEDILOL 6.25 MG: 6.25 TABLET, FILM COATED ORAL at 09:22

## 2025-05-05 RX ADMIN — CLONIDINE HYDROCHLORIDE 0.1 MG: 0.1 TABLET ORAL at 13:38

## 2025-05-05 RX ADMIN — INSULIN LISPRO 2 UNITS: 100 INJECTION, SOLUTION INTRAVENOUS; SUBCUTANEOUS at 17:26

## 2025-05-05 RX ADMIN — CLONIDINE HYDROCHLORIDE 0.1 MG: 0.1 TABLET ORAL at 06:16

## 2025-05-05 RX ADMIN — GABAPENTIN 400 MG: 400 CAPSULE ORAL at 09:22

## 2025-05-05 RX ADMIN — Medication 1000 UNITS: at 09:32

## 2025-05-05 RX ADMIN — FAMOTIDINE 20 MG: 20 TABLET, FILM COATED ORAL at 09:22

## 2025-05-05 RX ADMIN — INSULIN LISPRO 3 UNITS: 100 INJECTION, SOLUTION INTRAVENOUS; SUBCUTANEOUS at 21:23

## 2025-05-05 RX ADMIN — ATORVASTATIN CALCIUM 40 MG: 20 TABLET, FILM COATED ORAL at 20:25

## 2025-05-05 RX ADMIN — GABAPENTIN 400 MG: 400 CAPSULE ORAL at 16:37

## 2025-05-05 RX ADMIN — GABAPENTIN 400 MG: 400 CAPSULE ORAL at 20:25

## 2025-05-05 RX ADMIN — SODIUM CHLORIDE 75 ML/HR: 9 INJECTION, SOLUTION INTRAVENOUS at 02:03

## 2025-05-05 RX ADMIN — FAMOTIDINE 20 MG: 20 TABLET, FILM COATED ORAL at 20:25

## 2025-05-05 RX ADMIN — TAMSULOSIN HYDROCHLORIDE 0.8 MG: 0.4 CAPSULE ORAL at 09:22

## 2025-05-05 RX ADMIN — CLONIDINE HYDROCHLORIDE 0.1 MG: 0.1 TABLET ORAL at 21:18

## 2025-05-05 RX ADMIN — FEBUXOSTAT 40 MG: 40 TABLET, FILM COATED ORAL at 09:22

## 2025-05-05 RX ADMIN — OXYCODONE HYDROCHLORIDE AND ACETAMINOPHEN 500 MG: 500 TABLET ORAL at 09:22

## 2025-05-05 NOTE — PLAN OF CARE
Problem: Adult Inpatient Plan of Care  Goal: Plan of Care Review  Outcome: Progressing  Flowsheets  Taken 5/5/2025 1753 by Lenin Longo RN  Outcome Evaluation: Ax4. BREANN Morrell will remain after DC. Flouids still running. Some reports of L knee pain seem to be positional.  Taken 5/5/2025 0657 by Kai James RN  Progress: improving  Plan of Care Reviewed With: patient  Goal: Patient-Specific Goal (Individualized)  Outcome: Progressing  Goal: Absence of Hospital-Acquired Illness or Injury  Outcome: Progressing  Intervention: Identify and Manage Fall Risk  Recent Flowsheet Documentation  Taken 5/5/2025 1600 by Lenin Longo RN  Safety Promotion/Fall Prevention:   activity supervised   assistive device/personal items within reach   clutter free environment maintained   fall prevention program maintained   nonskid shoes/slippers when out of bed   gait belt   room organization consistent   safety round/check completed  Taken 5/5/2025 1005 by Lenin Longo RN  Safety Promotion/Fall Prevention:   activity supervised   assistive device/personal items within reach   clutter free environment maintained   fall prevention program maintained   nonskid shoes/slippers when out of bed   gait belt   room organization consistent   safety round/check completed  Taken 5/5/2025 0800 by Lenin Longo RN  Safety Promotion/Fall Prevention:   activity supervised   assistive device/personal items within reach   clutter free environment maintained   fall prevention program maintained   nonskid shoes/slippers when out of bed   gait belt   room organization consistent   safety round/check completed  Intervention: Prevent Skin Injury  Recent Flowsheet Documentation  Taken 5/5/2025 1348 by Lenin Longo RN  Body Position: legs elevated  Goal: Optimal Comfort and Wellbeing  Outcome: Progressing  Intervention: Monitor Pain and Promote Comfort  Recent Flowsheet Documentation  Taken 5/5/2025 1348 by Lenin Longo RN  Pain  Management Interventions: cold applied  Goal: Readiness for Transition of Care  Outcome: Progressing     Problem: Comorbidity Management  Goal: Blood Glucose Level Within Target Range  Outcome: Progressing  Goal: Maintenance of Heart Failure Symptom Control  Outcome: Progressing  Goal: Blood Pressure in Desired Range  Outcome: Progressing     Problem: Sepsis/Septic Shock  Goal: Optimal Coping  Outcome: Progressing  Goal: Absence of Bleeding  Outcome: Progressing  Goal: Blood Glucose Level Within Target Range  Outcome: Progressing  Goal: Absence of Infection Signs and Symptoms  Outcome: Progressing  Intervention: Promote Recovery  Recent Flowsheet Documentation  Taken 5/5/2025 1005 by Lenin Longo RN  Activity Management:   up in chair   ambulated in room  Goal: Optimal Nutrition Delivery  Outcome: Progressing     Problem: Fall Injury Risk  Goal: Absence of Fall and Fall-Related Injury  Outcome: Progressing  Intervention: Promote Injury-Free Environment  Recent Flowsheet Documentation  Taken 5/5/2025 1600 by Lenin Longo RN  Safety Promotion/Fall Prevention:   activity supervised   assistive device/personal items within reach   clutter free environment maintained   fall prevention program maintained   nonskid shoes/slippers when out of bed   gait belt   room organization consistent   safety round/check completed  Taken 5/5/2025 1005 by Lenin Longo RN  Safety Promotion/Fall Prevention:   activity supervised   assistive device/personal items within reach   clutter free environment maintained   fall prevention program maintained   nonskid shoes/slippers when out of bed   gait belt   room organization consistent   safety round/check completed  Taken 5/5/2025 0800 by Lenin Longo RN  Safety Promotion/Fall Prevention:   activity supervised   assistive device/personal items within reach   clutter free environment maintained   fall prevention program maintained   nonskid shoes/slippers when out of bed   gait  belt   room organization consistent   safety round/check completed     Problem: Skin Injury Risk Increased  Goal: Skin Health and Integrity  Outcome: Progressing  Intervention: Optimize Skin Protection  Recent Flowsheet Documentation  Taken 5/5/2025 1005 by Lenin Longo, RN  Activity Management:   up in chair   ambulated in room   Goal Outcome Evaluation:              Outcome Evaluation: Ax4. VSSAdrian Morrell will remain after DC. Flouids still running. Some reports of L knee pain seem to be positional.

## 2025-05-05 NOTE — PROGRESS NOTES
[facility] Progress Note      Patient Name: Vj Bright  : 1940  MRN: [id number]  Primary Care Physician:  Nany Real MD  Date of admission: 5/3/2025    Subjective     Interval History:   The patient was seen and examined today for follow-up on acute kidney  Doing better today.  No new complaints today.  Denies any nausea or vomiting.  No fever no chills    Review of Systems:   As noted above    Objective     Vitals:   Temp:  [98 °F (36.7 °C)-98.5 °F (36.9 °C)] 98.3 °F (36.8 °C)  Heart Rate:  [63-72] 64  Resp:  [16] 16  BP: ()/(54-66) 115/57    Intake/Output Summary (Last 24 hours) at 2025 0957  Last data filed at 2025 0443  Gross per 24 hour   Intake 2611.25 ml   Output 2250 ml   Net 361.25 ml       Physical Exam:    General Appearance: alert, oriented x 3, no acute distress   Skin: warm and dry  HEENT: oral mucosa normal, nonicteric sclera  Neck: supple, no JVD  Lungs: CTA  Heart: RRR, normal S1 and S2  Abdomen: soft, nontender, nondistended  : no palpable bladder  Extremities: no edema, cyanosis or clubbing  Neuro: normal speech and mental status     Scheduled Meds:     ascorbic acid, 500 mg, Oral, Daily  atorvastatin, 40 mg, Oral, Nightly  carvedilol, 6.25 mg, Oral, BID With Meals  cefTRIAXone, 1,000 mg, Intravenous, Q24H  cholecalciferol, , Oral, Daily  cloNIDine, 0.1 mg, Oral, Q8H  famotidine, 20 mg, Oral, BID  febuxostat, 40 mg, Oral, Daily  fluticasone, 2 spray, Nasal, Daily  gabapentin, 400 mg, Oral, TID  insulin lispro, 2-7 Units, Subcutaneous, 4x Daily AC & at Bedtime  tamsulosin, 0.8 mg, Oral, Daily      IV Meds:   sodium chloride, 75 mL/hr, Last Rate: 75 mL/hr (25 0203)        Results Reviewed:   I have personally reviewed the results from the time of this admission to 2025 09:57 EDT     Results from last 7 days   Lab Units 25  0554 25  0531 25  1708   SODIUM mmol/L 138 137 136   POTASSIUM mmol/L 4.4 4.7 4.8    CHLORIDE mmol/L 107 104 98   CO2 mmol/L 23.3 23.0 24.0   BUN mg/dL 32* 39* 46*   CREATININE mg/dL 1.42* 1.64* 2.33*   CALCIUM mg/dL 8.0* 8.2* 9.2   BILIRUBIN mg/dL 0.3  --   --    ALK PHOS U/L 63  --   --    ALT (SGPT) U/L 18  --   --    AST (SGOT) U/L 13  --   --    GLUCOSE mg/dL 80 60* 152*       Estimated Creatinine Clearance: 43.5 mL/min (A) (by C-G formula based on SCr of 1.42 mg/dL (H)).    Results from last 7 days   Lab Units 05/05/25  0554   MAGNESIUM mg/dL 2.4   PHOSPHORUS mg/dL 3.0       Results from last 7 days   Lab Units 05/05/25  0554   URIC ACID mg/dL 1.5*       Results from last 7 days   Lab Units 05/05/25  0554 05/04/25  0531 05/03/25  1708   WBC 10*3/mm3 5.93 8.53 9.27   HEMOGLOBIN g/dL 10.3* 11.3* 12.6*   PLATELETS 10*3/mm3 146 174 160             Assessment / Plan     ASSESSMENT:  Acute kidney injury- Secondary to acute urinary obstruction. CT abd/pelvis without revealed emphysematous cystitis and mild bilateral hydro. Renal function improved with placement of lopez catheter from 2.3 on admission, now down to 1.4 mg/dl today. Electrolytes are acceptable. UA positive for UTI  Chronic kidney disease stage IIIb- Etiology secondary to hypertensive nephrosclerosis, diabetic nephropathy and chronic bladder outlet obstruction from BPH. Followed by Dr. SHUN Garvey with out group as an outpatient. Baseline creatinine fluctuates between 1.4-1.8 mg/dl.   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  Antibiotic by primary and urology  BPH- on BID flomax as an outpatient. Followed by Urology.   Hypertensive chronic kidney disease- BP is soft today. On losartan as an outpatient which is being held due to JUDY. Currently being treated with clonidine and carvedilol.   Type II diabetes mellitus- Per primary team. Patient is on Farxiga as an outpatient. Currently being held due to JUDY. Would recommend continuing to hold until infection is cleared given increased risk for   infections with SGLT-2i medications.   Anemia in chronic kidney disease- complicated by acute blood loss from hematuria  Hyperuricemia on Uloric.  uric acid down to 1.5        PLAN:  Kidney function continues to improve  Will hold febuxostat  Okay to discharge home from nephrology standpoint  If discharged manage and follow-up with nephrology clinic in 1-2      Thank you for involving us in the care of Vj Bright.  Please feel free to call with any questions.    Kunal Rivera MD  05/05/25  09:57 EDT    [facility]  [phone]    Parts of this note may be an electronic transcription/translation of spoken language to printed text using the Dragon dictation system.

## 2025-05-05 NOTE — PROGRESS NOTES
"Daily progress note    Primary care physician      Subjective  Awake and alert and feeling much better than yesterday with no new complaints but has a lot of question about Morrell catheter placement.    History of present illness  84-year-old -American male with history of aortic stenosis coronary artery disease congestive heart failure diabetes hypertension hyperlipidemia atrial fibrillation pulmonary hypertension and chronic kidney disease presented to [facility] emergency room with dysuria for last 1 week which is getting worse.  Patient also have lower abdominal pain with nausea but no vomiting diarrhea.  Patient workup in ER revealed emphysematous cystitis with acute kidney injury and urine retention with bilateral hydronephrosis admitted for management.  Patient has no chest pain shortness of breath palpitation.     REVIEW OF SYSTEMS  All systems reviewed and negative except for those discussed in HPI.      PHYSICAL EXAM  Blood pressure 118/52, pulse 66, temperature 98 °F (36.7 °C), temperature source Oral, resp. rate 16, height 182.9 cm (72\"), weight 80.9 kg (178 lb 5.6 oz), SpO2 98%.    GENERAL: Awake and alert no acute distress  HENT: nares patent  EYES: no scleral icterus  CV: regular rhythm, normal rate  RESPIRATORY: normal effort and moving air bilaterally  ABDOMEN: soft, suprapubic tenderness, bowel sounds positive  MUSCULOSKELETAL: no deformity  NEURO: alert, moves all extremities, follows commands  PSYCH:  calm, cooperative  SKIN: warm, dry     LAB RESULTS  Lab Results (last 24 hours)       Procedure Component Value Units Date/Time    POC Glucose Once [[id number]]  (Abnormal) Collected: 05/05/25 1113    Specimen: Blood Updated: 05/05/25 1115     Glucose 156 mg/dL     BNP [[id number]]  (Normal) Collected: 05/05/25 0554    Specimen: Blood Updated: 05/05/25 0703     proBNP 576.0 pg/mL     Narrative:      This assay is used as an aid in the diagnosis of individuals suspected of " having heart failure. It can be used as an aid in the diagnosis of acute decompensated heart failure (ADHF) in patients presenting with signs and symptoms of ADHF to the emergency department (ED). In addition, NT-proBNP of <300 pg/mL indicates ADHF is not likely.    Age Range Result Interpretation  NT-proBNP Concentration (pg/mL:      <50             Positive            >450                   Gray                 300-450                    Negative             <300    50-75           Positive            >900                  Gray                300-900                  Negative            <300      >75             Positive            >1800                  Gray                300-1800                  Negative            <300    Comprehensive Metabolic Panel [[id number]]  (Abnormal) Collected: 05/05/25 0554    Specimen: Blood Updated: 05/05/25 0703     Glucose 80 mg/dL      BUN 32 mg/dL      Creatinine 1.42 mg/dL      Sodium 138 mmol/L      Potassium 4.4 mmol/L      Chloride 107 mmol/L      CO2 23.3 mmol/L      Calcium 8.0 mg/dL      Total Protein 5.6 g/dL      Albumin 2.6 g/dL      ALT (SGPT) 18 U/L      AST (SGOT) 13 U/L      Alkaline Phosphatase 63 U/L      Total Bilirubin 0.3 mg/dL      Globulin 3.0 gm/dL      A/G Ratio 0.9 g/dL      BUN/Creatinine Ratio 22.5     Anion Gap 7.7 mmol/L      eGFR 48.4 mL/min/1.73     Narrative:      GFR Categories in Chronic Kidney Disease (CKD)              GFR Category          GFR (mL/min/1.73)    Interpretation  G1                    90 or greater        Normal or high (1)  G2                    60-89                Mild decrease (1)  G3a                   45-59                Mild to moderate decrease  G3b                   30-44                Moderate to severe decrease  G4                    15-29                Severe decrease  G5                    14 or less           Kidney failure    (1)In the absence of evidence of kidney disease, neither GFR category G1 or G2 fulfill  the criteria for CKD.    eGFR calculation 2021 CKD-EPI creatinine equation, which does not include race as a factor    Uric Acid [[id number]]  (Abnormal) Collected: 05/05/25 0554    Specimen: Blood Updated: 05/05/25 0703     Uric Acid 1.5 mg/dL     Magnesium [[id number]]  (Normal) Collected: 05/05/25 0554    Specimen: Blood Updated: 05/05/25 0703     Magnesium 2.4 mg/dL     Phosphorus [[id number]]  (Normal) Collected: 05/05/25 0554    Specimen: Blood Updated: 05/05/25 0703     Phosphorus 3.0 mg/dL     TSH [[id number]]  (Normal) Collected: 05/05/25 0554    Specimen: Blood Updated: 05/05/25 0703     TSH 1.530 uIU/mL     Lipid Panel [[id number]]  (Abnormal) Collected: 05/05/25 0554    Specimen: Blood Updated: 05/05/25 0703     Total Cholesterol 139 mg/dL      Triglycerides 117 mg/dL      HDL Cholesterol 29 mg/dL      LDL Cholesterol  88 mg/dL      VLDL Cholesterol 22 mg/dL      LDL/HDL Ratio 2.99    Narrative:      Cholesterol Reference Ranges  (U.S. Department of Health and Human Services ATP III Classifications)    Desirable          <200 mg/dL  Borderline High    200-239 mg/dL  High Risk          >240 mg/dL      Triglyceride Reference Ranges  (U.S. Department of Health and Human Services ATP III Classifications)    Normal           <150 mg/dL  Borderline High  150-199 mg/dL  High             200-499 mg/dL  Very High        >500 mg/dL    HDL Reference Ranges  (U.S. Department of Health and Human Services ATP III Classifications)    Low     <40 mg/dl (major risk factor for CHD)  High    >60 mg/dl ('negative' risk factor for CHD)        LDL Reference Ranges  (U.S. Department of Health and Human Services ATP III Classifications)    Optimal          <100 mg/dL  Near Optimal     100-129 mg/dL  Borderline High  130-159 mg/dL  High             160-189 mg/dL  Very High        >189 mg/dL    LDL is calculated using the NIH LDL-C calculation.      Hemoglobin A1c [[id number]]  (Abnormal) Collected: 05/05/25 0554    Specimen: Blood  Updated: 05/05/25 0648     Hemoglobin A1C 7.30 %     Narrative:      Hemoglobin A1C Ranges:    Increased Risk for Diabetes  5.7% to 6.4%  Diabetes                     >= 6.5%  Diabetic Goal                < 7.0%    CBC & Differential [[id number]]  (Abnormal) Collected: 05/05/25 0554    Specimen: Blood Updated: 05/05/25 0636    Narrative:      The following orders were created for panel order CBC & Differential.  Procedure                               Abnormality         Status                     ---------                               -----------         ------                     CBC Auto Differential[[id number]]        Abnormal            Final result                 Please view results for these tests on the individual orders.    CBC Auto Differential [[id number]]  (Abnormal) Collected: 05/05/25 0554    Specimen: Blood Updated: 05/05/25 0636     WBC 5.93 10*3/mm3      RBC 3.17 10*6/mm3      Hemoglobin 10.3 g/dL      Hematocrit 30.7 %      MCV 96.8 fL      MCH 32.5 pg      MCHC 33.6 g/dL      RDW 12.7 %      RDW-SD 44.8 fl      MPV 9.6 fL      Platelets 146 10*3/mm3      Neutrophil % 57.1 %      Lymphocyte % 25.6 %      Monocyte % 12.6 %      Eosinophil % 2.7 %      Basophil % 0.8 %      Immature Grans % 1.2 %      Neutrophils, Absolute 3.38 10*3/mm3      Lymphocytes, Absolute 1.52 10*3/mm3      Monocytes, Absolute 0.75 10*3/mm3      Eosinophils, Absolute 0.16 10*3/mm3      Basophils, Absolute 0.05 10*3/mm3      Immature Grans, Absolute 0.07 10*3/mm3      nRBC 0.0 /100 WBC     Urine Culture - Urine, Urine, Clean Catch [[id number]]  (Abnormal)  (Susceptibility) Collected: 05/03/25 1541    Specimen: Urine, Clean Catch Updated: 05/05/25 0630     Urine Culture >100,000 CFU/mL Escherichia coli    Narrative:      Colonization of the urinary tract without infection is common. Treatment is discouraged unless the patient is symptomatic, pregnant, or undergoing an invasive urologic procedure.    Susceptibility         Escherichia coli      LA      Amoxicillin + Clavulanate Susceptible      Ampicillin Intermediate      Ampicillin + Sulbactam Susceptible      Cefazolin (Urine) Susceptible      Cefepime Susceptible      Ceftazidime Susceptible      Ceftriaxone Susceptible      Gentamicin Susceptible      Levofloxacin Susceptible      Nitrofurantoin Susceptible      Piperacillin + Tazobactam Susceptible      Trimethoprim + Sulfamethoxazole Susceptible                           POC Glucose Once [[id number]]  (Normal) Collected: 05/05/25 0525    Specimen: Blood Updated: 05/05/25 0527     Glucose 82 mg/dL     POC Glucose Once [[id number]]  (Abnormal) Collected: 05/04/25 2019    Specimen: Blood Updated: 05/04/25 2020     Glucose 153 mg/dL     POC Glucose Once [[id number]]  (Abnormal) Collected: 05/04/25 1552    Specimen: Blood Updated: 05/04/25 1553     Glucose 212 mg/dL           Imaging Results (Last 24 Hours)       ** No results found for the last 24 hours. **            Current Facility-Administered Medications:     ascorbic acid (VITAMIN C) tablet 500 mg, 500 mg, Oral, Daily, Ochoa Flores MD, 500 mg at 05/05/25 0922    atorvastatin (LIPITOR) tablet 40 mg, 40 mg, Oral, Nightly, Ochoa Flores MD, 40 mg at 05/04/25 2257    carvedilol (COREG) tablet 6.25 mg, 6.25 mg, Oral, BID With Meals, Ochoa Flores MD, 6.25 mg at 05/05/25 0922    cefTRIAXone (ROCEPHIN) 1,000 mg in sodium chloride 0.9 % 100 mL MBP, 1,000 mg, Intravenous, Q24H, Ochoa Flores MD, Last Rate: 200 mL/hr at 05/04/25 2257, 1,000 mg at 05/04/25 2257    cholecalciferol (VITAMIN D3) tablet, , Oral, Daily, Ochoa Flores MD, 1,000 Units at 05/05/25 0932    cloNIDine (CATAPRES) tablet 0.1 mg, 0.1 mg, Oral, Q8H, Ochoa Flores MD, 0.1 mg at 05/05/25 1338    dextrose (D50W) (25 g/50 mL) IV injection 25 g, 25 g, Intravenous, Q15 Min PRN, Ochoa Flores MD    dextrose (GLUTOSE) oral gel 15 g, 15 g, Oral, Q15 Min PRN, Ochoa Flores MD    famotidine (PEPCID) tablet 20 mg, 20 mg, Oral,  BID, Olga Lidia Flores MD, 20 mg at 05/05/25 0922    fluticasone (FLONASE) 50 MCG/ACT nasal spray 2 spray, 2 spray, Nasal, Daily, Olga Lidia Flores MD    gabapentin (NEURONTIN) capsule 400 mg, 400 mg, Oral, TID, Olga Lidia Flores MD, 400 mg at 05/05/25 0922    glucagon (GLUCAGEN) injection 1 mg, 1 mg, Intramuscular, Q15 Min PRN, Olga Lidia Flores MD    insulin lispro (HUMALOG/ADMELOG) injection 2-7 Units, 2-7 Units, Subcutaneous, 4x Daily AC & at Bedtime, Olga Lidia Flores MD, 2 Units at 05/05/25 1226    Insert Peripheral IV, , , Once **AND** sodium chloride 0.9 % flush 10 mL, 10 mL, Intravenous, PRN, Olga Lidia Flores MD    sodium chloride 0.9 % infusion, 75 mL/hr, Intravenous, Continuous, Olga Lidia Flores MD, Last Rate: 75 mL/hr at 05/05/25 0203, 75 mL/hr at 05/05/25 0203    tamsulosin (FLOMAX) 24 hr capsule 0.8 mg, 0.8 mg, Oral, Daily, Olga Lidia Flores MD, 0.8 mg at 05/05/25 0922     ASSESSMENT  Acute E. coli emphysematous cystitis  Urinary retention  Bilateral hydronephrosis  Acute kidney injury  Paroxysmal atrial fibrillation  Chronic diastolic congestive heart failure  Aortic stenosis status post aortic valve replacement  Diabetes mellitus  Hypertension  Hyperlipidemia  BPH  Neuropathy  Chronic kidney disease stage III    PLAN  CPM  Continue Morrell catheter   Continue IVF  Continue IV antibiotics  Continue to hold diuretics Cozaar and Amaryl  Nephrology consult appreciated  Urology to follow patient  Adjust home medications  Stress ulcer DVT prophylaxis  Supportive care  PT OT  Discussed with nursing staff and nephrology  Follow closely further recommendation according to hospital course    OLGA LIDIA FLORES MD    Copied text in this note has been reviewed and is accurate as of 05/05/25

## 2025-05-05 NOTE — PROGRESS NOTES
"   LOS: 2 days   Patient Care Team:  Nany Real MD as PCP - General (Family Medicine)      Subjective   Interval History: No problems with catheter    Objective     ROS   12 POINT NEG ROS PERTINENT IN HPI      Vital Signs  Temp:  [98 °F (36.7 °C)-98.5 °F (36.9 °C)] 98.3 °F (36.8 °C)  Heart Rate:  [63-72] 65  Resp:  [16] 16  BP: ()/(54-66) 115/57      Intake/Output Summary (Last 24 hours) at 5/5/2025 0804  Last data filed at 5/5/2025 0443  Gross per 24 hour   Intake 2611.25 ml   Output 2400 ml   Net 211.25 ml       Flowsheet Rows      Flowsheet Row First Filed Value   Admission Height 182.9 cm (72\") Documented at 05/03/2025 1952   Admission Weight 80.9 kg (178 lb 5.6 oz) Documented at 05/03/2025 1952            Physical Exam:     General appearance: alert, cooperative, oriented  Phillip intact, urine yellow and clear        Lab Results (all)       Procedure Component Value Units Date/Time    BNP [[id number]]  (Normal) Collected: 05/05/25 0554    Specimen: Blood Updated: 05/05/25 0703     proBNP 576.0 pg/mL     Narrative:      This assay is used as an aid in the diagnosis of individuals suspected of having heart failure. It can be used as an aid in the diagnosis of acute decompensated heart failure (ADHF) in patients presenting with signs and symptoms of ADHF to the emergency department (ED). In addition, NT-proBNP of <300 pg/mL indicates ADHF is not likely.    Age Range Result Interpretation  NT-proBNP Concentration (pg/mL:      <50             Positive            >450                   Gray                 300-450                    Negative             <300    50-75           Positive            >900                  Gray                300-900                  Negative            <300      >75             Positive            >1800                  Gray                300-1800                  Negative            <300    Comprehensive Metabolic Panel [[id number]]  (Abnormal) Collected: 05/05/25 0554    " Specimen: Blood Updated: 05/05/25 0703     Glucose 80 mg/dL      BUN 32 mg/dL      Creatinine 1.42 mg/dL      Sodium 138 mmol/L      Potassium 4.4 mmol/L      Chloride 107 mmol/L      CO2 23.3 mmol/L      Calcium 8.0 mg/dL      Total Protein 5.6 g/dL      Albumin 2.6 g/dL      ALT (SGPT) 18 U/L      AST (SGOT) 13 U/L      Alkaline Phosphatase 63 U/L      Total Bilirubin 0.3 mg/dL      Globulin 3.0 gm/dL      A/G Ratio 0.9 g/dL      BUN/Creatinine Ratio 22.5     Anion Gap 7.7 mmol/L      eGFR 48.4 mL/min/1.73     Narrative:      GFR Categories in Chronic Kidney Disease (CKD)              GFR Category          GFR (mL/min/1.73)    Interpretation  G1                    90 or greater        Normal or high (1)  G2                    60-89                Mild decrease (1)  G3a                   45-59                Mild to moderate decrease  G3b                   30-44                Moderate to severe decrease  G4                    15-29                Severe decrease  G5                    14 or less           Kidney failure    (1)In the absence of evidence of kidney disease, neither GFR category G1 or G2 fulfill the criteria for CKD.    eGFR calculation 2021 CKD-EPI creatinine equation, which does not include race as a factor    Uric Acid [[id number]]  (Abnormal) Collected: 05/05/25 0554    Specimen: Blood Updated: 05/05/25 0703     Uric Acid 1.5 mg/dL     Magnesium [[id number]]  (Normal) Collected: 05/05/25 0554    Specimen: Blood Updated: 05/05/25 0703     Magnesium 2.4 mg/dL     Phosphorus [[id number]]  (Normal) Collected: 05/05/25 0554    Specimen: Blood Updated: 05/05/25 0703     Phosphorus 3.0 mg/dL     TSH [[id number]]  (Normal) Collected: 05/05/25 0554    Specimen: Blood Updated: 05/05/25 0703     TSH 1.530 uIU/mL     Lipid Panel [[id number]]  (Abnormal) Collected: 05/05/25 0554    Specimen: Blood Updated: 05/05/25 0703     Total Cholesterol 139 mg/dL      Triglycerides 117 mg/dL      HDL Cholesterol 29 mg/dL       LDL Cholesterol  88 mg/dL      VLDL Cholesterol 22 mg/dL      LDL/HDL Ratio 2.99    Narrative:      Cholesterol Reference Ranges  (U.S. Department of Health and Human Services ATP III Classifications)    Desirable          <200 mg/dL  Borderline High    200-239 mg/dL  High Risk          >240 mg/dL      Triglyceride Reference Ranges  (U.S. Department of Health and Human Services ATP III Classifications)    Normal           <150 mg/dL  Borderline High  150-199 mg/dL  High             200-499 mg/dL  Very High        >500 mg/dL    HDL Reference Ranges  (U.S. Department of Health and Human Services ATP III Classifications)    Low     <40 mg/dl (major risk factor for CHD)  High    >60 mg/dl ('negative' risk factor for CHD)        LDL Reference Ranges  (U.S. Department of Health and Human Services ATP III Classifications)    Optimal          <100 mg/dL  Near Optimal     100-129 mg/dL  Borderline High  130-159 mg/dL  High             160-189 mg/dL  Very High        >189 mg/dL    LDL is calculated using the NIH LDL-C calculation.      Hemoglobin A1c [[id number]]  (Abnormal) Collected: 05/05/25 0554    Specimen: Blood Updated: 05/05/25 0648     Hemoglobin A1C 7.30 %     Narrative:      Hemoglobin A1C Ranges:    Increased Risk for Diabetes  5.7% to 6.4%  Diabetes                     >= 6.5%  Diabetic Goal                < 7.0%    CBC & Differential [[id number]]  (Abnormal) Collected: 05/05/25 0554    Specimen: Blood Updated: 05/05/25 0636    Narrative:      The following orders were created for panel order CBC & Differential.  Procedure                               Abnormality         Status                     ---------                               -----------         ------                     CBC Auto Differential[[id number]]        Abnormal            Final result                 Please view results for these tests on the individual orders.    CBC Auto Differential [[id number]]  (Abnormal) Collected: 05/05/25 0554     Specimen: Blood Updated: 05/05/25 0636     WBC 5.93 10*3/mm3      RBC 3.17 10*6/mm3      Hemoglobin 10.3 g/dL      Hematocrit 30.7 %      MCV 96.8 fL      MCH 32.5 pg      MCHC 33.6 g/dL      RDW 12.7 %      RDW-SD 44.8 fl      MPV 9.6 fL      Platelets 146 10*3/mm3      Neutrophil % 57.1 %      Lymphocyte % 25.6 %      Monocyte % 12.6 %      Eosinophil % 2.7 %      Basophil % 0.8 %      Immature Grans % 1.2 %      Neutrophils, Absolute 3.38 10*3/mm3      Lymphocytes, Absolute 1.52 10*3/mm3      Monocytes, Absolute 0.75 10*3/mm3      Eosinophils, Absolute 0.16 10*3/mm3      Basophils, Absolute 0.05 10*3/mm3      Immature Grans, Absolute 0.07 10*3/mm3      nRBC 0.0 /100 WBC     Urine Culture - Urine, Urine, Clean Catch [[id number]]  (Abnormal)  (Susceptibility) Collected: 05/03/25 1541    Specimen: Urine, Clean Catch Updated: 05/05/25 0630     Urine Culture >100,000 CFU/mL Escherichia coli    Narrative:      Colonization of the urinary tract without infection is common. Treatment is discouraged unless the patient is symptomatic, pregnant, or undergoing an invasive urologic procedure.    Susceptibility        Escherichia coli      LA      Amoxicillin + Clavulanate Susceptible      Ampicillin Intermediate      Ampicillin + Sulbactam Susceptible      Cefazolin (Urine) Susceptible      Cefepime Susceptible      Ceftazidime Susceptible      Ceftriaxone Susceptible      Gentamicin Susceptible      Levofloxacin Susceptible      Nitrofurantoin Susceptible      Piperacillin + Tazobactam Susceptible      Trimethoprim + Sulfamethoxazole Susceptible                           POC Glucose Once [[id number]]  (Normal) Collected: 05/05/25 0525    Specimen: Blood Updated: 05/05/25 0527     Glucose 82 mg/dL     POC Glucose Once [[id number]]  (Abnormal) Collected: 05/04/25 2019    Specimen: Blood Updated: 05/04/25 2020     Glucose 153 mg/dL     POC Glucose Once [[id number]]  (Abnormal) Collected: 05/04/25 1552    Specimen: Blood Updated:  05/04/25 1553     Glucose 212 mg/dL     Basic Metabolic Panel [[id number]]  (Abnormal) Collected: 05/04/25 0531    Specimen: Blood Updated: 05/04/25 0737     Glucose 60 mg/dL      BUN 39 mg/dL      Creatinine 1.64 mg/dL      Sodium 137 mmol/L      Potassium 4.7 mmol/L      Comment: Slight hemolysis detected by analyzer. Result may be falsely elevated.        Chloride 104 mmol/L      CO2 23.0 mmol/L      Calcium 8.2 mg/dL      BUN/Creatinine Ratio 23.8     Anion Gap 10.0 mmol/L      eGFR 40.7 mL/min/1.73     Narrative:      GFR Categories in Chronic Kidney Disease (CKD)              GFR Category          GFR (mL/min/1.73)    Interpretation  G1                    90 or greater        Normal or high (1)  G2                    60-89                Mild decrease (1)  G3a                   45-59                Mild to moderate decrease  G3b                   30-44                Moderate to severe decrease  G4                    15-29                Severe decrease  G5                    14 or less           Kidney failure    (1)In the absence of evidence of kidney disease, neither GFR category G1 or G2 fulfill the criteria for CKD.    eGFR calculation 2021 CKD-EPI creatinine equation, which does not include race as a factor    CBC & Differential [[id number]]  (Abnormal) Collected: 05/04/25 0531    Specimen: Blood Updated: 05/04/25 0717    Narrative:      The following orders were created for panel order CBC & Differential.  Procedure                               Abnormality         Status                     ---------                               -----------         ------                     CBC Auto Differential[[id number]]        Abnormal            Final result                 Please view results for these tests on the individual orders.    CBC Auto Differential [[id number]]  (Abnormal) Collected: 05/04/25 0531    Specimen: Blood Updated: 05/04/25 0717     WBC 8.53 10*3/mm3      RBC 3.56 10*6/mm3      Hemoglobin 11.3  g/dL      Hematocrit 33.9 %      MCV 95.2 fL      MCH 31.7 pg      MCHC 33.3 g/dL      RDW 12.6 %      RDW-SD 43.7 fl      MPV 9.6 fL      Platelets 174 10*3/mm3      Neutrophil % 67.1 %      Lymphocyte % 18.8 %      Monocyte % 11.6 %      Eosinophil % 1.3 %      Basophil % 0.5 %      Immature Grans % 0.7 %      Neutrophils, Absolute 5.73 10*3/mm3      Lymphocytes, Absolute 1.60 10*3/mm3      Monocytes, Absolute 0.99 10*3/mm3      Eosinophils, Absolute 0.11 10*3/mm3      Basophils, Absolute 0.04 10*3/mm3      Immature Grans, Absolute 0.06 10*3/mm3      nRBC 0.0 /100 WBC     Basic Metabolic Panel [[id number]]  (Abnormal) Collected: 05/03/25 1708    Specimen: Blood Updated: 05/03/25 1742     Glucose 152 mg/dL      BUN 46 mg/dL      Creatinine 2.33 mg/dL      Sodium 136 mmol/L      Potassium 4.8 mmol/L      Chloride 98 mmol/L      CO2 24.0 mmol/L      Calcium 9.2 mg/dL      BUN/Creatinine Ratio 19.7     Anion Gap 14.0 mmol/L      eGFR 26.7 mL/min/1.73     Narrative:      GFR Categories in Chronic Kidney Disease (CKD)              GFR Category          GFR (mL/min/1.73)    Interpretation  G1                    90 or greater        Normal or high (1)  G2                    60-89                Mild decrease (1)  G3a                   45-59                Mild to moderate decrease  G3b                   30-44                Moderate to severe decrease  G4                    15-29                Severe decrease  G5                    14 or less           Kidney failure    (1)In the absence of evidence of kidney disease, neither GFR category G1 or G2 fulfill the criteria for CKD.    eGFR calculation 2021 CKD-EPI creatinine equation, which does not include race as a factor    CBC & Differential [[id number]]  (Abnormal) Collected: 05/03/25 1708    Specimen: Blood Updated: 05/03/25 1721    Narrative:      The following orders were created for panel order CBC & Differential.  Procedure                               Abnormality          Status                     ---------                               -----------         ------                     CBC Auto Differential[[id number]]        Abnormal            Final result                 Please view results for these tests on the individual orders.    CBC Auto Differential [[id number]]  (Abnormal) Collected: 05/03/25 1708    Specimen: Blood Updated: 05/03/25 1721     WBC 9.27 10*3/mm3      RBC 3.94 10*6/mm3      Hemoglobin 12.6 g/dL      Hematocrit 38.3 %      MCV 97.2 fL      MCH 32.0 pg      MCHC 32.9 g/dL      RDW 12.4 %      RDW-SD 44.0 fl      MPV 9.6 fL      Platelets 160 10*3/mm3      Neutrophil % 74.5 %      Lymphocyte % 13.9 %      Monocyte % 9.4 %      Eosinophil % 1.3 %      Basophil % 0.3 %      Immature Grans % 0.6 %      Neutrophils, Absolute 6.90 10*3/mm3      Lymphocytes, Absolute 1.29 10*3/mm3      Monocytes, Absolute 0.87 10*3/mm3      Eosinophils, Absolute 0.12 10*3/mm3      Basophils, Absolute 0.03 10*3/mm3      Immature Grans, Absolute 0.06 10*3/mm3      nRBC 0.0 /100 WBC     Urinalysis, Microscopic Only - Urine, Clean Catch [[id number]]  (Abnormal) Collected: 05/03/25 1541    Specimen: Urine, Clean Catch Updated: 05/03/25 1619     RBC, UA Too Numerous to Count /HPF      WBC, UA Too Numerous to Count /HPF      Bacteria, UA 4+ /HPF      Squamous Epithelial Cells, UA 0-2 /HPF      Hyaline Casts, UA 0-2 /LPF      Methodology Automated Microscopy    Urinalysis With Microscopic If Indicated (No Culture) - Urine, Clean Catch [[id number]]  (Abnormal) Collected: 05/03/25 1541    Specimen: Urine, Clean Catch Updated: 05/03/25 1559     Color, UA Orange     Comment: Any Substance that causes an abnormal urine color can alter the accuracy of the chemical reactions.        Appearance, UA Turbid     pH, UA <=5.0     Specific Gravity, UA 1.014     Glucose,  mg/dL (2+)     Ketones, UA Negative     Bilirubin, UA Negative     Blood, UA Large (3+)     Protein,  mg/dL (2+)     Leuk  Esterase, UA Large (3+)     Nitrite, UA Positive     Urobilinogen, UA 0.2 E.U./dL            Imaging Results (All)       Procedure Component Value Units Date/Time    CT Abdomen Pelvis Without Contrast [[id number]] Collected: 05/03/25 1737     Updated: 05/03/25 1754    Narrative:      CT ABDOMEN AND PELVIS WITHOUT CONTRAST:     HISTORY: uti, eval for stone; N30.90-Cystitis, unspecified without  hematuria     TECHNIQUE: Helical CT was performed of the abdomen and pelvis from the  lung bases through the lesser trochanters without IV contrast.  Reformatted images were provided in the coronal and sagittal planes.  Radiation dose reduction techniques were utilized, including automated  exposure control, and exposure modulation based on body size.     COMPARISON: CT abdomen/pelvis 1/20/2020     FINDINGS:     Lung bases: Visualized lung bases are unremarkable.     Abdomen: The liver and gallbladder are normal.  The spleen and pancreas  appear normal.  Both adrenal glands are normal. There is no evidence of  bowel obstruction. There is no evidence of free intraperitoneal air or  abnormal intra-abdominal fluid collection, adenopathy or other mass. The  aorta is normal in caliber.       There is mild bilateral hydronephrosis. There is no evidence of  nephrolithiasis or ureterolithiasis on either side. The urinary bladder  is distended and there is wall thickening with pneumatosis.     Pelvis: There is no pelvic adenopathy or soft tissue mass. In addition  to urinary bladder pneumatosis, there appears to be air in a few  adjacent pelvic veins. There is no CT evidence of hernia or bowel  obstruction.     There are spinal degenerative changes, but there is no acute bony  abnormality.       Impression:         Emphysematous cystitis, urinary bladder distention with pneumatosis of  the bladder wall, as well as involving a few small pelvic veins adjacent  to the bladder base, and there is mild bilateral secondary  hydronephrosis.      CALL REPORT :  Results of the exam were discussed with Dr. Lee, the  physician caring for the patient in the emergency room at the time of  this dictation.              This report was finalized on 5/3/2025 5:51 PM by Dr. Bassem Haynes M.D  on Workstation: [id number]                 Assessment & Plan   Urinary retention      Emphysematous cystitis        Plan   - recommend keeping indwelling catheter at discharge  - continue antibiotics. Recommend a 14 day course. Plan for Bactrim DS bid at the time of discharge.  - safe for discharge at any time from urology perspective    Mao Dumas Jr., MD  05/05/25  08:04 EDT

## 2025-05-05 NOTE — PLAN OF CARE
Goal Outcome Evaluation:  Plan of Care Reviewed With: patient        Progress: improving     AO x4, SR, RA, Assist x1. No c/o pain. Morrell color remained red through shift, output was steady.

## 2025-05-06 ENCOUNTER — APPOINTMENT (OUTPATIENT)
Dept: GENERAL RADIOLOGY | Facility: HOSPITAL | Age: 85
DRG: 690 | End: 2025-05-06
Payer: MEDICARE

## 2025-05-06 LAB
ANION GAP SERPL CALCULATED.3IONS-SCNC: 8.7 MMOL/L (ref 5–15)
BASOPHILS # BLD AUTO: 0.03 10*3/MM3 (ref 0–0.2)
BASOPHILS NFR BLD AUTO: 0.4 % (ref 0–1.5)
BUN SERPL-MCNC: 22 MG/DL (ref 8–23)
BUN/CREAT SERPL: 18.3 (ref 7–25)
CALCIUM SPEC-SCNC: 8.2 MG/DL (ref 8.6–10.5)
CHLORIDE SERPL-SCNC: 107 MMOL/L (ref 98–107)
CO2 SERPL-SCNC: 21.3 MMOL/L (ref 22–29)
CREAT SERPL-MCNC: 1.2 MG/DL (ref 0.76–1.27)
DEPRECATED RDW RBC AUTO: 45.8 FL (ref 37–54)
EGFRCR SERPLBLD CKD-EPI 2021: 59.3 ML/MIN/1.73
EOSINOPHIL # BLD AUTO: 0.11 10*3/MM3 (ref 0–0.4)
EOSINOPHIL NFR BLD AUTO: 1.5 % (ref 0.3–6.2)
ERYTHROCYTE [DISTWIDTH] IN BLOOD BY AUTOMATED COUNT: 12.8 % (ref 12.3–15.4)
ERYTHROCYTE [SEDIMENTATION RATE] IN BLOOD: 65 MM/HR (ref 0–20)
GLUCOSE BLDC GLUCOMTR-MCNC: 121 MG/DL (ref 70–130)
GLUCOSE BLDC GLUCOMTR-MCNC: 128 MG/DL (ref 70–130)
GLUCOSE BLDC GLUCOMTR-MCNC: 173 MG/DL (ref 70–130)
GLUCOSE BLDC GLUCOMTR-MCNC: 291 MG/DL (ref 70–130)
GLUCOSE SERPL-MCNC: 95 MG/DL (ref 65–99)
HCT VFR BLD AUTO: 31.5 % (ref 37.5–51)
HGB BLD-MCNC: 10.2 G/DL (ref 13–17.7)
IMM GRANULOCYTES # BLD AUTO: 0.18 10*3/MM3 (ref 0–0.05)
IMM GRANULOCYTES NFR BLD AUTO: 2.5 % (ref 0–0.5)
LYMPHOCYTES # BLD AUTO: 1.35 10*3/MM3 (ref 0.7–3.1)
LYMPHOCYTES NFR BLD AUTO: 18.9 % (ref 19.6–45.3)
MCH RBC QN AUTO: 31.9 PG (ref 26.6–33)
MCHC RBC AUTO-ENTMCNC: 32.4 G/DL (ref 31.5–35.7)
MCV RBC AUTO: 98.4 FL (ref 79–97)
MONOCYTES # BLD AUTO: 1.11 10*3/MM3 (ref 0.1–0.9)
MONOCYTES NFR BLD AUTO: 15.5 % (ref 5–12)
NEUTROPHILS NFR BLD AUTO: 4.37 10*3/MM3 (ref 1.7–7)
NEUTROPHILS NFR BLD AUTO: 61.2 % (ref 42.7–76)
NRBC BLD AUTO-RTO: 0 /100 WBC (ref 0–0.2)
PLATELET # BLD AUTO: 165 10*3/MM3 (ref 140–450)
PMV BLD AUTO: 9.8 FL (ref 6–12)
POTASSIUM SERPL-SCNC: 4.5 MMOL/L (ref 3.5–5.2)
RBC # BLD AUTO: 3.2 10*6/MM3 (ref 4.14–5.8)
SODIUM SERPL-SCNC: 137 MMOL/L (ref 136–145)
URATE SERPL-MCNC: 1.4 MG/DL (ref 3.4–7)
WBC NRBC COR # BLD AUTO: 7.15 10*3/MM3 (ref 3.4–10.8)

## 2025-05-06 PROCEDURE — 25810000003 SODIUM CHLORIDE 0.9 % SOLUTION: Performed by: HOSPITALIST

## 2025-05-06 PROCEDURE — 85025 COMPLETE CBC W/AUTO DIFF WBC: CPT | Performed by: HOSPITALIST

## 2025-05-06 PROCEDURE — 97162 PT EVAL MOD COMPLEX 30 MIN: CPT

## 2025-05-06 PROCEDURE — 97530 THERAPEUTIC ACTIVITIES: CPT

## 2025-05-06 PROCEDURE — 63710000001 PREDNISONE PER 1 MG: Performed by: HOSPITALIST

## 2025-05-06 PROCEDURE — 73560 X-RAY EXAM OF KNEE 1 OR 2: CPT

## 2025-05-06 PROCEDURE — 80048 BASIC METABOLIC PNL TOTAL CA: CPT | Performed by: HOSPITALIST

## 2025-05-06 PROCEDURE — 63710000001 INSULIN LISPRO (HUMAN) PER 5 UNITS: Performed by: HOSPITALIST

## 2025-05-06 PROCEDURE — 99223 1ST HOSP IP/OBS HIGH 75: CPT | Performed by: ORTHOPAEDIC SURGERY

## 2025-05-06 PROCEDURE — 97166 OT EVAL MOD COMPLEX 45 MIN: CPT

## 2025-05-06 PROCEDURE — 85652 RBC SED RATE AUTOMATED: CPT | Performed by: ORTHOPAEDIC SURGERY

## 2025-05-06 PROCEDURE — 82948 REAGENT STRIP/BLOOD GLUCOSE: CPT

## 2025-05-06 PROCEDURE — 25010000002 CEFTRIAXONE PER 250 MG: Performed by: HOSPITALIST

## 2025-05-06 PROCEDURE — 84550 ASSAY OF BLOOD/URIC ACID: CPT | Performed by: HOSPITALIST

## 2025-05-06 RX ORDER — PREDNISONE 20 MG/1
40 TABLET ORAL
Status: DISCONTINUED | OUTPATIENT
Start: 2025-05-07 | End: 2025-05-06

## 2025-05-06 RX ORDER — PREDNISONE 20 MG/1
60 TABLET ORAL ONCE
Status: COMPLETED | OUTPATIENT
Start: 2025-05-06 | End: 2025-05-06

## 2025-05-06 RX ORDER — HYDROCODONE BITARTRATE AND ACETAMINOPHEN 5; 325 MG/1; MG/1
1 TABLET ORAL EVERY 4 HOURS PRN
Status: DISCONTINUED | OUTPATIENT
Start: 2025-05-06 | End: 2025-05-11

## 2025-05-06 RX ADMIN — INSULIN LISPRO 4 UNITS: 100 INJECTION, SOLUTION INTRAVENOUS; SUBCUTANEOUS at 22:07

## 2025-05-06 RX ADMIN — CLONIDINE HYDROCHLORIDE 0.1 MG: 0.1 TABLET ORAL at 15:42

## 2025-05-06 RX ADMIN — FAMOTIDINE 20 MG: 20 TABLET, FILM COATED ORAL at 10:09

## 2025-05-06 RX ADMIN — ATORVASTATIN CALCIUM 40 MG: 20 TABLET, FILM COATED ORAL at 21:48

## 2025-05-06 RX ADMIN — OXYCODONE HYDROCHLORIDE AND ACETAMINOPHEN 500 MG: 500 TABLET ORAL at 10:09

## 2025-05-06 RX ADMIN — SODIUM CHLORIDE 75 ML/HR: 9 INJECTION, SOLUTION INTRAVENOUS at 04:56

## 2025-05-06 RX ADMIN — PREDNISONE 60 MG: 20 TABLET ORAL at 15:42

## 2025-05-06 RX ADMIN — CLONIDINE HYDROCHLORIDE 0.1 MG: 0.1 TABLET ORAL at 21:48

## 2025-05-06 RX ADMIN — SODIUM CHLORIDE 1000 MG: 9 INJECTION, SOLUTION INTRAVENOUS at 21:48

## 2025-05-06 RX ADMIN — GABAPENTIN 400 MG: 400 CAPSULE ORAL at 21:48

## 2025-05-06 RX ADMIN — Medication 1000 UNITS: at 10:09

## 2025-05-06 RX ADMIN — CARVEDILOL 6.25 MG: 6.25 TABLET, FILM COATED ORAL at 10:09

## 2025-05-06 RX ADMIN — HYDROCODONE BITARTRATE AND ACETAMINOPHEN 1 TABLET: 5; 325 TABLET ORAL at 17:32

## 2025-05-06 RX ADMIN — GABAPENTIN 400 MG: 400 CAPSULE ORAL at 10:09

## 2025-05-06 RX ADMIN — CARVEDILOL 6.25 MG: 6.25 TABLET, FILM COATED ORAL at 17:32

## 2025-05-06 RX ADMIN — CLONIDINE HYDROCHLORIDE 0.1 MG: 0.1 TABLET ORAL at 05:02

## 2025-05-06 RX ADMIN — FAMOTIDINE 20 MG: 20 TABLET, FILM COATED ORAL at 21:48

## 2025-05-06 RX ADMIN — TAMSULOSIN HYDROCHLORIDE 0.8 MG: 0.4 CAPSULE ORAL at 10:09

## 2025-05-06 RX ADMIN — GABAPENTIN 400 MG: 400 CAPSULE ORAL at 15:42

## 2025-05-06 RX ADMIN — INSULIN LISPRO 2 UNITS: 100 INJECTION, SOLUTION INTRAVENOUS; SUBCUTANEOUS at 17:32

## 2025-05-06 NOTE — NURSING NOTE
Dr. Paul was contacted via his personal phone for orthopedic consult and voicemail with no patient information was left.       Dr. Paul returned phone call and will see patient. Consult call completed.

## 2025-05-06 NOTE — DISCHARGE PLACEMENT REQUEST
"Flo Ramírez \"GENE\" (84 y.o. Male)       Date of Birth   1940    Social Security Number       Address   [address] [place] [address]    Home Phone   [phone]    MRN   [id number]       [employer]   None    Marital Status                               Admission Date   5/3/2025    Admission Type   Emergency    Admitting Provider   Ochoa Flores MD    Attending Provider   Ochoa Flores MD    Department, Room/Bed   [address], E563/1       Discharge Date       Discharge Disposition       Discharge Destination                                 Attending Provider: Ochoa Flores MD    Allergies: Angiotensin Receptor Blockers    Isolation: None   Infection: None   Code Status: CPR    Ht: 182.9 cm (72\")   Wt: 80.9 kg (178 lb 5.6 oz)    Admission Cmt: None   Principal Problem: Emphysematous cystitis [N30.80]                   Active Insurance as of 5/3/2025       Primary Coverage       Payor Plan Insurance Group Employer/Plan Group    MEDICARE MEDICARE A & B        Payor Plan Address Payor Plan Phone Number Payor Plan Fax Number Effective Dates    [address] [phone]  9/1/2004 - None Entered    [facility] [id number]         Subscriber Name Subscriber Birth Date Member ID       FLO RAMÍREZ 1940 [id number]               Secondary Coverage       Payor Plan Insurance Group Employer/Plan Group    [employer] HE[employer] C[employer] OPTIONS        Payor Plan Address Payor Plan Phone Number Payor Plan Fax Number Effective Dates    [facility] [phone]  1/1/2016 - None Entered    [address]       [facility] [address]         Subscriber Name Subscriber Birth Date Member ID       FLO RAMÍREZ 1940 [id number]                     Emergency Contacts        (Rel.) Home Phone Work Phone Mobile Phone    FLO RAMÍREZ \"TUYET\" (Son) -- -- [phone]          "

## 2025-05-06 NOTE — PLAN OF CARE
Goal Outcome Evaluation:  VSS, RA, A&Ox4. XR of L knee take today showing moderate effusion. STAT aspiration of knee placed however will not be completed until 5/7/25 d/t no radiologist currently on. MD notified. NPO at midnight. Pain meds controlling pain. CTM

## 2025-05-06 NOTE — THERAPY EVALUATION
Patient Name: Vj Bright  : 1940    MRN: [id number]                              Today's Date: 2025       Admit Date: 5/3/2025    Visit Dx:     ICD-10-CM ICD-9-CM   1. Emphysematous cystitis  N30.80 595.89   2. Acute urinary retention  R33.8 788.29   3. JUDY (acute kidney injury)  N17.9 584.9     Patient Active Problem List   Diagnosis    S/P AVR (aortic valve replacement)    S/P MVR (mitral valve repair)    Coronary artery disease involving native coronary artery of native heart without angina pectoris    Essential hypertension    Arthritis of left knee    Disorder of aorta    Knee effusion    Knee hemarthrosis, left    Knee pain, left    Paroxysmal atrial fibrillation    ST elevation myocardial infarction (STEMI)    History of ST elevation myocardial infarction (STEMI)    Emphysematous cystitis     Past Medical History:   Diagnosis Date    Acute respiratory failure     Aortic stenosis     BPH (benign prostatic hyperplasia)     CAD (coronary artery disease)     Chest pain     CHF (congestive heart failure)     Diabetes mellitus     Gout     Hyperlipidemia     Hypertension     PAF (paroxysmal atrial fibrillation)     Pulmonary hypertension     STEMI (ST elevation myocardial infarction)      Past Surgical History:   Procedure Laterality Date    AORTIC VALVE REPAIR/REPLACEMENT      CARDIAC SURGERY      CARDIAC SURGERY      CORONARY ANGIOPLASTY WITH STENT PLACEMENT      MITRAL VALVE REPAIR/REPLACEMENT      OTHER SURGICAL HISTORY      AORTIC VALVE REPAIR OF SUBVALVULAR AORTIC STENOSIS    OTHER SURGICAL HISTORY      CARDIAC CATH PROCEDURE OUTCOME: SUCCESSFUL    OTHER SURGICAL HISTORY      VENOUS THROMBECTOMY BY COMBINED LEG INCISION      General Information       Row Name 25 1207          OT Time and Intention    Subjective Information complains of;pain  L knee  -CE     Document Type evaluation  -CE     Mode of Treatment physical therapy;occupational therapy  -CE     Patient Effort good  -CE      Symptoms Noted During/After Treatment increased pain  -CE     Comment with any L knee movement  -CE       Row Name 05/06/25 1207          General Information    Patient Profile Reviewed yes  -CE     Existing Precautions/Restrictions fall  -CE     Barriers to Rehab none identified  -CE       Row Name 05/06/25 1207          Living Environment    Current Living Arrangements home  -CE     People in Home alone  -CE       Row Name 05/06/25 1207          Home Main Entrance    Number of Stairs, Main Entrance three  -CE       Row Name 05/06/25 1207          Stairs Within Home, Primary    Stairs, Within Home, Primary flight of stairs to basement where laundry is located  -CE     Number of Stairs, Within Home, Primary other (see comments)  flight of steps  -CE       Row Name 05/06/25 1207          Cognition    Orientation Status (Cognition) oriented x 4  -CE       Row Name 05/06/25 1207          Safety Issues/Impairments Affecting Functional Mobility    Impairments Affecting Function (Mobility) endurance/activity tolerance;pain;range of motion (ROM);strength  -CE               User Key  (r) = Recorded By, (t) = Taken By, (c) = Cosigned By      Initials Name Provider Type    CE Arin Ruiz OT Occupational Therapist                     Mobility/ADL's       Row Name 05/06/25 1211          Bed Mobility    Bed Mobility supine-sit;sit-supine  -CE     Supine-Sit [place] (Bed Mobility) 2 person assist;minimum assist (75% patient effort)  -CE     Sit-Supine [place] (Bed Mobility) 2 person assist;moderate assist (50% patient effort)  -CE     Assistive Device (Bed Mobility) bed rails;head of bed elevated  -CE     Comment, (Bed Mobility) increased time  -CE       Row Name 05/06/25 1211          Transfers    Transfers sit-stand transfer  -CE     Comment, (Transfers) attempted to maintain LLE NWB to prevent further pain  -CE       Row Name 05/06/25 1211          Sit-Stand Transfer    Sit-Stand [place] (Transfers)  moderate assist (50% patient effort);2 person assist  -CE     Assistive Device (Sit-Stand Transfers) walker, front-wheeled  -CE       Row Name 05/06/25 1211          Functional Mobility    Functional Mobility- Comment pt unable to tolerate transfers at this time 2/2 pain in L knee  -CE       Row Name 05/06/25 1211          Activities of Daily Living    BADL Assessment/Intervention lower body dressing  -CE       Row Name 05/06/25 1211          Lower Body Dressing Assessment/Training    [place] Level (Lower Body Dressing) don;socks;dependent (less than 25% patient effort)  -CE     Position (Lower Body Dressing) sitting up in bed  -CE               User Key  (r) = Recorded By, (t) = Taken By, (c) = Cosigned By      Initials Name Provider Type    Arin Salinas OT Occupational Therapist                   Obj/Interventions       Row Name 05/06/25 1219          Sensory Assessment (Somatosensory)    Sensory Assessment (Somatosensory) sensation intact  -[facility] Name 05/06/25 1219          Vision Assessment/Intervention    Visual Impairment/Limitations WFL;corrective lenses for reading  -[facility] Name 05/06/25 1219          Range of Motion Comprehensive    Comment, General Range of Motion L knee limited to about 60 deg flexion; otherwise ROM WFL throughout  -CE       Row Name 05/06/25 1219          Strength Comprehensive (MMT)    Comment, General Manual Muscle Testing (MMT) Assessment BUE and RLE >/= 3+/5 ; NT LLE 2/2 pain  -CE               User Key  (r) = Recorded By, (t) = Taken By, (c) = Cosigned By      Initials Name Provider Type    Arin Salinas OT Occupational Therapist                   Goals/Plan       Row Name 05/06/25 1225          Bed Mobility Goal 1 (OT)    Activity/Assistive Device (Bed Mobility Goal 1, OT) bed mobility activities, all  -CE     [place] Level/Cues Needed (Bed Mobility Goal 1, OT) modified independence  -CE     Time Frame (Bed Mobility Goal 1, OT) short term  goal (STG);2 weeks  -CE       Row Name 05/06/25 1225          Transfer Goal 1 (OT)    Activity/Assistive Device (Transfer Goal 1, OT) transfers, all  -CE     [place] Level/Cues Needed (Transfer Goal 1, OT) modified independence  -CE     Time Frame (Transfer Goal 1, OT) short term goal (STG);2 weeks  -CE       Row Name 05/06/25 1225          Bathing Goal 1 (OT)    Activity/Device (Bathing Goal 1, OT) bathing skills, all  -CE     [place] Level/Cues Needed (Bathing Goal 1, OT) modified independence  -CE     Time Frame (Bathing Goal 1, OT) short term goal (STG);2 weeks  -CE       Row Name 05/06/25 1225          Dressing Goal 1 (OT)    Activity/Device (Dressing Goal 1, OT) dressing skills, all  -CE     [place]/Cues Needed (Dressing Goal 1, OT) modified independence  -CE     Time Frame (Dressing Goal 1, OT) short term goal (STG);2 weeks  -CE       Row Name 05/06/25 1225          Toileting Goal 1 (OT)    Activity/Device (Toileting Goal 1, OT) toileting skills, all  -CE     [place] Level/Cues Needed (Toileting Goal 1, OT) modified independence  -CE     Time Frame (Toileting Goal 1, OT) short term goal (STG);2 weeks  -CE       Row Name 05/06/25 1225          Strength Goal 1 (OT)    Strength Goal 1 (OT) Pt will be independent with HEP focusing on increasing strength in prep for I/ADLs.  -CE       Row Name 05/06/25 1225          Therapy Assessment/Plan (OT)    Planned Therapy Interventions (OT) activity tolerance training;adaptive equipment training;BADL retraining;functional balance retraining;patient/caregiver education/training;strengthening exercise;transfer/mobility retraining  -CE               User Key  (r) = Recorded By, (t) = Taken By, (c) = Cosigned By      Initials Name Provider Type    CE Arin Ruiz, OT Occupational Therapist                   Clinical Impression       Row Name 05/06/25 1220          Pain Assessment    Pretreatment Pain Rating 10/10  -CE     Posttreatment Pain Rating  10/10  -CE     Pain Location knee  -CE     Pain Side/Orientation left  -CE     Response to Pain Interventions activity participation with increased pain  -CE       Row Name 05/06/25 1220          Plan of Care Review    Plan of Care Reviewed With patient  -CE     Outcome Evaluation Pt seen for OT eval after admission 2/2 dysuria and found to have emphysematous cystitis and bilateral hydronephrosis. Pt reporting he lives alone and is independent with all I/ADLs at baseline. Pt agreeable to attempt mobilization but limited by L knee pain with any movement. Pt requiring [place] x 2 for sup>sit and modA x 2 for attempt to stand. L knee exquisitely tender to touch and RN aware. OT will con't to follow for stated goals and recommend d/c to IPR vs. home with assist pending further progress with mobility and pain control.  -CE       Row Name 05/06/25 1220          Therapy Assessment/Plan (OT)    Rehab Potential (OT) good  -CE     Criteria for Skilled Therapeutic Interventions Met (OT) yes  -CE     Therapy Frequency (OT) 3 times/wk  -CE       Row Name 05/06/25 1220          Therapy Plan Review/Discharge Plan (OT)    Anticipated Discharge Disposition (OT) home with assist;inpatient rehabilitation facility  -CE       Row Name 05/06/25 1220          Vital Signs    O2 Delivery Pre Treatment room air  -CE     O2 Delivery Intra Treatment room air  -CE     O2 Delivery Post Treatment room air  -CE     Pre Patient Position Supine  -CE     Intra Patient Position Standing  -CE     Post Patient Position Supine  -CE       Row Name 05/06/25 1220          Positioning and Restraints    Pre-Treatment Position in bed  -CE     Post Treatment Position bed  -CE     In Bed notified nsg;supine;fowlers;call light within reach;encouraged to call for assist;exit alarm on  -CE               User Key  (r) = Recorded By, (t) = Taken By, (c) = Cosigned By      Initials Name Provider Type    CE Arin Ruiz, OT Occupational Therapist                    Outcome Measures       Row Name 05/06/25 1226          How much help from another is currently needed...    Putting on and taking off regular lower body clothing? 2  -CE     Bathing (including washing, rinsing, and drying) 2  -CE     Toileting (which includes using toilet bed pan or urinal) 2  -CE     Putting on and taking off regular upper body clothing 3  -CE     Taking care of personal grooming (such as brushing teeth) 3  -CE     Eating meals 4  -CE     AM-PAC 6 Clicks Score (OT) 16  -CE       Row Name 05/06/25 1226          Functional Assessment    Outcome Measure Options AM-PAC 6 Clicks Daily Activity (OT)  -CE               User Key  (r) = Recorded By, (t) = Taken By, (c) = Cosigned By      Initials Name Provider Type    CE Arin Ruiz OT Occupational Therapist                    Occupational Therapy Education       Title: PT OT SLP Therapies (Done)       Topic: Occupational Therapy (Done)       Point: ADL training (Done)       Learning Progress Summary            Patient Acceptance, E, VU by CE at 5/6/2025 1227                      Point: Precautions (Done)       Learning Progress Summary            Patient Acceptance, E, VU by CE at 5/6/2025 1227                                      User Key       Initials Effective Dates Name Provider Type Discipline    CE 10/17/22 -  Arin Ruiz OT Occupational Therapist OT                  OT Recommendation and Plan  Planned Therapy Interventions (OT): activity tolerance training, adaptive equipment training, BADL retraining, functional balance retraining, patient/caregiver education/training, strengthening exercise, transfer/mobility retraining  Therapy Frequency (OT): 3 times/wk  Plan of Care Review  Plan of Care Reviewed With: patient  Outcome Evaluation: Pt seen for OT eval after admission 2/2 dysuria and found to have emphysematous cystitis and bilateral hydronephrosis. Pt reporting he lives alone and is independent with all I/ADLs at baseline. Pt  agreeable to attempt mobilization but limited by L knee pain with any movement. Pt requiring [place] x 2 for sup>sit and modA x 2 for attempt to stand. L knee exquisitely tender to touch and RN aware. OT will con't to follow for stated goals and recommend d/c to IPR vs. home with assist pending further progress with mobility and pain control.     Time Calculation:   Evaluation Complexity (OT)  Review Occupational Profile/Medical/Therapy History Complexity: expanded/moderate complexity  Assessment, Occupational Performance/Identification of Deficit Complexity: 3-5 performance deficits  Clinical Decision Making Complexity (OT): detailed assessment/moderate complexity  Overall Complexity of Evaluation (OT): moderate complexity     Time Calculation- OT       Row Name 05/06/25 1227             Time Calculation- OT    OT Start Time 1027  -CE      OT Stop Time 1051  -CE      OT Time Calculation (min) 24 min  -CE      OT Received On 05/06/25  -CE      OT - Next Appointment 05/08/25  -CE      OT Goal Re-Cert Due Date 05/20/25  -CE                User Key  (r) = Recorded By, (t) = Taken By, (c) = Cosigned By      Initials Name Provider Type    Arin Salinas OT Occupational Therapist                  Therapy Charges for Today       Code Description Service Date Service Provider Modifiers Qty    73450817166 HC OT EVAL MOD COMPLEXITY 3 5/6/2025 Arin Ruiz OT GO 1                 Arin Ruiz OT  5/6/2025

## 2025-05-06 NOTE — PLAN OF CARE
Goal Outcome Evaluation:  Plan of Care Reviewed With: patient           Outcome Evaluation: Pt seen for OT eval after admission 2/2 dysuria and found to have emphysematous cystitis and bilateral hydronephrosis. Pt reporting he lives alone and is independent with all I/ADLs at baseline. Pt agreeable to attempt mobilization but limited by L knee pain with any movement. Pt requiring [place] x 2 for sup>sit and modA x 2 for attempt to stand. L knee exquisitely tender to touch and RN aware. OT will con't to follow for stated goals and recommend d/c to IPR vs. home with assist pending further progress with mobility and pain control.    Anticipated Discharge Disposition (OT): home with assist, inpatient rehabilitation facility

## 2025-05-06 NOTE — PROGRESS NOTES
"Daily progress note    Primary care physician      Subjective  Doing better with no new complaints except for left knee pain    History of present illness  84-year-old -American male with history of aortic stenosis coronary artery disease congestive heart failure diabetes hypertension hyperlipidemia atrial fibrillation pulmonary hypertension and chronic kidney disease presented to [facility] emergency room with dysuria for last 1 week which is getting worse.  Patient also have lower abdominal pain with nausea but no vomiting diarrhea.  Patient workup in ER revealed emphysematous cystitis with acute kidney injury and urine retention with bilateral hydronephrosis admitted for management.  Patient has no chest pain shortness of breath palpitation.     REVIEW OF SYSTEMS  Unremarkable except left knee pain     PHYSICAL EXAM  Blood pressure 138/76, pulse 73, temperature 97.9 °F (36.6 °C), temperature source Oral, resp. rate 16, height 182.9 cm (72\"), weight 80.9 kg (178 lb 5.6 oz), SpO2 96%.    GENERAL: Awake and alert no acute distress  HENT: nares patent  EYES: no scleral icterus  CV: regular rhythm, normal rate  RESPIRATORY: normal effort and moving air bilaterally  ABDOMEN: soft, suprapubic tenderness, bowel sounds positive  MUSCULOSKELETAL: no deformity  NEURO: alert, moves all extremities, follows commands  PSYCH:  calm, cooperative  SKIN: warm, dry     LAB RESULTS  Lab Results (last 24 hours)       Procedure Component Value Units Date/Time    POC Glucose Once [[id number]]  (Normal) Collected: 05/06/25 1113    Specimen: Blood Updated: 05/06/25 1115     Glucose 128 mg/dL     Uric Acid [[id number]]  (Abnormal) Collected: 05/06/25 0523    Specimen: Blood Updated: 05/06/25 0740     Uric Acid 1.4 mg/dL     Basic Metabolic Panel [[id number]]  (Abnormal) Collected: 05/06/25 0523    Specimen: Blood Updated: 05/06/25 0708     Glucose 95 mg/dL      BUN 22 mg/dL      Creatinine 1.20 mg/dL      Sodium " 137 mmol/L      Potassium 4.5 mmol/L      Chloride 107 mmol/L      CO2 21.3 mmol/L      Calcium 8.2 mg/dL      BUN/Creatinine Ratio 18.3     Anion Gap 8.7 mmol/L      eGFR 59.3 mL/min/1.73     Narrative:      GFR Categories in Chronic Kidney Disease (CKD)              GFR Category          GFR (mL/min/1.73)    Interpretation  G1                    90 or greater        Normal or high (1)  G2                    60-89                Mild decrease (1)  G3a                   45-59                Mild to moderate decrease  G3b                   30-44                Moderate to severe decrease  G4                    15-29                Severe decrease  G5                    14 or less           Kidney failure    (1)In the absence of evidence of kidney disease, neither GFR category G1 or G2 fulfill the criteria for CKD.    eGFR calculation 2021 CKD-EPI creatinine equation, which does not include race as a factor    CBC & Differential [[id number]]  (Abnormal) Collected: 05/06/25 0523    Specimen: Blood Updated: 05/06/25 0656    Narrative:      The following orders were created for panel order CBC & Differential.  Procedure                               Abnormality         Status                     ---------                               -----------         ------                     CBC Auto Differential[[id number]]        Abnormal            Final result                 Please view results for these tests on the individual orders.    CBC Auto Differential [[id number]]  (Abnormal) Collected: 05/06/25 0523    Specimen: Blood Updated: 05/06/25 0656     WBC 7.15 10*3/mm3      RBC 3.20 10*6/mm3      Hemoglobin 10.2 g/dL      Hematocrit 31.5 %      MCV 98.4 fL      MCH 31.9 pg      MCHC 32.4 g/dL      RDW 12.8 %      RDW-SD 45.8 fl      MPV 9.8 fL      Platelets 165 10*3/mm3      Neutrophil % 61.2 %      Lymphocyte % 18.9 %      Monocyte % 15.5 %      Eosinophil % 1.5 %      Basophil % 0.4 %      Immature Grans % 2.5 %       Neutrophils, Absolute 4.37 10*3/mm3      Lymphocytes, Absolute 1.35 10*3/mm3      Monocytes, Absolute 1.11 10*3/mm3      Eosinophils, Absolute 0.11 10*3/mm3      Basophils, Absolute 0.03 10*3/mm3      Immature Grans, Absolute 0.18 10*3/mm3      nRBC 0.0 /100 WBC     POC Glucose Once [[id number]]  (Normal) Collected: 05/06/25 0551    Specimen: Blood Updated: 05/06/25 0553     Glucose 121 mg/dL     POC Glucose Once [[id number]]  (Abnormal) Collected: 05/05/25 2115    Specimen: Blood Updated: 05/05/25 2116     Glucose 235 mg/dL     POC Glucose Once [[id number]]  (Abnormal) Collected: 05/05/25 1627    Specimen: Blood Updated: 05/05/25 1629     Glucose 152 mg/dL           Imaging Results (Last 24 Hours)       Procedure Component Value Units Date/Time    XR Knee 1 or 2 View Left [[id number]] Collected: 05/06/25 1042     Updated: 05/06/25 1046    Narrative:      XR KNEE 1 OR 2 VW LEFT-     INDICATIONS: Pain     TECHNIQUE: 3 VIEWS OF THE LEFT KNEE     COMPARISON: None available     FINDINGS:     Moderate knee effusion is noted. No acute fracture, erosion, or  dislocation is identified. Mild degenerative spurring is present.  Chondral and arterial calcifications are seen. Follow-up/further  evaluation can be obtained as indications persist.       Impression:         As described.           This report was finalized on 5/6/2025 10:43 AM by Dr. Jass Groves M.D on Workstation: [id number]               Current Facility-Administered Medications:     ascorbic acid (VITAMIN C) tablet 500 mg, 500 mg, Oral, Daily, Ochoa Flores MD, 500 mg at 05/06/25 1009    atorvastatin (LIPITOR) tablet 40 mg, 40 mg, Oral, Nightly, Ochoa Flores MD, 40 mg at 05/05/25 2025    carvedilol (COREG) tablet 6.25 mg, 6.25 mg, Oral, BID With Meals, Ochoa Flores MD, 6.25 mg at 05/06/25 1009    cefTRIAXone (ROCEPHIN) 1,000 mg in sodium chloride 0.9 % 100 mL MBP, 1,000 mg, Intravenous, Q24H, Ochoa Flores MD, Last Rate: 200 mL/hr at 05/05/25 2118, 1,000 mg at  05/05/25 2118    cholecalciferol (VITAMIN D3) tablet, , Oral, Daily, Olga Lidia Flores MD, 1,000 Units at 05/06/25 1009    cloNIDine (CATAPRES) tablet 0.1 mg, 0.1 mg, Oral, Q8H, Olga Lidia Flores MD, 0.1 mg at 05/06/25 0502    dextrose (D50W) (25 g/50 mL) IV injection 25 g, 25 g, Intravenous, Q15 Min PRN, Olga Lidia Flores MD    dextrose (GLUTOSE) oral gel 15 g, 15 g, Oral, Q15 Min PRN, Olga Lidia Flores MD    famotidine (PEPCID) tablet 20 mg, 20 mg, Oral, BID, Olga Lidia Flores MD, 20 mg at 05/06/25 1009    fluticasone (FLONASE) 50 MCG/ACT nasal spray 2 spray, 2 spray, Nasal, Daily, Olga Lidia Flores MD    gabapentin (NEURONTIN) capsule 400 mg, 400 mg, Oral, TID, Olga Lidia Flores MD, 400 mg at 05/06/25 1009    glucagon (GLUCAGEN) injection 1 mg, 1 mg, Intramuscular, Q15 Min PRN, Olga Lidia Flores MD    insulin lispro (HUMALOG/ADMELOG) injection 2-7 Units, 2-7 Units, Subcutaneous, 4x Daily AC & at Bedtime, Olga Lidia Flores MD, 3 Units at 05/05/25 2123    Insert Peripheral IV, , , Once **AND** sodium chloride 0.9 % flush 10 mL, 10 mL, Intravenous, PRN, Olga Lidia Flores MD    tamsulosin (FLOMAX) 24 hr capsule 0.8 mg, 0.8 mg, Oral, Daily, Olga Lidia Flores MD, 0.8 mg at 05/06/25 1009     ASSESSMENT  Acute E. coli emphysematous cystitis  Urinary retention  Bilateral hydronephrosis  Acute kidney injury  Paroxysmal atrial fibrillation  Chronic diastolic congestive heart failure  Aortic stenosis status post aortic valve replacement  Diabetes mellitus  Hypertension  Hyperlipidemia  BPH  Neuropathy  Osteoarthritis with left knee effusion  Chronic kidney disease stage III    PLAN  CPM  Continue Morrell catheter   Discontinue IV fluid  Continue IV antibiotics  Orthopedic surgery consult  Urology and nephrology to follow patient  Adjust home medications  Stress ulcer DVT prophylaxis  Supportive care  PT OT  Discussed with nursing staff and nephrology  Follow closely further recommendation according to hospital course    OLGA LIDIA FLORES MD    Copied text in this note has  been reviewed and is accurate as of 05/06/25

## 2025-05-06 NOTE — PLAN OF CARE
Goal Outcome Evaluation:         A/O, room air, IVF, IV ABX administered, Q2 turn, jessica, will continue to monitor

## 2025-05-06 NOTE — THERAPY EVALUATION
Patient Name: Vj Bright  : 1940    MRN: [id number]                              Today's Date: 2025       Admit Date: 5/3/2025    Visit Dx:     ICD-10-CM ICD-9-CM   1. Emphysematous cystitis  N30.80 595.89   2. Acute urinary retention  R33.8 788.29   3. JUDY (acute kidney injury)  N17.9 584.9     Patient Active Problem List   Diagnosis    S/P AVR (aortic valve replacement)    S/P MVR (mitral valve repair)    Coronary artery disease involving native coronary artery of native heart without angina pectoris    Essential hypertension    Arthritis of left knee    Disorder of aorta    Knee effusion    Knee hemarthrosis, left    Knee pain, left    Paroxysmal atrial fibrillation    ST elevation myocardial infarction (STEMI)    History of ST elevation myocardial infarction (STEMI)    Emphysematous cystitis     Past Medical History:   Diagnosis Date    Acute respiratory failure     Aortic stenosis     BPH (benign prostatic hyperplasia)     CAD (coronary artery disease)     Chest pain     CHF (congestive heart failure)     Diabetes mellitus     Gout     Hyperlipidemia     Hypertension     PAF (paroxysmal atrial fibrillation)     Pulmonary hypertension     STEMI (ST elevation myocardial infarction)      Past Surgical History:   Procedure Laterality Date    AORTIC VALVE REPAIR/REPLACEMENT      CARDIAC SURGERY      CARDIAC SURGERY      CORONARY ANGIOPLASTY WITH STENT PLACEMENT      MITRAL VALVE REPAIR/REPLACEMENT      OTHER SURGICAL HISTORY      AORTIC VALVE REPAIR OF SUBVALVULAR AORTIC STENOSIS    OTHER SURGICAL HISTORY      CARDIAC CATH PROCEDURE OUTCOME: SUCCESSFUL    OTHER SURGICAL HISTORY      VENOUS THROMBECTOMY BY COMBINED LEG INCISION      General Information       Row Name 25 1321          Physical Therapy Time and Intention    Document Type evaluation  -CW     Mode of Treatment physical therapy  -CW       Row Name 25 1321          General Information    Patient Profile Reviewed yes  -CW      Prior Level of Function independent:;transfer;all household mobility;bed mobility  no AD  -CW     Existing Precautions/Restrictions fall;other (see comments)  maintained NWB to L knee due to significant pain and awaiting xray results  -CW     Barriers to Rehab none identified  -CW       Row Name 05/06/25 1321          Living Environment    Current Living Arrangements home  -CW     People in Home alone  -CW       Row Name 05/06/25 1321          Home Main Entrance    Number of Stairs, Main Entrance three  -CW       Row Name 05/06/25 1321          Stairs Within Home, Primary    Number of Stairs, Within Home, Primary twelve  flight to basement  -CW       Row Name 05/06/25 1321          Cognition    Orientation Status (Cognition) oriented x 4  -CW       Row Name 05/06/25 1321          Safety Issues/Impairments Affecting Functional Mobility    Impairments Affecting Function (Mobility) endurance/activity tolerance;pain;range of motion (ROM);strength;balance  -CW               User Key  (r) = Recorded By, (t) = Taken By, (c) = Cosigned By      Initials Name Provider Type    CW Deborah Carballo, PT Physical Therapist                   Mobility       Row Name 05/06/25 1323          Bed Mobility    Bed Mobility supine-sit;sit-supine  -CW     Supine-Sit Andrews (Bed Mobility) 2 person assist;minimum assist (75% patient effort)  -CW     Sit-Supine Andrews (Bed Mobility) 2 person assist;moderate assist (50% patient effort)  -CW     Assistive Device (Bed Mobility) bed rails;head of bed elevated  -CW     Comment, (Bed Mobility) increased time to complete, max A at LLE due to pain  -CW       Row Name 05/06/25 1323          Transfers    Comment, (Transfers) encouraged pt to maintain NWB to LLE for today 2/2 pain  -CW       Row Name 05/06/25 1323          Sit-Stand Transfer    Sit-Stand Andrews (Transfers) moderate assist (50% patient effort);2 person assist;verbal cues;nonverbal cues (demo/gesture)  -CW     Assistive  Device (Sit-Stand Transfers) walker, front-wheeled  -CW     Comment, (Sit-Stand Transfer) able to clear bottom from EOB but did not achieve full upright standing due to L knee pain.  -CW               User Key  (r) = Recorded By, (t) = Taken By, (c) = Cosigned By      Initials Name Provider Type    Deborah Ervin PT Physical Therapist                   Obj/Interventions       Row Name 05/06/25 1324          Range of Motion Comprehensive    Comment, General Range of Motion L knee limited approx 5-60 degrees, RLE WFL  -CW       Row Name 05/06/25 1324          Strength Comprehensive (MMT)    Comment, General Manual Muscle Testing (MMT) Assessment RLE WFL, LLE limited due to pain  -CW       Row Name 05/06/25 1324          Balance    Balance Assessment sitting static balance  -CW     Static Sitting Balance contact guard  -CW     Position, Sitting Balance sitting edge of bed  -CW               User Key  (r) = Recorded By, (t) = Taken By, (c) = Cosigned By      Initials Name Provider Type    Deborah Ervin PT Physical Therapist                   Goals/Plan       Row Name 05/06/25 1514          Bed Mobility Goal 1 (PT)    Activity/Assistive Device (Bed Mobility Goal 1, PT) bed mobility activities, all  -CW     [place] Level/Cues Needed (Bed Mobility Goal 1, PT) modified independence  -CW     Time Frame (Bed Mobility Goal 1, PT) 2 weeks  -CW       Row Name 05/06/25 1516          Transfer Goal 1 (PT)    Activity/Assistive Device (Transfer Goal 1, PT) sit-to-stand/stand-to-sit;bed-to-chair/chair-to-bed  -CW     [place] Level/Cues Needed (Transfer Goal 1, PT) modified independence  -CW     Time Frame (Transfer Goal 1, PT) 2 weeks  -CW       Row Name 05/06/25 1513          Gait Training Goal 1 (PT)    Activity/Assistive Device (Gait Training Goal 1, PT) gait (walking locomotion)  -CW     [place] Level (Gait Training Goal 1, PT) modified independence  -CW     Distance (Gait Training Goal 1, PT) 100   -CW     Time Frame (Gait Training Goal 1, PT) 2 weeks  -CW       Row Name 05/06/25 1514          Therapy Assessment/Plan (PT)    Planned Therapy Interventions (PT) balance training;bed mobility training;gait training;ROM (range of motion);strengthening;postural re-education;transfer training  -CW               User Key  (r) = Recorded By, (t) = Taken By, (c) = Cosigned By      Initials Name Provider Type    CW Deborah Carballo, PT Physical Therapist                   Clinical Impression       Row Name 05/06/25 1320          Pain    Pretreatment Pain Rating 10/10  -CW     Posttreatment Pain Rating 10/10  -CW     Pain Location knee  -CW     Pain Side/Orientation left  -CW     Pain Management Interventions positioning techniques utilized  -CW     Response to Pain Interventions activity participation with increased pain  -CW       Row Name 05/06/25 1326          Plan of Care Review    Plan of Care Reviewed With patient  -CW     Outcome Evaluation Pt is an 85 yo male admitted with emphysematous cystitis and bilateral hydronephrosis, also reports significant L knee pain but denies falls. He lives at home alone, reports independence with all functional mobility without AD use and frequently utilizes the stairs within his home. Today, pt primarily limited by L knee pain. He completed all bed mobility with mod  Ax2, attempted STS with mod A x2. Pt able to clear bottom from EOB but did not achieve full upright standing. He reports significant pain with knee flexion, able to achieve approx 60 degrees flexion upon assessment and is tender to touch on lateral knee. pt may benefit from ongoing PT services. Pt is hopeful for DC home if knee pain resolves. DC recs pending pain management.  -CW       Row Name 05/06/25 2801          Therapy Assessment/Plan (PT)    Rehab Potential (PT) good  -CW     Criteria for Skilled Interventions Met (PT) yes  -CW     Therapy Frequency (PT) 5 times/wk  -CW       Row Name 05/06/25 8022           Vital Signs    O2 Delivery Pre Treatment room air  -CW       Row Name 05/06/25 1325          Positioning and Restraints    Pre-Treatment Position in bed  -CW     Post Treatment Position bed  -CW     In Bed notified nsg;call light within reach;encouraged to call for assist;exit alarm on;fowlers  -               User Key  (r) = Recorded By, (t) = Taken By, (c) = Cosigned By      Initials Name Provider Type    CW Deborah Carballo, PT Physical Therapist                   Outcome Measures       Row Name 05/06/25 1515          How much help from another person do you currently need...    Turning from your back to your side while in flat bed without using bedrails? 2  -CW     Moving from lying on back to sitting on the side of a flat bed without bedrails? 2  -CW     Moving to and from a bed to a chair (including a wheelchair)? 1  -CW     Standing up from a chair using your arms (e.g., wheelchair, bedside chair)? 2  -CW     Climbing 3-5 steps with a railing? 1  -CW     To walk in hospital room? 1  -CW     AM-PAC 6 Clicks Score (PT) 9  -CW     Highest Level of Mobility Goal 3 --> Sit at edge of bed  -CW       Row Name 05/06/25 1515 05/06/25 1226       Functional Assessment    Outcome Measure Options AM-PAC 6 Clicks Basic Mobility (PT)  -CW AM-PAC 6 Clicks Daily Activity (OT)  -CE              User Key  (r) = Recorded By, (t) = Taken By, (c) = Cosigned By      Initials Name Provider Type    Deborah Ervin, GOPI Physical Therapist    Arin Salinas, OT Occupational Therapist                                 Physical Therapy Education       Title: PT OT SLP Therapies (In Progress)       Topic: Physical Therapy (In Progress)       Point: Mobility training (Done)       Learning Progress Summary            Patient Acceptance, E, VU by JOHAN at 5/6/2025 1515                      Point: Home exercise program (Not Started)       Learner Progress:  Not documented in this visit.              Point: Body mechanics (Not Started)        Learner Progress:  Not documented in this visit.              Point: Precautions (Not Started)       Learner Progress:  Not documented in this visit.                              User Key       Initials Effective Dates Name Provider Type Discipline     12/13/22 -  Deborah Carballo, GOPI Physical Therapist PT                  PT Recommendation and Plan  Planned Therapy Interventions (PT): balance training, bed mobility training, gait training, ROM (range of motion), strengthening, postural re-education, transfer training  Outcome Evaluation: Pt is an 83 yo male admitted with emphysematous cystitis and bilateral hydronephrosis, also reports significant L knee pain but denies falls. He lives at home alone, reports independence with all functional mobility without AD use and frequently utilizes the stairs within his home. Today, pt primarily limited by L knee pain. He completed all bed mobility with mod  Ax2, attempted STS with mod A x2. Pt able to clear bottom from EOB but did not achieve full upright standing. He reports significant pain with knee flexion, able to achieve approx 60 degrees flexion upon assessment and is tender to touch on lateral knee. pt may benefit from ongoing PT services. Pt is hopeful for DC home if knee pain resolves. DC recs pending pain management.     Time Calculation:         PT Charges       Row Name 05/06/25 1321             Time Calculation    Start Time 1028  -CW      Stop Time 1051  -CW      Time Calculation (min) 23 min  -CW      PT Received On 05/06/25  -CW      PT - Next Appointment 05/07/25  -CW      PT Goal Re-Cert Due Date 05/20/25  -CW         Time Calculation- PT    Total Timed Code Minutes- PT 9 minute(s)  -CW         Timed Charges    99031 - PT Therapeutic Activity Minutes 9  -CW         Total Minutes    Timed Charges Total Minutes 9  -CW       Total Minutes 9  -CW                User Key  (r) = Recorded By, (t) = Taken By, (c) = Cosigned By      Initials Name Provider  Type    CW Deborah Carballo, PT Physical Therapist                  Therapy Charges for Today       Code Description Service Date Service Provider Modifiers Qty    50744337244 HC PT EVAL MOD COMPLEXITY 3 5/6/2025 Deborah Carballo, PT GP 1    32945626160 HC PT THERAPEUTIC ACT EA 15 MIN 5/6/2025 Deborah Carballo, PT GP 1            PT G-Codes  Outcome Measure Options: AM-PAC 6 Clicks Basic Mobility (PT)  AM-PAC 6 Clicks Score (PT): 9  AM-PAC 6 Clicks Score (OT): 16  PT Discharge Summary  Anticipated Discharge Disposition (PT): inpatient rehabilitation facility    Deborah Carballo PT  5/6/2025

## 2025-05-06 NOTE — PROGRESS NOTES
[facility] Progress Note      Patient Name: Vj Bright  : 1940  MRN: [id number]  Primary Care Physician:  Nany Real MD  Date of admission: 5/3/2025    Subjective     Interval History:   The patient was seen and examined today for follow-up on acute kidney  Has been complaining of right knee pain on minimal exertion and at rest.  Denies any nausea or vomiting.  No fever no chills appetite is good.    Review of Systems:   As noted above    Objective     Vitals:   Temp:  [97.6 °F (36.4 °C)-99.1 °F (37.3 °C)] 98.5 °F (36.9 °C)  Heart Rate:  [64-73] 73  Resp:  [16-18] 18  BP: (114-137)/(52-64) 137/64    Intake/Output Summary (Last 24 hours) at 2025 0718  Last data filed at 2025 0600  Gross per 24 hour   Intake 2722.25 ml   Output 2950 ml   Net -227.75 ml       Physical Exam:    General Appearance: alert, oriented x 3, no acute distress   Skin: warm and dry  HEENT: oral mucosa normal, nonicteric sclera  Neck: supple, no JVD  Lungs: CTA  Heart: RRR, normal S1 and S2  Abdomen: soft, nontender, nondistended  : no palpable bladder  Extremities: no edema, cyanosis or clubbing, right knee painful to touch  Neuro: normal speech and mental status     Scheduled Meds:     ascorbic acid, 500 mg, Oral, Daily  atorvastatin, 40 mg, Oral, Nightly  carvedilol, 6.25 mg, Oral, BID With Meals  cefTRIAXone, 1,000 mg, Intravenous, Q24H  cholecalciferol, , Oral, Daily  cloNIDine, 0.1 mg, Oral, Q8H  famotidine, 20 mg, Oral, BID  fluticasone, 2 spray, Nasal, Daily  gabapentin, 400 mg, Oral, TID  insulin lispro, 2-7 Units, Subcutaneous, 4x Daily AC & at Bedtime  tamsulosin, 0.8 mg, Oral, Daily      IV Meds:   sodium chloride, 75 mL/hr, Last Rate: 75 mL/hr (25 0600)        Results Reviewed:   I have personally reviewed the results from the time of this admission to 2025 07:18 EDT     Results from last 7 days   Lab Units 25  0523 25  0554 25  0531   SODIUM  mmol/L 137 138 137   POTASSIUM mmol/L 4.5 4.4 4.7   CHLORIDE mmol/L 107 107 104   CO2 mmol/L 21.3* 23.3 23.0   BUN mg/dL 22 32* 39*   CREATININE mg/dL 1.20 1.42* 1.64*   CALCIUM mg/dL 8.2* 8.0* 8.2*   BILIRUBIN mg/dL  --  0.3  --    ALK PHOS U/L  --  63  --    ALT (SGPT) U/L  --  18  --    AST (SGOT) U/L  --  13  --    GLUCOSE mg/dL 95 80 60*       Estimated Creatinine Clearance: 51.5 mL/min (by C-G formula based on SCr of 1.2 mg/dL).    Results from last 7 days   Lab Units 05/05/25  0554   MAGNESIUM mg/dL 2.4   PHOSPHORUS mg/dL 3.0       Results from last 7 days   Lab Units 05/05/25  0554   URIC ACID mg/dL 1.5*       Results from last 7 days   Lab Units 05/06/25  0523 05/05/25  0554 05/04/25  0531 05/03/25  1708   WBC 10*3/mm3 7.15 5.93 8.53 9.27   HEMOGLOBIN g/dL 10.2* 10.3* 11.3* 12.6*   PLATELETS 10*3/mm3 165 146 174 160             Assessment / Plan     ASSESSMENT:  Acute kidney injury- Secondary to acute urinary obstruction. CT abd/pelvis without revealed emphysematous cystitis and mild bilateral hydro. Renal function improved with placement of lopez catheter from 2.3 on admission, now down to 1.4 mg/dl today. Electrolytes are acceptable. UA positive for UTI  Chronic kidney disease stage IIIb- Etiology secondary to hypertensive nephrosclerosis, diabetic nephropathy and chronic bladder outlet obstruction from BPH. Followed by Dr. SHUN Garvey with out group as an outpatient. Baseline creatinine fluctuates between 1.4-1.8 mg/dl.   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  Antibiotic by primary and urology  BPH- on BID flomax as an outpatient. Followed by Urology.   Hypertensive chronic kidney disease-blood pressure is acceptable   Type II diabetes mellitus- Per primary team. Patient is on Farxiga as an outpatient. Currently being held due to JUDY. Would recommend continuing to hold until infection is cleared given increased risk for  infections with SGLT-2i  medications.   Anemia in chronic kidney disease- complicated by acute blood loss from hematuria.  Hemoglobin stable  Hyperuricemia on Uloric.  uric acid down to 1.5        PLAN:  Kidney function continues to improve lower than baseline.  Currently complaining of right knee pain.  Doubt this is related to gout given low uric acid  Will check knee x-ray.  Recheck uric acid this morning.  Labs in a.m.      Thank you for involving us in the care of Vj Bright.  Please feel free to call with any questions.    Kunal Rivera MD  05/06/25  07:18 EDT    [facility]  [phone]    Parts of this note may be an electronic transcription/translation of spoken language to printed text using the Dragon dictation system.

## 2025-05-06 NOTE — PLAN OF CARE
Goal Outcome Evaluation:  Plan of Care Reviewed With: patient           Outcome Evaluation: Pt is an 83 yo male admitted with emphysematous cystitis and bilateral hydronephrosis, also reports significant L knee pain but denies falls. He lives at home alone, reports independence with all functional mobility without AD use and frequently utilizes the stairs within his home. Today, pt primarily limited by L knee pain. He completed all bed mobility with mod  Ax2, attempted STS with mod A x2. Pt able to clear bottom from EOB but did not achieve full upright standing. He reports significant pain with knee flexion, able to achieve approx 60 degrees flexion upon assessment and is tender to touch on lateral knee. pt may benefit from ongoing PT services. Pt is hopeful for DC home if knee pain resolves. DC recs pending pain management.    Anticipated Discharge Disposition (PT): inpatient rehabilitation facility

## 2025-05-06 NOTE — CONSULTS
Orthopedic Consult      Patient: Vj Bright    Date of Admission: 5/3/2025  3:29 PM    YOB: 1940    Medical Record Number: [id number]    Consulting Physician: Ochoa Flores MD    Chief Complaints: Left knee pain and swelling    History of Present Illness: 84 y.o. male admitted to [facility] to services of Ochoa Flores MD.  Orthopedics asked to evaluate for left knee pain and swelling.  Patient states he had a urology procedure about a week ago urgings initially fine and then about 4 to 5 days ago he started having some pain particular in the left lower extremity about the knee swelling he has had this occur before but it has been quite bothersome to where he has difficulty putting weight down.  Denies any other injury.    Allergies:   Allergies   Allergen Reactions    Angiotensin Receptor Blockers Angioedema     Lip swelling       Home Medications:    Current Facility-Administered Medications:     ascorbic acid (VITAMIN C) tablet 500 mg, 500 mg, Oral, Daily, Ochoa Flores MD, 500 mg at 05/06/25 1009    atorvastatin (LIPITOR) tablet 40 mg, 40 mg, Oral, Nightly, Ochoa Flores MD, 40 mg at 05/05/25 2025    carvedilol (COREG) tablet 6.25 mg, 6.25 mg, Oral, BID With Meals, Ochoa Flores MD, 6.25 mg at 05/06/25 1732    cefTRIAXone (ROCEPHIN) 1,000 mg in sodium chloride 0.9 % 100 mL MBP, 1,000 mg, Intravenous, Q24H, Ochoa Flores MD, Last Rate: 200 mL/hr at 05/05/25 2118, 1,000 mg at 05/05/25 2118    cholecalciferol (VITAMIN D3) tablet, , Oral, Daily, Ochoa Flores MD, 1,000 Units at 05/06/25 1009    cloNIDine (CATAPRES) tablet 0.1 mg, 0.1 mg, Oral, Q8H, Ochoa Flores MD, 0.1 mg at 05/06/25 1542    dextrose (D50W) (25 g/50 mL) IV injection 25 g, 25 g, Intravenous, Q15 Min PRN, Ochoa Flores MD    dextrose (GLUTOSE) oral gel 15 g, 15 g, Oral, Q15 Min PRN, Ochoa Flores MD    famotidine (PEPCID) tablet 20 mg, 20 mg, Oral, BID, Ochoa Flores MD, 20 mg at 05/06/25 1009    fluticasone (FLONASE) 50  MCG/ACT nasal spray 2 spray, 2 spray, Nasal, Daily, Ochoa Flores MD    gabapentin (NEURONTIN) capsule 400 mg, 400 mg, Oral, TID, Ochoa Flores MD, 400 mg at 05/06/25 1542    glucagon (GLUCAGEN) injection 1 mg, 1 mg, Intramuscular, Q15 Min PRN, Ochoa Flores MD    HYDROcodone-acetaminophen (NORCO) 5-325 MG per tablet 1 tablet, 1 tablet, Oral, Q4H PRN, Ochoa Flores MD, 1 tablet at 05/06/25 1732    insulin lispro (HUMALOG/ADMELOG) injection 2-7 Units, 2-7 Units, Subcutaneous, 4x Daily AC & at Bedtime, Ochoa Flores MD, 2 Units at 05/06/25 1732    Insert Peripheral IV, , , Once **AND** sodium chloride 0.9 % flush 10 mL, 10 mL, Intravenous, PRN, Ochoa Flores MD    tamsulosin (FLOMAX) 24 hr capsule 0.8 mg, 0.8 mg, Oral, Daily, Ochoa Flores MD, 0.8 mg at 05/06/25 1009    Current Medications:  Scheduled Meds:ascorbic acid, 500 mg, Oral, Daily  atorvastatin, 40 mg, Oral, Nightly  carvedilol, 6.25 mg, Oral, BID With Meals  cefTRIAXone, 1,000 mg, Intravenous, Q24H  cholecalciferol, , Oral, Daily  cloNIDine, 0.1 mg, Oral, Q8H  famotidine, 20 mg, Oral, BID  fluticasone, 2 spray, Nasal, Daily  gabapentin, 400 mg, Oral, TID  insulin lispro, 2-7 Units, Subcutaneous, 4x Daily AC & at Bedtime  tamsulosin, 0.8 mg, Oral, Daily      Continuous Infusions:   PRN Meds:.  dextrose    dextrose    glucagon (human recombinant)    HYDROcodone-acetaminophen    Insert Peripheral IV **AND** sodium chloride    Past Medical History:   Diagnosis Date    Acute respiratory failure     Aortic stenosis     BPH (benign prostatic hyperplasia)     CAD (coronary artery disease)     Chest pain     CHF (congestive heart failure)     Diabetes mellitus     Gout     Hyperlipidemia     Hypertension     PAF (paroxysmal atrial fibrillation)     Pulmonary hypertension     STEMI (ST elevation myocardial infarction)        Past Surgical History:   Procedure Laterality Date    AORTIC VALVE REPAIR/REPLACEMENT      CARDIAC SURGERY      CARDIAC SURGERY      CORONARY  ANGIOPLASTY WITH STENT PLACEMENT      MITRAL VALVE REPAIR/REPLACEMENT      OTHER SURGICAL HISTORY      AORTIC VALVE REPAIR OF SUBVALVULAR AORTIC STENOSIS    OTHER SURGICAL HISTORY      CARDIAC CATH PROCEDURE OUTCOME: SUCCESSFUL    OTHER SURGICAL HISTORY      VENOUS THROMBECTOMY BY COMBINED LEG INCISION       Social History     Occupational History    Not on file   Tobacco Use    Smoking status: Never    Smokeless tobacco: Never    Tobacco comments:     CAFFEINE USE: OCC. CHOCOLATE CANDY   Vaping Use    Vaping status: Never Used   Substance and Sexual Activity    Alcohol use: Yes     Alcohol/week: 1.0 standard drink of alcohol     Types: 1 Cans of beer per week     Comment: social    Drug use: No    Sexual activity: Defer      Social History     Social History Narrative    Not on file       Family History   Problem Relation Age of Onset    Hypertension Mother     Heart attack Father        Review of Systems:     Constitutional:  Denies fever, shaking or chills     All other pertinent positives and negatives as noted above in HPI.    Physical Exam: 84 y.o. male    Vitals:    05/06/25 0400 05/06/25 0500 05/06/25 0502 05/06/25 0730   BP:   137/64 138/76   BP Location:    Left arm   Patient Position:    Lying   Pulse: 73 73  73   Resp:    16   Temp:    97.9 °F (36.6 °C)   TempSrc:    Oral   SpO2: 99% 98%  96%   Weight:       Height:         General:  Awake, alert. No acute distress.          Extremities:   Left knee examined skin intact.  Minimal warmth but equal to contralateral side.  No redness.  Positive effusion.  Positive tenderness generalized about the knee.  Does have some pitting edema distally about the lower leg and ankle region.  Has decreased motion left lower extremity particular about the knee due to discomfort.  Compartments are soft and compressible.    All other extremities atraumatic without gross abnormality.     Diagnostic Tests:    Admission on 05/03/2025   Component Date Value Ref Range Status     Color, UA 05/03/2025 Orange (A)  Yellow, Straw Final    Any Substance that causes an abnormal urine color can alter the accuracy of the chemical reactions.    Appearance, UA 05/03/2025 Turbid (A)  Clear Final    pH, UA 05/03/2025 <=5.0  5.0 - 8.0 Final    Specific Gravity, UA 05/03/2025 1.014  1.005 - 1.030 Final    Glucose, UA 05/03/2025 500 mg/dL (2+) (A)  Negative Final    Ketones, UA 05/03/2025 Negative  Negative Final    Bilirubin, UA 05/03/2025 Negative  Negative Final    Blood, UA 05/03/2025 Large (3+) (A)  Negative Final    Protein, UA 05/03/2025 100 mg/dL (2+) (A)  Negative Final    Leuk Esterase, UA 05/03/2025 Large (3+) (A)  Negative Final    Nitrite, UA 05/03/2025 Positive (A)  Negative Final    Urobilinogen, UA 05/03/2025 0.2 E.U./dL  0.2 - 1.0 E.U./dL Final    RBC, UA 05/03/2025 Too Numerous to Count (A)  None Seen, 0-2 /HPF Final    WBC, UA 05/03/2025 Too Numerous to Count (A)  None Seen, 0-2 /HPF Final    Bacteria, UA 05/03/2025 4+ (A)  None Seen /HPF Final    Squamous Epithelial Cells, UA 05/03/2025 0-2  None Seen, 0-2 /HPF Final    Hyaline Casts, UA 05/03/2025 0-2  None Seen /LPF Final    Methodology 05/03/2025 Automated Microscopy   Final    Glucose 05/03/2025 152 (H)  65 - 99 mg/dL Final    BUN 05/03/2025 46 (H)  8 - 23 mg/dL Final    Creatinine 05/03/2025 2.33 (H)  0.76 - 1.27 mg/dL Final    Sodium 05/03/2025 136  136 - 145 mmol/L Final    Potassium 05/03/2025 4.8  3.5 - 5.2 mmol/L Final    Chloride 05/03/2025 98  98 - 107 mmol/L Final    CO2 05/03/2025 24.0  22.0 - 29.0 mmol/L Final    Calcium 05/03/2025 9.2  8.6 - 10.5 mg/dL Final    BUN/Creatinine Ratio 05/03/2025 19.7  7.0 - 25.0 Final    Anion Gap 05/03/2025 14.0  5.0 - 15.0 mmol/L Final    eGFR 05/03/2025 26.7 (L)  >60.0 mL/min/1.73 Final    WBC 05/03/2025 9.27  3.40 - 10.80 10*3/mm3 Final    RBC 05/03/2025 3.94 (L)  4.14 - 5.80 10*6/mm3 Final    Hemoglobin 05/03/2025 12.6 (L)  13.0 - 17.7 g/dL Final    Hematocrit 05/03/2025 38.3  37.5 -  51.0 % Final    MCV 05/03/2025 97.2 (H)  79.0 - 97.0 fL Final    MCH 05/03/2025 32.0  26.6 - 33.0 pg Final    MCHC 05/03/2025 32.9  31.5 - 35.7 g/dL Final    RDW 05/03/2025 12.4  12.3 - 15.4 % Final    RDW-SD 05/03/2025 44.0  37.0 - 54.0 fl Final    MPV 05/03/2025 9.6  6.0 - 12.0 fL Final    Platelets 05/03/2025 160  140 - 450 10*3/mm3 Final    Neutrophil % 05/03/2025 74.5  42.7 - 76.0 % Final    Lymphocyte % 05/03/2025 13.9 (L)  19.6 - 45.3 % Final    Monocyte % 05/03/2025 9.4  5.0 - 12.0 % Final    Eosinophil % 05/03/2025 1.3  0.3 - 6.2 % Final    Basophil % 05/03/2025 0.3  0.0 - 1.5 % Final    Immature Grans % 05/03/2025 0.6 (H)  0.0 - 0.5 % Final    Neutrophils, Absolute 05/03/2025 6.90  1.70 - 7.00 10*3/mm3 Final    Lymphocytes, Absolute 05/03/2025 1.29  0.70 - 3.10 10*3/mm3 Final    Monocytes, Absolute 05/03/2025 0.87  0.10 - 0.90 10*3/mm3 Final    Eosinophils, Absolute 05/03/2025 0.12  0.00 - 0.40 10*3/mm3 Final    Basophils, Absolute 05/03/2025 0.03  0.00 - 0.20 10*3/mm3 Final    Immature Grans, Absolute 05/03/2025 0.06 (H)  0.00 - 0.05 10*3/mm3 Final    nRBC 05/03/2025 0.0  0.0 - 0.2 /100 WBC Final    Urine Culture 05/03/2025 >100,000 CFU/mL Escherichia coli (A)   Final    Glucose 05/04/2025 60 (L)  65 - 99 mg/dL Final    BUN 05/04/2025 39 (H)  8 - 23 mg/dL Final    Creatinine 05/04/2025 1.64 (H)  0.76 - 1.27 mg/dL Final    Sodium 05/04/2025 137  136 - 145 mmol/L Final    Potassium 05/04/2025 4.7  3.5 - 5.2 mmol/L Final    Slight hemolysis detected by analyzer. Result may be falsely elevated.    Chloride 05/04/2025 104  98 - 107 mmol/L Final    CO2 05/04/2025 23.0  22.0 - 29.0 mmol/L Final    Calcium 05/04/2025 8.2 (L)  8.6 - 10.5 mg/dL Final    BUN/Creatinine Ratio 05/04/2025 23.8  7.0 - 25.0 Final    Anion Gap 05/04/2025 10.0  5.0 - 15.0 mmol/L Final    eGFR 05/04/2025 40.7 (L)  >60.0 mL/min/1.73 Final    WBC 05/04/2025 8.53  3.40 - 10.80 10*3/mm3 Final    RBC 05/04/2025 3.56 (L)  4.14 - 5.80 10*6/mm3  Final    Hemoglobin 05/04/2025 11.3 (L)  13.0 - 17.7 g/dL Final    Hematocrit 05/04/2025 33.9 (L)  37.5 - 51.0 % Final    MCV 05/04/2025 95.2  79.0 - 97.0 fL Final    MCH 05/04/2025 31.7  26.6 - 33.0 pg Final    MCHC 05/04/2025 33.3  31.5 - 35.7 g/dL Final    RDW 05/04/2025 12.6  12.3 - 15.4 % Final    RDW-SD 05/04/2025 43.7  37.0 - 54.0 fl Final    MPV 05/04/2025 9.6  6.0 - 12.0 fL Final    Platelets 05/04/2025 174  140 - 450 10*3/mm3 Final    Neutrophil % 05/04/2025 67.1  42.7 - 76.0 % Final    Lymphocyte % 05/04/2025 18.8 (L)  19.6 - 45.3 % Final    Monocyte % 05/04/2025 11.6  5.0 - 12.0 % Final    Eosinophil % 05/04/2025 1.3  0.3 - 6.2 % Final    Basophil % 05/04/2025 0.5  0.0 - 1.5 % Final    Immature Grans % 05/04/2025 0.7 (H)  0.0 - 0.5 % Final    Neutrophils, Absolute 05/04/2025 5.73  1.70 - 7.00 10*3/mm3 Final    Lymphocytes, Absolute 05/04/2025 1.60  0.70 - 3.10 10*3/mm3 Final    Monocytes, Absolute 05/04/2025 0.99 (H)  0.10 - 0.90 10*3/mm3 Final    Eosinophils, Absolute 05/04/2025 0.11  0.00 - 0.40 10*3/mm3 Final    Basophils, Absolute 05/04/2025 0.04  0.00 - 0.20 10*3/mm3 Final    Immature Grans, Absolute 05/04/2025 0.06 (H)  0.00 - 0.05 10*3/mm3 Final    nRBC 05/04/2025 0.0  0.0 - 0.2 /100 WBC Final    Glucose 05/04/2025 212 (H)  70 - 130 mg/dL Final    proBNP 05/05/2025 576.0  0.0 - 1,800.0 pg/mL Final    Glucose 05/04/2025 153 (H)  70 - 130 mg/dL Final    Glucose 05/05/2025 80  65 - 99 mg/dL Final    BUN 05/05/2025 32 (H)  8 - 23 mg/dL Final    Creatinine 05/05/2025 1.42 (H)  0.76 - 1.27 mg/dL Final    Sodium 05/05/2025 138  136 - 145 mmol/L Final    Potassium 05/05/2025 4.4  3.5 - 5.2 mmol/L Final    Chloride 05/05/2025 107  98 - 107 mmol/L Final    CO2 05/05/2025 23.3  22.0 - 29.0 mmol/L Final    Calcium 05/05/2025 8.0 (L)  8.6 - 10.5 mg/dL Final    Total Protein 05/05/2025 5.6 (L)  6.0 - 8.5 g/dL Final    Albumin 05/05/2025 2.6 (L)  3.5 - 5.2 g/dL Final    ALT (SGPT) 05/05/2025 18  1 - 41 U/L Final     AST (SGOT) 05/05/2025 13  1 - 40 U/L Final    Alkaline Phosphatase 05/05/2025 63  39 - 117 U/L Final    Total Bilirubin 05/05/2025 0.3  0.0 - 1.2 mg/dL Final    Globulin 05/05/2025 3.0  gm/dL Final    A/G Ratio 05/05/2025 0.9  g/dL Final    BUN/Creatinine Ratio 05/05/2025 22.5  7.0 - 25.0 Final    Anion Gap 05/05/2025 7.7  5.0 - 15.0 mmol/L Final    eGFR 05/05/2025 48.4 (L)  >60.0 mL/min/1.73 Final    Uric Acid 05/05/2025 1.5 (L)  3.4 - 7.0 mg/dL Final    Magnesium 05/05/2025 2.4  1.6 - 2.4 mg/dL Final    Phosphorus 05/05/2025 3.0  2.5 - 4.5 mg/dL Final    TSH 05/05/2025 1.530  0.270 - 4.200 uIU/mL Final    Total Cholesterol 05/05/2025 139  0 - 200 mg/dL Final    Triglycerides 05/05/2025 117  0 - 150 mg/dL Final    HDL Cholesterol 05/05/2025 29 (L)  40 - 60 mg/dL Final    LDL Cholesterol  05/05/2025 88  0 - 100 mg/dL Final    VLDL Cholesterol 05/05/2025 22  5 - 40 mg/dL Final    LDL/HDL Ratio 05/05/2025 2.99   Final    Hemoglobin A1C 05/05/2025 7.30 (H)  4.80 - 5.60 % Final    WBC 05/05/2025 5.93  3.40 - 10.80 10*3/mm3 Final    RBC 05/05/2025 3.17 (L)  4.14 - 5.80 10*6/mm3 Final    Hemoglobin 05/05/2025 10.3 (L)  13.0 - 17.7 g/dL Final    Hematocrit 05/05/2025 30.7 (L)  37.5 - 51.0 % Final    MCV 05/05/2025 96.8  79.0 - 97.0 fL Final    MCH 05/05/2025 32.5  26.6 - 33.0 pg Final    MCHC 05/05/2025 33.6  31.5 - 35.7 g/dL Final    RDW 05/05/2025 12.7  12.3 - 15.4 % Final    RDW-SD 05/05/2025 44.8  37.0 - 54.0 fl Final    MPV 05/05/2025 9.6  6.0 - 12.0 fL Final    Platelets 05/05/2025 146  140 - 450 10*3/mm3 Final    Neutrophil % 05/05/2025 57.1  42.7 - 76.0 % Final    Lymphocyte % 05/05/2025 25.6  19.6 - 45.3 % Final    Monocyte % 05/05/2025 12.6 (H)  5.0 - 12.0 % Final    Eosinophil % 05/05/2025 2.7  0.3 - 6.2 % Final    Basophil % 05/05/2025 0.8  0.0 - 1.5 % Final    Immature Grans % 05/05/2025 1.2 (H)  0.0 - 0.5 % Final    Neutrophils, Absolute 05/05/2025 3.38  1.70 - 7.00 10*3/mm3 Final    Lymphocytes, Absolute  05/05/2025 1.52  0.70 - 3.10 10*3/mm3 Final    Monocytes, Absolute 05/05/2025 0.75  0.10 - 0.90 10*3/mm3 Final    Eosinophils, Absolute 05/05/2025 0.16  0.00 - 0.40 10*3/mm3 Final    Basophils, Absolute 05/05/2025 0.05  0.00 - 0.20 10*3/mm3 Final    Immature Grans, Absolute 05/05/2025 0.07 (H)  0.00 - 0.05 10*3/mm3 Final    nRBC 05/05/2025 0.0  0.0 - 0.2 /100 WBC Final    Glucose 05/05/2025 82  70 - 130 mg/dL Final    Glucose 05/05/2025 156 (H)  70 - 130 mg/dL Final    Glucose 05/05/2025 152 (H)  70 - 130 mg/dL Final    Glucose 05/05/2025 235 (H)  70 - 130 mg/dL Final    Glucose 05/06/2025 95  65 - 99 mg/dL Final    BUN 05/06/2025 22  8 - 23 mg/dL Final    Creatinine 05/06/2025 1.20  0.76 - 1.27 mg/dL Final    Sodium 05/06/2025 137  136 - 145 mmol/L Final    Potassium 05/06/2025 4.5  3.5 - 5.2 mmol/L Final    Chloride 05/06/2025 107  98 - 107 mmol/L Final    CO2 05/06/2025 21.3 (L)  22.0 - 29.0 mmol/L Final    Calcium 05/06/2025 8.2 (L)  8.6 - 10.5 mg/dL Final    BUN/Creatinine Ratio 05/06/2025 18.3  7.0 - 25.0 Final    Anion Gap 05/06/2025 8.7  5.0 - 15.0 mmol/L Final    eGFR 05/06/2025 59.3 (L)  >60.0 mL/min/1.73 Final    WBC 05/06/2025 7.15  3.40 - 10.80 10*3/mm3 Final    RBC 05/06/2025 3.20 (L)  4.14 - 5.80 10*6/mm3 Final    Hemoglobin 05/06/2025 10.2 (L)  13.0 - 17.7 g/dL Final    Hematocrit 05/06/2025 31.5 (L)  37.5 - 51.0 % Final    MCV 05/06/2025 98.4 (H)  79.0 - 97.0 fL Final    MCH 05/06/2025 31.9  26.6 - 33.0 pg Final    MCHC 05/06/2025 32.4  31.5 - 35.7 g/dL Final    RDW 05/06/2025 12.8  12.3 - 15.4 % Final    RDW-SD 05/06/2025 45.8  37.0 - 54.0 fl Final    MPV 05/06/2025 9.8  6.0 - 12.0 fL Final    Platelets 05/06/2025 165  140 - 450 10*3/mm3 Final    Neutrophil % 05/06/2025 61.2  42.7 - 76.0 % Final    Lymphocyte % 05/06/2025 18.9 (L)  19.6 - 45.3 % Final    Monocyte % 05/06/2025 15.5 (H)  5.0 - 12.0 % Final    Eosinophil % 05/06/2025 1.5  0.3 - 6.2 % Final    Basophil % 05/06/2025 0.4  0.0 - 1.5 %  Final    Immature Grans % 05/06/2025 2.5 (H)  0.0 - 0.5 % Final    Neutrophils, Absolute 05/06/2025 4.37  1.70 - 7.00 10*3/mm3 Final    Lymphocytes, Absolute 05/06/2025 1.35  0.70 - 3.10 10*3/mm3 Final    Monocytes, Absolute 05/06/2025 1.11 (H)  0.10 - 0.90 10*3/mm3 Final    Eosinophils, Absolute 05/06/2025 0.11  0.00 - 0.40 10*3/mm3 Final    Basophils, Absolute 05/06/2025 0.03  0.00 - 0.20 10*3/mm3 Final    Immature Grans, Absolute 05/06/2025 0.18 (H)  0.00 - 0.05 10*3/mm3 Final    nRBC 05/06/2025 0.0  0.0 - 0.2 /100 WBC Final    Glucose 05/06/2025 121  70 - 130 mg/dL Final    Uric Acid 05/06/2025 1.4 (L)  3.4 - 7.0 mg/dL Final    Glucose 05/06/2025 128  70 - 130 mg/dL Final    Glucose 05/06/2025 173 (H)  70 - 130 mg/dL Final     Lab Results (last 24 hours)       Procedure Component Value Units Date/Time    POC Glucose Once [[id number]]  (Abnormal) Collected: 05/06/25 1639    Specimen: Blood Updated: 05/06/25 1641     Glucose 173 mg/dL     POC Glucose Once [[id number]]  (Normal) Collected: 05/06/25 1113    Specimen: Blood Updated: 05/06/25 1115     Glucose 128 mg/dL     Uric Acid [[id number]]  (Abnormal) Collected: 05/06/25 0523    Specimen: Blood Updated: 05/06/25 0740     Uric Acid 1.4 mg/dL     Basic Metabolic Panel [[id number]]  (Abnormal) Collected: 05/06/25 0523    Specimen: Blood Updated: 05/06/25 0708     Glucose 95 mg/dL      BUN 22 mg/dL      Creatinine 1.20 mg/dL      Sodium 137 mmol/L      Potassium 4.5 mmol/L      Chloride 107 mmol/L      CO2 21.3 mmol/L      Calcium 8.2 mg/dL      BUN/Creatinine Ratio 18.3     Anion Gap 8.7 mmol/L      eGFR 59.3 mL/min/1.73     Narrative:      GFR Categories in Chronic Kidney Disease (CKD)              GFR Category          GFR (mL/min/1.73)    Interpretation  G1                    90 or greater        Normal or high (1)  G2                    60-89                Mild decrease (1)  G3a                   45-59                Mild to moderate decrease  G3b                    30-44                Moderate to severe decrease  G4                    15-29                Severe decrease  G5                    14 or less           Kidney failure    (1)In the absence of evidence of kidney disease, neither GFR category G1 or G2 fulfill the criteria for CKD.    eGFR calculation 2021 CKD-EPI creatinine equation, which does not include race as a factor    CBC & Differential [[id number]]  (Abnormal) Collected: 05/06/25 0523    Specimen: Blood Updated: 05/06/25 0656    Narrative:      The following orders were created for panel order CBC & Differential.  Procedure                               Abnormality         Status                     ---------                               -----------         ------                     CBC Auto Differential[[id number]]        Abnormal            Final result                 Please view results for these tests on the individual orders.    CBC Auto Differential [[id number]]  (Abnormal) Collected: 05/06/25 0523    Specimen: Blood Updated: 05/06/25 0656     WBC 7.15 10*3/mm3      RBC 3.20 10*6/mm3      Hemoglobin 10.2 g/dL      Hematocrit 31.5 %      MCV 98.4 fL      MCH 31.9 pg      MCHC 32.4 g/dL      RDW 12.8 %      RDW-SD 45.8 fl      MPV 9.8 fL      Platelets 165 10*3/mm3      Neutrophil % 61.2 %      Lymphocyte % 18.9 %      Monocyte % 15.5 %      Eosinophil % 1.5 %      Basophil % 0.4 %      Immature Grans % 2.5 %      Neutrophils, Absolute 4.37 10*3/mm3      Lymphocytes, Absolute 1.35 10*3/mm3      Monocytes, Absolute 1.11 10*3/mm3      Eosinophils, Absolute 0.11 10*3/mm3      Basophils, Absolute 0.03 10*3/mm3      Immature Grans, Absolute 0.18 10*3/mm3      nRBC 0.0 /100 WBC     POC Glucose Once [[id number]]  (Normal) Collected: 05/06/25 0551    Specimen: Blood Updated: 05/06/25 0553     Glucose 121 mg/dL     POC Glucose Once [[id number]]  (Abnormal) Collected: 05/05/25 2115    Specimen: Blood Updated: 05/05/25 2116     Glucose 235 mg/dL             Imaging:  Left knee imaging personally reviewed individually interpreted do show moderate knee effusion.  Mild to moderate degenerative changes noted throughout the knee.  No acute fractures appreciated.    Assessment: Left knee pain and swelling    Plan: I discussed the findings with the patient.  Given the history and more acute onset I think knee aspiration to rule out any underlying infection is warranted.  This may be simply an arthritic flare as he has had a history of this in the past.  However at this time we will place orders for CT-guided aspiration.   ice to the knee.  N.p.o. at midnight at this time.  CRP and ESR ordered.  Will follow-up with results as if there is Suspicion of infection after aspiration results have been yielded surgical intervention will be warranted.    We will follow labs.    Please call questions or concerns thank you      Date: 5/6/2025    Bassem Paul MD    CC: Ochoa Flores MD

## 2025-05-06 NOTE — CASE MANAGEMENT/SOCIAL WORK
Discharge Planning Assessment  [facility]     Patient Name: Vj Bright  MRN: [id number]  Today's Date: 5/6/2025    Admit Date: 5/3/2025    Plan: home vs SNF; pending progress   Discharge Needs Assessment       Row Name 05/06/25 1356       Living Environment    People in Home alone    Current Living Arrangements home    Potentially Unsafe Housing Conditions none    Primary Care Provided by self    Provides Primary Care For no one    Family Caregiver if Needed child(jose), adult    Quality of Family Relationships helpful;involved    Able to Return to Prior Arrangements yes       Resource/Environmental Concerns    Resource/Environmental Concerns home accessibility    Home Accessibility Concerns stairs to enter home       Transition Planning    Patient/Family Anticipates Transition to inpatient rehabilitation facility    Patient/Family Anticipated Services at Transition none       Discharge Needs Assessment    Readmission Within the Last 30 Days no previous admission in last 30 days    Equipment Currently Used at Home none    Concerns to be Addressed discharge planning    Equipment Needed After Discharge none    Outpatient/Agency/Support Group Needs skilled nursing facility    Discharge Facility/Level of Care Needs nursing facility, skilled                   Discharge Plan       Row Name 05/06/25 4448       Plan    Plan home vs SNF; pending progress    Patient/Family in Agreement with Plan yes    Plan Comments Spoke with pt bedside. Confirmed facesheet correct. Explained role of CCP. Pt reports he lives alone in a house with 3 DORY. He is [employer]s no DME used and still drives. His PCP is Dr. Real and he uses [facility] no issues. He has no HH history and has been to [facility] in past. Discussed difference in IRF vs SNF. Pt reports if rehab needed, he would like referral to [facility]. CCP to follow for needs. PT eval pending.                  Continued Care and Services - Admitted Since 5/3/2025       Destination        Service Provider Request Status Services Address Phone Fax Patient Preferred    [facility] Pending - Request Sent -- [address], [facility] [address] [phone] --                  Expected Discharge Date and Time       Expected Discharge Date Expected Discharge Time    May 7, 2025            Demographic Summary    No documentation.                  Functional Status    No documentation.                  Psychosocial    No documentation.                  Abuse/Neglect    No documentation.                  Legal    No documentation.                  Substance Abuse    No documentation.                  Patient Forms    No documentation.                     [facility]

## 2025-05-07 ENCOUNTER — APPOINTMENT (OUTPATIENT)
Dept: GENERAL RADIOLOGY | Facility: HOSPITAL | Age: 85
DRG: 690 | End: 2025-05-07
Payer: MEDICARE

## 2025-05-07 LAB
ALBUMIN SERPL-MCNC: 2.9 G/DL (ref 3.5–5.2)
ANION GAP SERPL CALCULATED.3IONS-SCNC: 10 MMOL/L (ref 5–15)
APPEARANCE FLD: ABNORMAL
BASOPHILS # BLD AUTO: 0.02 10*3/MM3 (ref 0–0.2)
BASOPHILS NFR BLD AUTO: 0.2 % (ref 0–1.5)
BUN SERPL-MCNC: 22 MG/DL (ref 8–23)
BUN/CREAT SERPL: 16.4 (ref 7–25)
CALCIUM SPEC-SCNC: 8.8 MG/DL (ref 8.6–10.5)
CHLORIDE SERPL-SCNC: 105 MMOL/L (ref 98–107)
CO2 SERPL-SCNC: 20 MMOL/L (ref 22–29)
COLOR FLD: YELLOW
CREAT SERPL-MCNC: 1.34 MG/DL (ref 0.76–1.27)
CRP SERPL-MCNC: 22.05 MG/DL (ref 0–0.5)
CRYSTALS FLD MICRO: NORMAL
DEPRECATED RDW RBC AUTO: 45.3 FL (ref 37–54)
EGFRCR SERPLBLD CKD-EPI 2021: 51.9 ML/MIN/1.73
EOSINOPHIL # BLD AUTO: 0 10*3/MM3 (ref 0–0.4)
EOSINOPHIL NFR BLD AUTO: 0 % (ref 0.3–6.2)
ERYTHROCYTE [DISTWIDTH] IN BLOOD BY AUTOMATED COUNT: 12.3 % (ref 12.3–15.4)
GLUCOSE BLDC GLUCOMTR-MCNC: 110 MG/DL (ref 70–130)
GLUCOSE BLDC GLUCOMTR-MCNC: 126 MG/DL (ref 70–130)
GLUCOSE BLDC GLUCOMTR-MCNC: 177 MG/DL (ref 70–130)
GLUCOSE BLDC GLUCOMTR-MCNC: 262 MG/DL (ref 70–130)
GLUCOSE BLDC GLUCOMTR-MCNC: 307 MG/DL (ref 70–130)
GLUCOSE SERPL-MCNC: 301 MG/DL (ref 65–99)
HCT VFR BLD AUTO: 37.4 % (ref 37.5–51)
HGB BLD-MCNC: 11.9 G/DL (ref 13–17.7)
IMM GRANULOCYTES # BLD AUTO: 0.16 10*3/MM3 (ref 0–0.05)
IMM GRANULOCYTES NFR BLD AUTO: 1.8 % (ref 0–0.5)
LYMPHOCYTES # BLD AUTO: 1.08 10*3/MM3 (ref 0.7–3.1)
LYMPHOCYTES NFR BLD AUTO: 12.2 % (ref 19.6–45.3)
LYMPHOCYTES NFR FLD MANUAL: 5 %
MCH RBC QN AUTO: 31.9 PG (ref 26.6–33)
MCHC RBC AUTO-ENTMCNC: 31.8 G/DL (ref 31.5–35.7)
MCV RBC AUTO: 100.3 FL (ref 79–97)
METHOD: ABNORMAL
MONOCYTES # BLD AUTO: 0.63 10*3/MM3 (ref 0.1–0.9)
MONOCYTES NFR BLD AUTO: 7.1 % (ref 5–12)
MONOCYTES NFR FLD: 7 %
NEUTROPHILS NFR BLD AUTO: 6.99 10*3/MM3 (ref 1.7–7)
NEUTROPHILS NFR BLD AUTO: 78.7 % (ref 42.7–76)
NEUTROPHILS NFR FLD MANUAL: 88 %
NRBC BLD AUTO-RTO: 0 /100 WBC (ref 0–0.2)
NUC CELL # FLD: ABNORMAL /MM3
PHOSPHATE SERPL-MCNC: 3.1 MG/DL (ref 2.5–4.5)
PLATELET # BLD AUTO: 167 10*3/MM3 (ref 140–450)
PMV BLD AUTO: 9.7 FL (ref 6–12)
POTASSIUM SERPL-SCNC: 4.7 MMOL/L (ref 3.5–5.2)
RBC # BLD AUTO: 3.73 10*6/MM3 (ref 4.14–5.8)
RBC # FLD AUTO: 710 /MM3
SODIUM SERPL-SCNC: 135 MMOL/L (ref 136–145)
WBC NRBC COR # BLD AUTO: 8.88 10*3/MM3 (ref 3.4–10.8)

## 2025-05-07 PROCEDURE — 63710000001 INSULIN GLARGINE PER 5 UNITS: Performed by: HOSPITALIST

## 2025-05-07 PROCEDURE — 89060 EXAM SYNOVIAL FLUID CRYSTALS: CPT | Performed by: ORTHOPAEDIC SURGERY

## 2025-05-07 PROCEDURE — 85025 COMPLETE CBC W/AUTO DIFF WBC: CPT | Performed by: HOSPITALIST

## 2025-05-07 PROCEDURE — 25010000002 LIDOCAINE 1 % SOLUTION: Performed by: HOSPITALIST

## 2025-05-07 PROCEDURE — 87205 SMEAR GRAM STAIN: CPT | Performed by: ORTHOPAEDIC SURGERY

## 2025-05-07 PROCEDURE — 63710000001 INSULIN LISPRO (HUMAN) PER 5 UNITS: Performed by: HOSPITALIST

## 2025-05-07 PROCEDURE — 77002 NEEDLE LOCALIZATION BY XRAY: CPT

## 2025-05-07 PROCEDURE — 87070 CULTURE OTHR SPECIMN AEROBIC: CPT | Performed by: ORTHOPAEDIC SURGERY

## 2025-05-07 PROCEDURE — 99233 SBSQ HOSP IP/OBS HIGH 50: CPT | Performed by: ORTHOPAEDIC SURGERY

## 2025-05-07 PROCEDURE — 82948 REAGENT STRIP/BLOOD GLUCOSE: CPT

## 2025-05-07 PROCEDURE — 87075 CULTR BACTERIA EXCEPT BLOOD: CPT | Performed by: ORTHOPAEDIC SURGERY

## 2025-05-07 PROCEDURE — 25810000003 SODIUM CHLORIDE 0.9 % SOLUTION: Performed by: HOSPITALIST

## 2025-05-07 PROCEDURE — 25010000002 CEFTRIAXONE PER 250 MG: Performed by: HOSPITALIST

## 2025-05-07 PROCEDURE — 86140 C-REACTIVE PROTEIN: CPT | Performed by: HOSPITALIST

## 2025-05-07 PROCEDURE — 80069 RENAL FUNCTION PANEL: CPT | Performed by: HOSPITALIST

## 2025-05-07 PROCEDURE — 0S9D3ZZ DRAINAGE OF LEFT KNEE JOINT, PERCUTANEOUS APPROACH: ICD-10-PCS | Performed by: RADIOLOGY

## 2025-05-07 PROCEDURE — 89051 BODY FLUID CELL COUNT: CPT | Performed by: ORTHOPAEDIC SURGERY

## 2025-05-07 RX ORDER — SODIUM CHLORIDE 9 MG/ML
75 INJECTION, SOLUTION INTRAVENOUS CONTINUOUS
Status: ACTIVE | OUTPATIENT
Start: 2025-05-07 | End: 2025-05-07

## 2025-05-07 RX ORDER — LIDOCAINE HYDROCHLORIDE 10 MG/ML
10 INJECTION, SOLUTION INFILTRATION; PERINEURAL ONCE
Status: COMPLETED | OUTPATIENT
Start: 2025-05-07 | End: 2025-05-07

## 2025-05-07 RX ADMIN — INSULIN LISPRO 4 UNITS: 100 INJECTION, SOLUTION INTRAVENOUS; SUBCUTANEOUS at 09:46

## 2025-05-07 RX ADMIN — INSULIN LISPRO 2 UNITS: 100 INJECTION, SOLUTION INTRAVENOUS; SUBCUTANEOUS at 12:40

## 2025-05-07 RX ADMIN — FAMOTIDINE 20 MG: 20 TABLET, FILM COATED ORAL at 21:54

## 2025-05-07 RX ADMIN — SODIUM CHLORIDE 1000 MG: 9 INJECTION, SOLUTION INTRAVENOUS at 21:54

## 2025-05-07 RX ADMIN — OXYCODONE HYDROCHLORIDE AND ACETAMINOPHEN 500 MG: 500 TABLET ORAL at 09:47

## 2025-05-07 RX ADMIN — GABAPENTIN 400 MG: 400 CAPSULE ORAL at 21:54

## 2025-05-07 RX ADMIN — FLUTICASONE PROPIONATE 2 SPRAY: 50 SPRAY, METERED NASAL at 09:47

## 2025-05-07 RX ADMIN — Medication 1000 UNITS: at 09:47

## 2025-05-07 RX ADMIN — CARVEDILOL 6.25 MG: 6.25 TABLET, FILM COATED ORAL at 18:09

## 2025-05-07 RX ADMIN — SODIUM CHLORIDE 75 ML/HR: 9 INJECTION, SOLUTION INTRAVENOUS at 09:52

## 2025-05-07 RX ADMIN — DICLOFENAC SODIUM 2 G: 10 GEL TOPICAL at 21:59

## 2025-05-07 RX ADMIN — HYDROCODONE BITARTRATE AND ACETAMINOPHEN 1 TABLET: 5; 325 TABLET ORAL at 12:44

## 2025-05-07 RX ADMIN — CLONIDINE HYDROCHLORIDE 0.1 MG: 0.1 TABLET ORAL at 21:54

## 2025-05-07 RX ADMIN — DICLOFENAC SODIUM 2 G: 10 GEL TOPICAL at 15:11

## 2025-05-07 RX ADMIN — GABAPENTIN 400 MG: 400 CAPSULE ORAL at 15:11

## 2025-05-07 RX ADMIN — FAMOTIDINE 20 MG: 20 TABLET, FILM COATED ORAL at 09:47

## 2025-05-07 RX ADMIN — GABAPENTIN 400 MG: 400 CAPSULE ORAL at 09:46

## 2025-05-07 RX ADMIN — ATORVASTATIN CALCIUM 40 MG: 20 TABLET, FILM COATED ORAL at 21:54

## 2025-05-07 RX ADMIN — LIDOCAINE HYDROCHLORIDE 5 ML: 10 INJECTION, SOLUTION INFILTRATION; PERINEURAL at 10:53

## 2025-05-07 RX ADMIN — CLONIDINE HYDROCHLORIDE 0.1 MG: 0.1 TABLET ORAL at 15:11

## 2025-05-07 RX ADMIN — INSULIN GLARGINE 10 UNITS: 100 INJECTION, SOLUTION SUBCUTANEOUS at 21:54

## 2025-05-07 RX ADMIN — TAMSULOSIN HYDROCHLORIDE 0.8 MG: 0.4 CAPSULE ORAL at 09:46

## 2025-05-07 RX ADMIN — CARVEDILOL 6.25 MG: 6.25 TABLET, FILM COATED ORAL at 09:46

## 2025-05-07 NOTE — PROGRESS NOTES
[facility] Progress Note      Patient Name: Vj Bright  : 1940  MRN: [id number]  Primary Care Physician:  Nany Real MD  Date of admission: 5/3/2025    Subjective     Interval History:   The patient was seen and examined today for follow-up on acute kidney  Scheduled for left knee aspiration today.  Patient was upset this morning as initial x-ray was ordered for the right knee  No other complaints    Review of Systems:   As noted above    Objective     Vitals:   Temp:  [98 °F (36.7 °C)-98.5 °F (36.9 °C)] 98 °F (36.7 °C)  Heart Rate:  [56-71] 57  Resp:  [16-17] 17  BP: (116-135)/(61-71) 135/61    Intake/Output Summary (Last 24 hours) at 2025 1059  Last data filed at 2025 0219  Gross per 24 hour   Intake --   Output 2750 ml   Net -2750 ml       Physical Exam:    General Appearance: alert, oriented x 3, no acute distress   Skin: warm and dry  HEENT: oral mucosa normal, nonicteric sclera  Neck: supple, no JVD  Lungs: CTA  Heart: RRR, normal S1 and S2  Abdomen: soft, nontender, nondistended  : no palpable bladder  Extremities: no edema, cyanosis or clubbing, right knee painful to touch  Neuro: normal speech and mental status     Scheduled Meds:     ascorbic acid, 500 mg, Oral, Daily  atorvastatin, 40 mg, Oral, Nightly  carvedilol, 6.25 mg, Oral, BID With Meals  cefTRIAXone, 1,000 mg, Intravenous, Q24H  cholecalciferol, , Oral, Daily  cloNIDine, 0.1 mg, Oral, Q8H  famotidine, 20 mg, Oral, BID  fluticasone, 2 spray, Nasal, Daily  gabapentin, 400 mg, Oral, TID  insulin lispro, 2-7 Units, Subcutaneous, 4x Daily AC & at Bedtime  tamsulosin, 0.8 mg, Oral, Daily      IV Meds:   sodium chloride, 75 mL/hr, Last Rate: 75 mL/hr (25 0952)        Results Reviewed:   I have personally reviewed the results from the time of this admission to 2025 10:59 EDT     Results from last 7 days   Lab Units 25  0548 25  0523 25  0554   SODIUM mmol/L 135* 137  138   POTASSIUM mmol/L 4.7 4.5 4.4   CHLORIDE mmol/L 105 107 107   CO2 mmol/L 20.0* 21.3* 23.3   BUN mg/dL 22 22 32*   CREATININE mg/dL 1.34* 1.20 1.42*   CALCIUM mg/dL 8.8 8.2* 8.0*   BILIRUBIN mg/dL  --   --  0.3   ALK PHOS U/L  --   --  63   ALT (SGPT) U/L  --   --  18   AST (SGOT) U/L  --   --  13   GLUCOSE mg/dL 301* 95 80       Estimated Creatinine Clearance: 46.1 mL/min (A) (by C-G formula based on SCr of 1.34 mg/dL (H)).    Results from last 7 days   Lab Units 05/07/25  0548 05/05/25  0554   MAGNESIUM mg/dL  --  2.4   PHOSPHORUS mg/dL 3.1 3.0       Results from last 7 days   Lab Units 05/06/25  0523 05/05/25  0554   URIC ACID mg/dL 1.4* 1.5*       Results from last 7 days   Lab Units 05/07/25  0548 05/06/25  0523 05/05/25  0554 05/04/25  0531 05/03/25  1708   WBC 10*3/mm3 8.88 7.15 5.93 8.53 9.27   HEMOGLOBIN g/dL 11.9* 10.2* 10.3* 11.3* 12.6*   PLATELETS 10*3/mm3 167 165 146 174 160             Assessment / Plan     ASSESSMENT:  Acute kidney injury- Secondary to acute urinary obstruction. CT abd/pelvis without revealed emphysematous cystitis and mild bilateral hydro. Renal function improved with placement of lopez catheter from 2.3 on admission, now down to 1.4 mg/dl today. Electrolytes are acceptable. UA positive for UTI  Chronic kidney disease stage IIIb- Etiology secondary to hypertensive nephrosclerosis, diabetic nephropathy and chronic bladder outlet obstruction from BPH. Followed by Dr. SHUN Garvey with out group as an outpatient. Baseline creatinine fluctuates between 1.4-1.8 mg/dl.   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  Antibiotic by primary and urology  BPH- on BID flomax as an outpatient. Followed by Urology.   Hypertensive chronic kidney disease-blood pressure is acceptable   Type II diabetes mellitus- Per primary team. Patient is on Farxiga as an outpatient. Currently being held due to JUDY. Would recommend continuing to hold until infection is  cleared given increased risk for  infections with SGLT-2i medications.   Anemia in chronic kidney disease- complicated by acute blood loss from hematuria.  Hemoglobin stable  Hyperuricemia on Uloric.  uric acid down to 1.5        PLAN:  Creatinine slightly up today as he is n.p.o. for his procedure  Will start gentle IV hydration  Plan noted for knee aspiration today  Continue to hold Uloric  Labs in a.m.    Thank you for involving us in the care of Vj Bright.  Please feel free to call with any questions.    Kunal Rivera MD  05/07/25  10:59 EDT    [facility]  [phone]    Parts of this note may be an electronic transcription/translation of spoken language to printed text using the Dragon dictation system.

## 2025-05-07 NOTE — PLAN OF CARE
Problem: Adult Inpatient Plan of Care  Goal: Plan of Care Review  Outcome: Progressing  Flowsheets (Taken 5/7/2025 0621)  Outcome Evaluation: VSS. AO x 4. Pt had no c/o of pain throughout the night. Resting comfortably. Morrell in place and draining. NPO for knee asp today.  Goal: Patient-Specific Goal (Individualized)  Outcome: Progressing  Goal: Absence of Hospital-Acquired Illness or Injury  Outcome: Progressing  Intervention: Identify and Manage Fall Risk  Recent Flowsheet Documentation  Taken 5/7/2025 0609 by Sil Lorenz, RN  Safety Promotion/Fall Prevention:   activity supervised   clutter free environment maintained   assistive device/personal items within reach   lighting adjusted   mobility aid in reach   nonskid shoes/slippers when out of bed   safety round/check completed   toileting scheduled  Taken 5/7/2025 0420 by Sil Lorenz, RN  Safety Promotion/Fall Prevention:   activity supervised   assistive device/personal items within reach   clutter free environment maintained   lighting adjusted   mobility aid in reach   nonskid shoes/slippers when out of bed   safety round/check completed   toileting scheduled  Taken 5/7/2025 0219 by Sil Lorenz, RN  Safety Promotion/Fall Prevention:   activity supervised   assistive device/personal items within reach   clutter free environment maintained   lighting adjusted   mobility aid in reach   nonskid shoes/slippers when out of bed   safety round/check completed   toileting scheduled  Taken 5/7/2025 0020 by Sil Lorenz, RN  Safety Promotion/Fall Prevention:   activity supervised   assistive device/personal items within reach   clutter free environment maintained   lighting adjusted   mobility aid in reach   nonskid shoes/slippers when out of bed   safety round/check completed   toileting scheduled  Taken 5/6/2025 2230 by Sil Lorenz, RN  Safety Promotion/Fall Prevention:   activity supervised   assistive device/personal  items within reach   clutter free environment maintained   lighting adjusted   mobility aid in reach   nonskid shoes/slippers when out of bed   safety round/check completed   toileting scheduled  Taken 5/6/2025 2045 by Sil Lorenz, RN  Safety Promotion/Fall Prevention:   activity supervised   assistive device/personal items within reach   clutter free environment maintained   lighting adjusted   mobility aid in reach   nonskid shoes/slippers when out of bed   toileting scheduled   safety round/check completed  Goal: Optimal Comfort and Wellbeing  Outcome: Progressing  Goal: Readiness for Transition of Care  Outcome: Progressing   Goal Outcome Evaluation:              Outcome Evaluation: VSS. AO x 4. Pt had no c/o of pain throughout the night. Resting comfortably. Morrell in place and draining. NPO for knee asp today.

## 2025-05-07 NOTE — PROGRESS NOTES
"Daily progress note    Primary care physician      Subjective  Doing better with no new complaints     History of present illness  84-year-old -American male with history of aortic stenosis coronary artery disease congestive heart failure diabetes hypertension hyperlipidemia atrial fibrillation pulmonary hypertension and chronic kidney disease presented to [facility] emergency room with dysuria for last 1 week which is getting worse.  Patient also have lower abdominal pain with nausea but no vomiting diarrhea.  Patient workup in ER revealed emphysematous cystitis with acute kidney injury and urine retention with bilateral hydronephrosis admitted for management.  Patient has no chest pain shortness of breath palpitation.     REVIEW OF SYSTEMS  Unremarkable except left knee pain     PHYSICAL EXAM  Blood pressure 111/64, pulse 66, temperature 97.7 °F (36.5 °C), temperature source Oral, resp. rate 17, height 182.9 cm (72\"), weight 80.9 kg (178 lb 5.6 oz), SpO2 99%.    GENERAL: Awake and alert no acute distress  HENT: nares patent  EYES: no scleral icterus  CV: regular rhythm, normal rate  RESPIRATORY: normal effort and moving air bilaterally  ABDOMEN: soft, suprapubic tenderness, bowel sounds positive  MUSCULOSKELETAL: no deformity  NEURO: alert, moves all extremities, follows commands  PSYCH:  calm, cooperative  SKIN: warm, dry     LAB RESULTS  Lab Results (last 24 hours)       Procedure Component Value Units Date/Time    Body Fluid Cell Count With Differential - Aspirate, Knee, Left [[id number]]  (Abnormal) Collected: 05/07/25 1135    Specimen: Aspirate from Knee, Left Updated: 05/07/25 1408    Narrative:      The following orders were created for panel order Body Fluid Cell Count With Differential - Aspirate, Knee, Left.  Procedure                               Abnormality         Status                     ---------                               -----------         ------                   "   Body fluid cell count - ...[[id number]]  Abnormal            Final result               Body fluid differential ...[[id number]]                      Final result                 Please view results for these tests on the individual orders.    Body fluid differential - Aspirate, Knee, Left [[id number]] Collected: 05/07/25 1135    Specimen: Aspirate from Knee, Left Updated: 05/07/25 1408     Neutrophils, Fluid % 88 %      Lymphocytes, Fluid % 5 %      Monocytes, Fluid % 7 %     Narrative:      No reference range established. Physician to interpret results with clinical findings.    Crystal Exam, Fluid - Synovial Fluid, [[id number]] Collected: 05/07/25 1135    Specimen: Synovial Fluid Updated: 05/07/25 1407     Crystals, Fluid Intra and Extracellular crystals observed exhibiting polarization characteristics of Calcium Pyrophosphate    Body fluid cell count - Aspirate, Knee, Left [[id number]]  (Abnormal) Collected: 05/07/25 1135    Specimen: Aspirate from Knee, Left Updated: 05/07/25 1308     Color, Fluid Yellow     Appearance, Fluid Cloudy     RBC, Fluid 710 /mm3      Comment: hemacytometer        Nucleated Cells, Fluid 12,910 /mm3      Method: UF 5000 Automated Method    Narrative:      No reference range established. Physician to interpret results with clinical findings.  This test was developed, its performance characteristics determined and judged suitable for clinical purposes by [facility]. It has not been cleared or approved by the FDA. The laboratory is regulated under CLIA as qualified to perform high-complexity testing.    POC Glucose Once [[id number]]  (Abnormal) Collected: 05/07/25 1144    Specimen: Blood Updated: 05/07/25 1150     Glucose 177 mg/dL     Anaerobic Culture - Aspirate, Knee, Left [[id number]] Collected: 05/07/25 1135    Specimen: Aspirate from Knee, Left Updated: 05/07/25 1141    Body Fluid Culture - Aspirate, Synovium [[id number]] Collected: 05/07/25 1135    Specimen:  Aspirate from Synovium Updated: 05/07/25 1141    POC Glucose Once [[id number]]  (Abnormal) Collected: 05/07/25 0819    Specimen: Blood Updated: 05/07/25 0821     Glucose 262 mg/dL     Renal Function Panel [[id number]]  (Abnormal) Collected: 05/07/25 0548    Specimen: Blood Updated: 05/07/25 0735     Glucose 301 mg/dL      BUN 22 mg/dL      Creatinine 1.34 mg/dL      Sodium 135 mmol/L      Potassium 4.7 mmol/L      Chloride 105 mmol/L      CO2 20.0 mmol/L      Calcium 8.8 mg/dL      Albumin 2.9 g/dL      Phosphorus 3.1 mg/dL      Anion Gap 10.0 mmol/L      BUN/Creatinine Ratio 16.4     eGFR 51.9 mL/min/1.73     Narrative:      GFR Categories in Chronic Kidney Disease (CKD)              GFR Category          GFR (mL/min/1.73)    Interpretation  G1                    90 or greater        Normal or high (1)  G2                    60-89                Mild decrease (1)  G3a                   45-59                Mild to moderate decrease  G3b                   30-44                Moderate to severe decrease  G4                    15-29                Severe decrease  G5                    14 or less           Kidney failure    (1)In the absence of evidence of kidney disease, neither GFR category G1 or G2 fulfill the criteria for CKD.    eGFR calculation 2021 CKD-EPI creatinine equation, which does not include race as a factor    C-reactive Protein [[id number]]  (Abnormal) Collected: 05/07/25 0548    Specimen: Blood Updated: 05/07/25 0701     C-Reactive Protein 22.05 mg/dL     CBC & Differential [[id number]]  (Abnormal) Collected: 05/07/25 0548    Specimen: Blood Updated: 05/07/25 0639    Narrative:      The following orders were created for panel order CBC & Differential.  Procedure                               Abnormality         Status                     ---------                               -----------         ------                     CBC Auto Differential[[id number]]        Abnormal            Final result                  Please view results for these tests on the individual orders.    CBC Auto Differential [[id number]]  (Abnormal) Collected: 05/07/25 0548    Specimen: Blood Updated: 05/07/25 0639     WBC 8.88 10*3/mm3      RBC 3.73 10*6/mm3      Hemoglobin 11.9 g/dL      Hematocrit 37.4 %      .3 fL      MCH 31.9 pg      MCHC 31.8 g/dL      RDW 12.3 %      RDW-SD 45.3 fl      MPV 9.7 fL      Platelets 167 10*3/mm3      Neutrophil % 78.7 %      Lymphocyte % 12.2 %      Monocyte % 7.1 %      Eosinophil % 0.0 %      Basophil % 0.2 %      Immature Grans % 1.8 %      Neutrophils, Absolute 6.99 10*3/mm3      Lymphocytes, Absolute 1.08 10*3/mm3      Monocytes, Absolute 0.63 10*3/mm3      Eosinophils, Absolute 0.00 10*3/mm3      Basophils, Absolute 0.02 10*3/mm3      Immature Grans, Absolute 0.16 10*3/mm3      nRBC 0.0 /100 WBC     POC Glucose Once [[id number]]  (Abnormal) Collected: 05/07/25 0535    Specimen: Blood Updated: 05/07/25 0539     Glucose 307 mg/dL     Sedimentation Rate [[id number]]  (Abnormal) Collected: 05/06/25 2038    Specimen: Blood Updated: 05/06/25 2111     Sed Rate 65 mm/hr     POC Glucose Once [[id number]]  (Abnormal) Collected: 05/06/25 2035    Specimen: Blood Updated: 05/06/25 2037     Glucose 291 mg/dL     POC Glucose Once [[id number]]  (Abnormal) Collected: 05/06/25 1639    Specimen: Blood Updated: 05/06/25 1641     Glucose 173 mg/dL           Imaging Results (Last 24 Hours)       Procedure Component Value Units Date/Time    FL Guided Aspiration Joint [[id number]] Collected: 05/07/25 1231     Updated: 05/07/25 1231    Narrative:      LEFT KNEE FLUOROSCOPIC GUIDED JOINT ASPIRATION, 5/7/2025     HISTORY:  84-year-old male with left knee pain and swelling.     CONSENT:  Procedure, risks and alternatives were discussed in detail with the  patient, including the low risk of allergy and infection; patient  questions were answered, and informed consent was obtained. Timeout was  observed in the procedure room for  patient identification and procedure  site verification, and the procedure site was marked by me.     PROCEDURE:  *  Dose area Karma: 10.6 uGym2     Using sterile technique, 1% lidocaine local anesthetic and fluoroscopic  guidance, a 22-gauge spinal needle was placed into the left knee joint  capsule without difficulty. Aspiration of 65 mL of thick yellow viscous  fluid. Specimen was sent to laboratory as requested by the ordering  provider. Patient tolerated procedure well with no sequela.       Impression:      Technically successful left knee fluoroscopically guided joint  aspiration with removal of 65 mL of thick viscous yellow-colored fluid.        Procedure performed by CLAIR Phelps.  By electronically signing this report, I, the supervising radiologist,  attest that I was not present for the procedure(s) but agree with the  final edited report.               Current Facility-Administered Medications:     ascorbic acid (VITAMIN C) tablet 500 mg, 500 mg, Oral, Daily, Ochoa Flores MD, 500 mg at 05/07/25 0947    atorvastatin (LIPITOR) tablet 40 mg, 40 mg, Oral, Nightly, Ochoa Flores MD, 40 mg at 05/06/25 2148    carvedilol (COREG) tablet 6.25 mg, 6.25 mg, Oral, BID With Meals, Ochoa Flores MD, 6.25 mg at 05/07/25 0946    cefTRIAXone (ROCEPHIN) 1,000 mg in sodium chloride 0.9 % 100 mL MBP, 1,000 mg, Intravenous, Q24H, Ochoa Flores MD, Last Rate: 200 mL/hr at 05/06/25 2148, 1,000 mg at 05/06/25 2148    cholecalciferol (VITAMIN D3) tablet, , Oral, Daily, Ochoa Flores MD, 1,000 Units at 05/07/25 0947    cloNIDine (CATAPRES) tablet 0.1 mg, 0.1 mg, Oral, Q8H, Ochoa Flores MD, 0.1 mg at 05/06/25 2148    dextrose (D50W) (25 g/50 mL) IV injection 25 g, 25 g, Intravenous, Q15 Min PRN, Ochoa Flores MD    dextrose (GLUTOSE) oral gel 15 g, 15 g, Oral, Q15 Min PRN, Ochoa Flores MD    Diclofenac Sodium (VOLTAREN) 1 % gel 2 g, 2 g, Topical, TID, Bassem Paul MD    famotidine (PEPCID) tablet 20 mg, 20 mg,  Oral, BID, Olga Lidia Flores MD, 20 mg at 05/07/25 0947    fluticasone (FLONASE) 50 MCG/ACT nasal spray 2 spray, 2 spray, Nasal, Daily, Olga Lidia Flores MD, 2 spray at 05/07/25 0947    gabapentin (NEURONTIN) capsule 400 mg, 400 mg, Oral, TID, Olga Lidia Flores MD, 400 mg at 05/07/25 0946    glucagon (GLUCAGEN) injection 1 mg, 1 mg, Intramuscular, Q15 Min PRN, Olga Lidia Flores MD    HYDROcodone-acetaminophen (NORCO) 5-325 MG per tablet 1 tablet, 1 tablet, Oral, Q4H PRN, Olga Lidia Flores MD, 1 tablet at 05/07/25 1244    insulin lispro (HUMALOG/ADMELOG) injection 2-7 Units, 2-7 Units, Subcutaneous, 4x Daily AC & at Bedtime, Olga Lidia Flores MD, 2 Units at 05/07/25 1240    Insert Peripheral IV, , , Once **AND** sodium chloride 0.9 % flush 10 mL, 10 mL, Intravenous, PRN, Olga Lidia Flores MD    sodium chloride 0.9 % infusion, 75 mL/hr, Intravenous, Continuous, Kunal Rivera MD, Last Rate: 75 mL/hr at 05/07/25 0952, 75 mL/hr at 05/07/25 0952    tamsulosin (FLOMAX) 24 hr capsule 0.8 mg, 0.8 mg, Oral, Daily, Olga Lidia Flores MD, 0.8 mg at 05/07/25 0946     ASSESSMENT  Acute E. coli emphysematous cystitis  Urinary retention  Bilateral hydronephrosis  Acute kidney injury  Moderate left knee effusion status post aspiration  Paroxysmal atrial fibrillation  Chronic diastolic congestive heart failure  Aortic stenosis status post aortic valve replacement  Diabetes mellitus  Hypertension  Hyperlipidemia  BPH  Neuropathy  Osteoarthritis with left knee effusion  Chronic kidney disease stage III    PLAN  CPM  Continue Morrell catheter   Continue IV antibiotics  Orthopedic surgery consult appreciated  Urology and nephrology to follow patient  Adjust home medications  Stress ulcer DVT prophylaxis  Supportive care  PT OT  Discussed with nursing staff and nephrology  Follow closely further recommendation according to hospital course    OLGA LIDIA FLORES MD    Copied text in this note has been reviewed and is accurate as of 05/07/25

## 2025-05-07 NOTE — PLAN OF CARE
Problem: Adult Inpatient Plan of Care  Goal: Plan of Care Review  Outcome: Progressing  Flowsheets (Taken 5/7/2025 1827)  Progress: improving  Outcome Evaluation: VSS, Aox4, pain controlled, Left knee aspriated today in IR, results pnd, lopez in place and draining well, plan to DC to rehab when medically cleared  Plan of Care Reviewed With: patient  Goal: Patient-Specific Goal (Individualized)  Outcome: Progressing  Goal: Absence of Hospital-Acquired Illness or Injury  Outcome: Progressing  Intervention: Identify and Manage Fall Risk  Recent Flowsheet Documentation  Taken 5/7/2025 1811 by Kendra Yost RN  Safety Promotion/Fall Prevention:   safety round/check completed   nonskid shoes/slippers when out of bed   fall prevention program maintained  Taken 5/7/2025 1400 by Kendra Yost RN  Safety Promotion/Fall Prevention: safety round/check completed  Taken 5/7/2025 1244 by Kendra Yost RN  Safety Promotion/Fall Prevention: safety round/check completed  Taken 5/7/2025 0840 by Kendra Yost RN  Safety Promotion/Fall Prevention:   safety round/check completed   nonskid shoes/slippers when out of bed   fall prevention program maintained   clutter free environment maintained  Intervention: Prevent Skin Injury  Recent Flowsheet Documentation  Taken 5/7/2025 1811 by Kendra Yost RN  Body Position:   sitting up in bed   position changed independently  Taken 5/7/2025 1400 by Kendra Yost RN  Body Position:   tilted   right   heels elevated  Taken 5/7/2025 1244 by Kendra Yost RN  Body Position:   position changed independently   sitting up in bed   legs elevated  Taken 5/7/2025 0840 by Kendra Yost RN  Body Position:   position changed independently   sitting up in bed  Skin Protection: incontinence pads utilized  Intervention: Prevent and Manage VTE (Venous Thromboembolism) Risk  Recent Flowsheet Documentation  Taken 5/7/2025 1400 by Kendra Yost RN  VTE Prevention/Management:    bilateral   SCDs (sequential compression devices) off  Taken 5/7/2025 0840 by Kendra Yost RN  VTE Prevention/Management:   bilateral   SCDs (sequential compression devices) on  Intervention: Prevent Infection  Recent Flowsheet Documentation  Taken 5/7/2025 1811 by Kendra Yost RN  Infection Prevention: single patient room provided  Taken 5/7/2025 1400 by Kendra Yost RN  Infection Prevention: single patient room provided  Taken 5/7/2025 0840 by Kendra Yost RN  Infection Prevention: single patient room provided  Goal: Optimal Comfort and Wellbeing  Outcome: Progressing  Intervention: Monitor Pain and Promote Comfort  Recent Flowsheet Documentation  Taken 5/7/2025 1811 by Kendra Yost RN  Pain Management Interventions: pain management plan reviewed with patient/caregiver  Taken 5/7/2025 1400 by Kendra Yost RN  Pain Management Interventions: medication offered but refused  Taken 5/7/2025 1244 by Kendra Yost RN  Pain Management Interventions:   pain management plan reviewed with patient/caregiver   pain medication given  Taken 5/7/2025 0840 by Kendra Yost RN  Pain Management Interventions:   pain management plan reviewed with patient/caregiver   medication offered but refused  Intervention: Provide Person-Centered Care  Recent Flowsheet Documentation  Taken 5/7/2025 1811 by Kendra Yost RN  Trust Relationship/Rapport:   care explained   questions answered  Taken 5/7/2025 1244 by Kendra Yost RN  Trust Relationship/Rapport:   care explained   questions answered  Taken 5/7/2025 0840 by Kendra Yost RN  Trust Relationship/Rapport:   care explained   questions answered  Goal: Readiness for Transition of Care  Outcome: Progressing     Problem: Comorbidity Management  Goal: Blood Glucose Level Within Target Range  Outcome: Progressing  Intervention: Monitor and Manage Glycemia  Recent Flowsheet Documentation  Taken 5/7/2025 1811 by Kendra Yost RN  Medication  Review/Management: medications reviewed  Taken 5/7/2025 0840 by Kendra Yost RN  Medication Review/Management: dosing adjusted  Goal: Maintenance of Heart Failure Symptom Control  Outcome: Progressing  Intervention: Maintain Heart Failure Management  Recent Flowsheet Documentation  Taken 5/7/2025 1811 by Kendra Yost RN  Medication Review/Management: medications reviewed  Taken 5/7/2025 0840 by Kendra Yost RN  Medication Review/Management: dosing adjusted  Goal: Blood Pressure in Desired Range  Outcome: Progressing  Intervention: Maintain Blood Pressure Management  Recent Flowsheet Documentation  Taken 5/7/2025 1811 by Kendra Yost RN  Medication Review/Management: medications reviewed  Taken 5/7/2025 0840 by Kendra Yost RN  Medication Review/Management: dosing adjusted     Problem: Sepsis/Septic Shock  Goal: Optimal Coping  Outcome: Progressing  Goal: Absence of Bleeding  Outcome: Progressing  Goal: Blood Glucose Level Within Target Range  Outcome: Progressing  Goal: Absence of Infection Signs and Symptoms  Outcome: Progressing  Intervention: Initiate Sepsis Management  Recent Flowsheet Documentation  Taken 5/7/2025 1811 by Kendra Yost RN  Infection Prevention: single patient room provided  Taken 5/7/2025 1400 by Kendra Yost RN  Infection Prevention: single patient room provided  Taken 5/7/2025 0840 by Kendra Yost RN  Infection Prevention: single patient room provided  Intervention: Promote Recovery  Recent Flowsheet Documentation  Taken 5/7/2025 1811 by Kendra Yost RN  Activity Management: activity encouraged  Taken 5/7/2025 1400 by Kendra Yost, RN  Activity Management: activity encouraged  Taken 5/7/2025 1244 by Kendra Yost, RN  Activity Management: activity encouraged  Taken 5/7/2025 0840 by Kendra Yost RN  Activity Management: activity encouraged  Goal: Optimal Nutrition Delivery  Outcome: Progressing     Problem: Fall Injury Risk  Goal: Absence  of Fall and Fall-Related Injury  Outcome: Progressing  Intervention: Identify and Manage Contributors  Recent Flowsheet Documentation  Taken 5/7/2025 1811 by Kendra Yost RN  Medication Review/Management: medications reviewed  Taken 5/7/2025 0840 by Kendra Yost RN  Medication Review/Management: dosing adjusted  Intervention: Promote Injury-Free Environment  Recent Flowsheet Documentation  Taken 5/7/2025 1811 by Kendra Yost RN  Safety Promotion/Fall Prevention:   safety round/check completed   nonskid shoes/slippers when out of bed   fall prevention program maintained  Taken 5/7/2025 1400 by Kendra Yost RN  Safety Promotion/Fall Prevention: safety round/check completed  Taken 5/7/2025 1244 by Kendra Yost RN  Safety Promotion/Fall Prevention: safety round/check completed  Taken 5/7/2025 0840 by Kendra Yost RN  Safety Promotion/Fall Prevention:   safety round/check completed   nonskid shoes/slippers when out of bed   fall prevention program maintained   clutter free environment maintained     Problem: Skin Injury Risk Increased  Goal: Skin Health and Integrity  Outcome: Progressing  Intervention: Optimize Skin Protection  Recent Flowsheet Documentation  Taken 5/7/2025 1811 by Kendra Yost RN  Activity Management: activity encouraged  Head of Bed (HOB) Positioning: HOB at 30 degrees  Taken 5/7/2025 1400 by Kendra Yost RN  Activity Management: activity encouraged  Head of Bed (HOB) Positioning: HOB at 20-30 degrees  Taken 5/7/2025 1244 by Kendra Yost RN  Activity Management: activity encouraged  Head of Bed (HOB) Positioning: HOB at 30 degrees  Taken 5/7/2025 0840 by Kendra Yost RN  Activity Management: activity encouraged  Pressure Reduction Techniques:   frequent weight shift encouraged   weight shift assistance provided  Head of Bed (HOB) Positioning: HOB at 30 degrees  Pressure Reduction Devices: alternating pressure pump (MARGRET)  Skin Protection: incontinence  pads utilized   Goal Outcome Evaluation:  Plan of Care Reviewed With: patient        Progress: improving  Outcome Evaluation: VSS, Aox4, pain controlled, Left knee aspriated today in IR, results pnd, lopez in place and draining well, plan to DC to rehab when medically cleared

## 2025-05-07 NOTE — SIGNIFICANT NOTE
05/07/25 1457   OTHER   Discipline physical therapist   Rehab Time/Intention   Session Not Performed other (see comments)  (Patient gone for knee aspiration when checked on this PM. Acute PT will f/u.)   Recommendation   PT - Next Appointment 05/08/25

## 2025-05-07 NOTE — CASE MANAGEMENT/SOCIAL WORK
Continued Stay Note  [facility]     Patient Name: Vj Bright  MRN: [id number]  Today's Date: 5/7/2025    Admit Date: 5/3/2025    Plan: [facility]   Discharge Plan       Row Name 05/07/25 1659       Plan    Plan [facility]    Patient/Family in Agreement with Plan yes    Plan Comments Met with pt in room. He stated that he thought about it and he feels like he will need to go to rehab at discharge and would like to go to [facility]. Called Dina/ [facility] who stated that she should know by 10am tomorrow if she will have a bed for the patient tomorrow afternoon. CCP will follow up then. Pt denied any other needs at this time. Alex RAMEY RN                   Discharge Codes    No documentation.                 Expected Discharge Date and Time       Expected Discharge Date Expected Discharge Time    May 8, 2025               Alex Phelps RN

## 2025-05-07 NOTE — PROGRESS NOTES
ORTHO PROGRESS NOTE      Patient: Vj Bright    Date of Admission: 5/3/2025  3:29 PM    YOB: 1940    Medical Record Number: [id number]    Attending Physician: Ochoa Flores MD          Subjective:    Patient seen and examined this afternoon.  Reports he does continue have some knee pain but does seem better especially after they did the aspiration earlier.      Allergies:   Allergies   Allergen Reactions    Angiotensin Receptor Blockers Angioedema     Lip swelling       Medications:   Current Medications:  Scheduled Meds:ascorbic acid, 500 mg, Oral, Daily  atorvastatin, 40 mg, Oral, Nightly  carvedilol, 6.25 mg, Oral, BID With Meals  cefTRIAXone, 1,000 mg, Intravenous, Q24H  cholecalciferol, , Oral, Daily  cloNIDine, 0.1 mg, Oral, Q8H  Diclofenac Sodium, 2 g, Topical, TID  famotidine, 20 mg, Oral, BID  fluticasone, 2 spray, Nasal, Daily  gabapentin, 400 mg, Oral, TID  insulin glargine, 10 Units, Subcutaneous, Nightly  insulin lispro, 2-7 Units, Subcutaneous, 4x Daily AC & at Bedtime  tamsulosin, 0.8 mg, Oral, Daily      Continuous Infusions:   PRN Meds:.  dextrose    dextrose    glucagon (human recombinant)    HYDROcodone-acetaminophen    Insert Peripheral IV **AND** sodium chloride      Physical Exam: 84 y.o. male      Left knee examined swelling much improved tenderness much improved range of motion improved.  Still has some difficulty with bending the knee but is moving it slightly better as well as the ankle.  Compartment soft and compressible.        Aspiration results are still pending in regards to cultures.  Cell count is not suggestive of infection crystals were positive suggestive of pseudogout.    Assessment:    Emphysematous cystitis     Left knee pseudogout, osteoarthritis    Plan:      I discussed with the patient we would have to wait results I have since relayed those results to his nurse.  Will plan conservative treatment at this time however pending culture results surgical  intervention could be warranted.  Would recommend anti-inflammatory gel medication.  May consider steroid versus anti-inflammatory IV medication for symptom management.  With current infection I would hold on steroid injection to the knee joint.  Steroid injection could be considered outpatient.  PT for mobilization.    We will continue to follow cultures.    Please call with any questions or concerns thank you  Continue pain control measures      Date: 5/7/2025 Time: 15:58 LIYAT    Bassem Paul MD

## 2025-05-08 LAB
ALBUMIN SERPL-MCNC: 2.6 G/DL (ref 3.5–5.2)
ANION GAP SERPL CALCULATED.3IONS-SCNC: 7.6 MMOL/L (ref 5–15)
BASOPHILS # BLD AUTO: 0.05 10*3/MM3 (ref 0–0.2)
BASOPHILS NFR BLD AUTO: 0.4 % (ref 0–1.5)
BUN SERPL-MCNC: 31 MG/DL (ref 8–23)
BUN/CREAT SERPL: 26.1 (ref 7–25)
CALCIUM SPEC-SCNC: 8.3 MG/DL (ref 8.6–10.5)
CHLORIDE SERPL-SCNC: 107 MMOL/L (ref 98–107)
CO2 SERPL-SCNC: 22.4 MMOL/L (ref 22–29)
CREAT SERPL-MCNC: 1.19 MG/DL (ref 0.76–1.27)
DEPRECATED RDW RBC AUTO: 45.1 FL (ref 37–54)
EGFRCR SERPLBLD CKD-EPI 2021: 59.9 ML/MIN/1.73
EOSINOPHIL # BLD AUTO: 0.07 10*3/MM3 (ref 0–0.4)
EOSINOPHIL NFR BLD AUTO: 0.5 % (ref 0.3–6.2)
ERYTHROCYTE [DISTWIDTH] IN BLOOD BY AUTOMATED COUNT: 12.8 % (ref 12.3–15.4)
GLUCOSE BLDC GLUCOMTR-MCNC: 122 MG/DL (ref 70–130)
GLUCOSE BLDC GLUCOMTR-MCNC: 130 MG/DL (ref 70–130)
GLUCOSE BLDC GLUCOMTR-MCNC: 138 MG/DL (ref 70–130)
GLUCOSE BLDC GLUCOMTR-MCNC: 188 MG/DL (ref 70–130)
GLUCOSE SERPL-MCNC: 132 MG/DL (ref 65–99)
HCT VFR BLD AUTO: 31.9 % (ref 37.5–51)
HGB BLD-MCNC: 10.5 G/DL (ref 13–17.7)
IMM GRANULOCYTES # BLD AUTO: 0.13 10*3/MM3 (ref 0–0.05)
IMM GRANULOCYTES NFR BLD AUTO: 1 % (ref 0–0.5)
LYMPHOCYTES # BLD AUTO: 2.08 10*3/MM3 (ref 0.7–3.1)
LYMPHOCYTES NFR BLD AUTO: 16.2 % (ref 19.6–45.3)
MCH RBC QN AUTO: 31.9 PG (ref 26.6–33)
MCHC RBC AUTO-ENTMCNC: 32.9 G/DL (ref 31.5–35.7)
MCV RBC AUTO: 97 FL (ref 79–97)
MONOCYTES # BLD AUTO: 0.92 10*3/MM3 (ref 0.1–0.9)
MONOCYTES NFR BLD AUTO: 7.2 % (ref 5–12)
NEUTROPHILS NFR BLD AUTO: 74.7 % (ref 42.7–76)
NEUTROPHILS NFR BLD AUTO: 9.61 10*3/MM3 (ref 1.7–7)
NRBC BLD AUTO-RTO: 0 /100 WBC (ref 0–0.2)
PHOSPHATE SERPL-MCNC: 3.6 MG/DL (ref 2.5–4.5)
PLATELET # BLD AUTO: 188 10*3/MM3 (ref 140–450)
PMV BLD AUTO: 9.7 FL (ref 6–12)
POTASSIUM SERPL-SCNC: 4.4 MMOL/L (ref 3.5–5.2)
RBC # BLD AUTO: 3.29 10*6/MM3 (ref 4.14–5.8)
SODIUM SERPL-SCNC: 137 MMOL/L (ref 136–145)
WBC NRBC COR # BLD AUTO: 12.86 10*3/MM3 (ref 3.4–10.8)

## 2025-05-08 PROCEDURE — 99233 SBSQ HOSP IP/OBS HIGH 50: CPT | Performed by: ORTHOPAEDIC SURGERY

## 2025-05-08 PROCEDURE — 63710000001 INSULIN LISPRO (HUMAN) PER 5 UNITS: Performed by: HOSPITALIST

## 2025-05-08 PROCEDURE — 82948 REAGENT STRIP/BLOOD GLUCOSE: CPT

## 2025-05-08 PROCEDURE — 80069 RENAL FUNCTION PANEL: CPT | Performed by: HOSPITALIST

## 2025-05-08 PROCEDURE — 97535 SELF CARE MNGMENT TRAINING: CPT

## 2025-05-08 PROCEDURE — 97530 THERAPEUTIC ACTIVITIES: CPT

## 2025-05-08 PROCEDURE — 63710000001 INSULIN GLARGINE PER 5 UNITS: Performed by: HOSPITALIST

## 2025-05-08 PROCEDURE — 85025 COMPLETE CBC W/AUTO DIFF WBC: CPT | Performed by: HOSPITALIST

## 2025-05-08 RX ORDER — ASPIRIN 81 MG/1
81 TABLET, CHEWABLE ORAL DAILY
Status: DISCONTINUED | OUTPATIENT
Start: 2025-05-08 | End: 2025-05-11 | Stop reason: HOSPADM

## 2025-05-08 RX ORDER — ALLOPURINOL 100 MG/1
100 TABLET ORAL DAILY
Status: DISCONTINUED | OUTPATIENT
Start: 2025-05-08 | End: 2025-05-11 | Stop reason: HOSPADM

## 2025-05-08 RX ORDER — BISACODYL 10 MG
10 SUPPOSITORY, RECTAL RECTAL DAILY PRN
Status: DISCONTINUED | OUTPATIENT
Start: 2025-05-08 | End: 2025-05-11

## 2025-05-08 RX ORDER — DOCUSATE SODIUM 100 MG/1
100 CAPSULE, LIQUID FILLED ORAL 2 TIMES DAILY
Status: DISCONTINUED | OUTPATIENT
Start: 2025-05-08 | End: 2025-05-11 | Stop reason: HOSPADM

## 2025-05-08 RX ORDER — POLYETHYLENE GLYCOL 3350 17 G/17G
17 POWDER, FOR SOLUTION ORAL DAILY
Status: DISCONTINUED | OUTPATIENT
Start: 2025-05-08 | End: 2025-05-11 | Stop reason: HOSPADM

## 2025-05-08 RX ADMIN — ALLOPURINOL 100 MG: 100 TABLET ORAL at 15:06

## 2025-05-08 RX ADMIN — CARVEDILOL 6.25 MG: 6.25 TABLET, FILM COATED ORAL at 08:45

## 2025-05-08 RX ADMIN — POLYETHYLENE GLYCOL 3350 17 G: 17 POWDER, FOR SOLUTION ORAL at 08:45

## 2025-05-08 RX ADMIN — CLONIDINE HYDROCHLORIDE 0.1 MG: 0.1 TABLET ORAL at 20:56

## 2025-05-08 RX ADMIN — DOCUSATE SODIUM 100 MG: 100 CAPSULE, LIQUID FILLED ORAL at 20:57

## 2025-05-08 RX ADMIN — OXYCODONE HYDROCHLORIDE AND ACETAMINOPHEN 500 MG: 500 TABLET ORAL at 08:45

## 2025-05-08 RX ADMIN — INSULIN LISPRO 2 UNITS: 100 INJECTION, SOLUTION INTRAVENOUS; SUBCUTANEOUS at 11:47

## 2025-05-08 RX ADMIN — DICLOFENAC SODIUM 2 G: 10 GEL TOPICAL at 20:59

## 2025-05-08 RX ADMIN — INSULIN GLARGINE 10 UNITS: 100 INJECTION, SOLUTION SUBCUTANEOUS at 20:57

## 2025-05-08 RX ADMIN — DICLOFENAC SODIUM 2 G: 10 GEL TOPICAL at 08:46

## 2025-05-08 RX ADMIN — FLUTICASONE PROPIONATE 2 SPRAY: 50 SPRAY, METERED NASAL at 08:45

## 2025-05-08 RX ADMIN — TAMSULOSIN HYDROCHLORIDE 0.8 MG: 0.4 CAPSULE ORAL at 08:45

## 2025-05-08 RX ADMIN — Medication 1000 UNITS: at 08:45

## 2025-05-08 RX ADMIN — CLONIDINE HYDROCHLORIDE 0.1 MG: 0.1 TABLET ORAL at 06:43

## 2025-05-08 RX ADMIN — GABAPENTIN 400 MG: 400 CAPSULE ORAL at 20:56

## 2025-05-08 RX ADMIN — FAMOTIDINE 20 MG: 20 TABLET, FILM COATED ORAL at 20:56

## 2025-05-08 RX ADMIN — DOCUSATE SODIUM 100 MG: 100 CAPSULE, LIQUID FILLED ORAL at 08:45

## 2025-05-08 RX ADMIN — ATORVASTATIN CALCIUM 40 MG: 20 TABLET, FILM COATED ORAL at 20:56

## 2025-05-08 RX ADMIN — DICLOFENAC SODIUM 2 G: 10 GEL TOPICAL at 17:11

## 2025-05-08 RX ADMIN — GABAPENTIN 400 MG: 400 CAPSULE ORAL at 08:45

## 2025-05-08 RX ADMIN — CLONIDINE HYDROCHLORIDE 0.1 MG: 0.1 TABLET ORAL at 15:06

## 2025-05-08 RX ADMIN — FAMOTIDINE 20 MG: 20 TABLET, FILM COATED ORAL at 08:45

## 2025-05-08 RX ADMIN — CARVEDILOL 6.25 MG: 6.25 TABLET, FILM COATED ORAL at 17:10

## 2025-05-08 RX ADMIN — ASPIRIN 81 MG CHEWABLE TABLET 81 MG: 81 TABLET CHEWABLE at 11:47

## 2025-05-08 RX ADMIN — GABAPENTIN 400 MG: 400 CAPSULE ORAL at 17:10

## 2025-05-08 NOTE — THERAPY TREATMENT NOTE
Patient Name: Vj Bright  : 1940    MRN: [id number]                              Today's Date: 2025       Admit Date: 5/3/2025    Visit Dx:     ICD-10-CM ICD-9-CM   1. Emphysematous cystitis  N30.80 595.89   2. Acute urinary retention  R33.8 788.29   3. JUDY (acute kidney injury)  N17.9 584.9     Patient Active Problem List   Diagnosis    S/P AVR (aortic valve replacement)    S/P MVR (mitral valve repair)    Coronary artery disease involving native coronary artery of native heart without angina pectoris    Essential hypertension    Arthritis of left knee    Disorder of aorta    Knee effusion    Knee hemarthrosis, left    Knee pain, left    Paroxysmal atrial fibrillation    ST elevation myocardial infarction (STEMI)    History of ST elevation myocardial infarction (STEMI)    Emphysematous cystitis     Past Medical History:   Diagnosis Date    Acute respiratory failure     Aortic stenosis     BPH (benign prostatic hyperplasia)     CAD (coronary artery disease)     Chest pain     CHF (congestive heart failure)     Diabetes mellitus     Gout     Hyperlipidemia     Hypertension     PAF (paroxysmal atrial fibrillation)     Pulmonary hypertension     STEMI (ST elevation myocardial infarction)      Past Surgical History:   Procedure Laterality Date    AORTIC VALVE REPAIR/REPLACEMENT      CARDIAC SURGERY      CARDIAC SURGERY      CORONARY ANGIOPLASTY WITH STENT PLACEMENT      MITRAL VALVE REPAIR/REPLACEMENT      OTHER SURGICAL HISTORY      AORTIC VALVE REPAIR OF SUBVALVULAR AORTIC STENOSIS    OTHER SURGICAL HISTORY      CARDIAC CATH PROCEDURE OUTCOME: SUCCESSFUL    OTHER SURGICAL HISTORY      VENOUS THROMBECTOMY BY COMBINED LEG INCISION      General Information       Row Name 25 1249          Physical Therapy Time and Intention    Document Type evaluation  -SM     Mode of Treatment physical therapy  -       Row Name 25 1249          General Information    Patient Profile Reviewed yes  -SM      Prior Level of Function independent:  -       Row Name 05/08/25 1249          Cognition    Orientation Status (Cognition) oriented x 4  -       Row Name 05/08/25 1249          Safety Issues/Impairments Affecting Functional Mobility    Impairments Affecting Function (Mobility) endurance/activity tolerance;pain;range of motion (ROM);strength;balance  -               User Key  (r) = Recorded By, (t) = Taken By, (c) = Cosigned By      Initials Name Provider Type     Lissa Lanier PT Physical Therapist                   Mobility       Row Name 05/08/25 1250          Bed Mobility    Bed Mobility supine-sit  -     Supine-Sit [place] (Bed Mobility) contact guard;minimum assist (75% patient effort);verbal cues  -     Assistive Device (Bed Mobility) bed rails;head of bed elevated  -       Row Name 05/08/25 1250          Sit-Stand Transfer    Sit-Stand [place] (Transfers) contact guard  -     Assistive Device (Sit-Stand Transfers) walker, front-wheeled  -       Row Name 05/08/25 1250          Gait/Stairs (Locomotion)    [place] Level (Gait) contact guard  -     Assistive Device (Gait) walker, front-wheeled  -     Distance in Feet (Gait) 25  -SM     Deviations/Abnormal Patterns (Gait) shlomo decreased;antalgic;gait speed decreased  -     Comment, (Gait/Stairs) Gait slow and mildly antalgic with patient using step to gait pattern. No overt LOB noted.  -               User Key  (r) = Recorded By, (t) = Taken By, (c) = Cosigned By      Initials Name Provider Type     Lissa Lanier PT Physical Therapist                   Obj/Interventions       Row Name 05/08/25 1252          Balance    Balance Assessment sitting static balance;sitting dynamic balance;standing static balance;standing dynamic balance  -     Static Sitting Balance standby assist  -     Dynamic Sitting Balance standby assist  -     Position, Sitting Balance sitting edge of bed  -     Static Standing Balance  contact guard  -SM     Dynamic Standing Balance contact guard  -SM     Position/Device Used, Standing Balance supported;walker, front-wheeled  -SM     Balance Interventions sitting;sit to stand;standing;supported;static;dynamic  -SM               User Key  (r) = Recorded By, (t) = Taken By, (c) = Cosigned By      Initials Name Provider Type    Lissa Stewart PT Physical Therapist                   Goals/Plan    No documentation.                  Clinical Impression       Row Name 05/08/25 1302          Pain    Pain Management Interventions exercise or physical activity utilized  -     Response to Pain Interventions activity participation with tolerable pain  -SM     Pre/Posttreatment Pain Comment Patient did not rate  -SM       Row Name 05/08/25 1302          Plan of Care Review    Plan of Care Reviewed With patient  -SM     Outcome Evaluation Patient seen for PT session this AM. Patient supine in bed upon arrival. Patient reports overall improvement in pain. Patient sat up to EOB with Selena. Patient stood from EOB with CGA and ambulated 25ft in room with rwx. Gait slow and mildly antalgic with patient using step to gait pattern. No overt LOB noted. Patient reclined in chair at end of session. Patient would benefit from IPR at d/c. Acute PT will continue to monitor.  -SM       Row Name 05/08/25 1302          Vital Signs    Pre Patient Position Supine  -SM     Intra Patient Position Standing  -SM     Post Patient Position Sitting  -SM       Row Name 05/08/25 1302          Positioning and Restraints    Pre-Treatment Position in bed  -SM     Post Treatment Position chair  -SM     In Chair notified nsg;reclined;call light within reach;encouraged to call for assist;exit alarm on  -SM               User Key  (r) = Recorded By, (t) = Taken By, (c) = Cosigned By      Initials Name Provider Type    Lissa Stewart PT Physical Therapist                   Outcome Measures       Row Name 05/08/25 1304 05/08/25  0845       How much help from another person do you currently need...    Turning from your back to your side while in flat bed without using bedrails? 3  -SM 3  -SW    Moving from lying on back to sitting on the side of a flat bed without bedrails? 3  -SM 2  -SW    Moving to and from a bed to a chair (including a wheelchair)? 3  -SM 2  -SW    Standing up from a chair using your arms (e.g., wheelchair, bedside chair)? 3  -SM 2  -SW    Climbing 3-5 steps with a railing? 2  -SM 1  -SW    To walk in hospital room? 3  -SM 2  -SW    AM-PAC 6 Clicks Score (PT) 17  -SM 12  -SW    Highest Level of Mobility Goal 5 --> Static standing  - 4 --> Transfer to chair/commode  -SW      Row Name 05/08/25 1304 05/08/25 1259       Functional Assessment    Outcome Measure Options AM-PAC 6 Clicks Basic Mobility (PT)  -SM AM-PAC 6 Clicks Daily Activity (OT)  -CE              User Key  (r) = Recorded By, (t) = Taken By, (c) = Cosigned By      Initials Name Provider Type    Kendra Ma, RN Registered Nurse    Lissa Stewart, PT Physical Therapist    Arin Salinas OT Occupational Therapist                                 Physical Therapy Education       Title: PT OT SLP Therapies (Done)       Topic: Physical Therapy (Done)       Point: Mobility training (Done)       Learning Progress Summary            Patient Acceptance, E, VU,NR by  at 5/8/2025 1304    Acceptance, E, VU by  at 5/6/2025 1515                      Point: Home exercise program (Done)       Learning Progress Summary            Patient Acceptance, E, VU,NR by  at 5/8/2025 1304                      Point: Body mechanics (Done)       Learning Progress Summary            Patient Acceptance, E, VU,NR by  at 5/8/2025 1304                      Point: Precautions (Done)       Learning Progress Summary            Patient Acceptance, E, VU,NR by  at 5/8/2025 1304                                      User Key       Initials Effective Dates Name Provider  Type Discipline     12/13/22 -  Deborah Carballo PT Physical Therapist PT     05/02/22 -  Lissa Lanier PT Physical Therapist PT                  PT Recommendation and Plan     Outcome Evaluation: Patient seen for PT session this AM. Patient supine in bed upon arrival. Patient reports overall improvement in pain. Patient sat up to EOB with Selena. Patient stood from EOB with CGA and ambulated 25ft in room with rwx. Gait slow and mildly antalgic with patient using step to gait pattern. No overt LOB noted. Patient reclined in chair at end of session. Patient would benefit from IPR at d/c. Acute PT will continue to monitor.     Time Calculation:         PT Charges       Row Name 05/08/25 1305             Time Calculation    Start Time 0858  -      Stop Time 0923  -      Time Calculation (min) 25 min  -SM      PT Received On 05/08/25  -      PT - Next Appointment 05/09/25  -         Time Calculation- PT    Total Timed Code Minutes- PT 25 minute(s)  -SM         Timed Charges    54147 - PT Therapeutic Activity Minutes 25  -SM         Total Minutes    Timed Charges Total Minutes 25  -SM       Total Minutes 25  -SM                User Key  (r) = Recorded By, (t) = Taken By, (c) = Cosigned By      Initials Name Provider Type     Lissa Lanier PT Physical Therapist                  Therapy Charges for Today       Code Description Service Date Service Provider Modifiers Qty    [id number]  PT THERAPEUTIC ACT EA 15 MIN 5/8/2025 Lissa Lanier PT GP 2            PT G-Codes  Outcome Measure Options: AM-PAC 6 Clicks Basic Mobility (PT)  AM-PAC 6 Clicks Score (PT): 17  AM-PAC 6 Clicks Score (OT): 18  PT Discharge Summary  Anticipated Discharge Disposition (PT): inpatient rehabilitation facility    Lissa Lanier PT  5/8/2025

## 2025-05-08 NOTE — PROGRESS NOTES
[facility] Progress Note      Patient Name: Vj Bright  : 1940  MRN: [id number]  Primary Care Physician:  Nany Real MD  Date of admission: 5/3/2025    Subjective     Interval History:   The patient was seen and examined today for follow-up on acute kidney  Knee aspirated yesterday  Feeling better this morning  Able to walk with physical therapy    Review of Systems:   As noted above    Objective     Vitals:   Temp:  [97.6 °F (36.4 °C)-97.8 °F (36.6 °C)] 97.8 °F (36.6 °C)  Heart Rate:  [58-69] 69  Resp:  [17-18] 18  BP: (111-164)/(64-76) 136/64    Intake/Output Summary (Last 24 hours) at 2025 1002  Last data filed at 2025 0643  Gross per 24 hour   Intake 120 ml   Output 1250 ml   Net -1130 ml       Physical Exam:    General Appearance: alert, oriented x 3, no acute distress   Skin: warm and dry  HEENT: oral mucosa normal, nonicteric sclera  Neck: supple, no JVD  Lungs: CTA  Heart: RRR, normal S1 and S2  Abdomen: soft, nontender, nondistended  : no palpable bladder  Extremities: no edema, cyanosis or clubbing, right knee painful to touch  Neuro: normal speech and mental status     Scheduled Meds:     ascorbic acid, 500 mg, Oral, Daily  atorvastatin, 40 mg, Oral, Nightly  carvedilol, 6.25 mg, Oral, BID With Meals  cefTRIAXone, 1,000 mg, Intravenous, Q24H  cholecalciferol, , Oral, Daily  cloNIDine, 0.1 mg, Oral, Q8H  Diclofenac Sodium, 2 g, Topical, TID  docusate sodium, 100 mg, Oral, BID  famotidine, 20 mg, Oral, BID  fluticasone, 2 spray, Nasal, Daily  gabapentin, 400 mg, Oral, TID  insulin glargine, 10 Units, Subcutaneous, Nightly  insulin lispro, 2-7 Units, Subcutaneous, 4x Daily AC & at Bedtime  polyethylene glycol, 17 g, Oral, Daily  tamsulosin, 0.8 mg, Oral, Daily      IV Meds:          Results Reviewed:   I have personally reviewed the results from the time of this admission to 2025 10:02 EDT     Results from last 7 days   Lab Units  05/08/25 0457 05/07/25  0548 05/06/25  0523 05/05/25  0554   SODIUM mmol/L 137 135* 137 138   POTASSIUM mmol/L 4.4 4.7 4.5 4.4   CHLORIDE mmol/L 107 105 107 107   CO2 mmol/L 22.4 20.0* 21.3* 23.3   BUN mg/dL 31* 22 22 32*   CREATININE mg/dL 1.19 1.34* 1.20 1.42*   CALCIUM mg/dL 8.3* 8.8 8.2* 8.0*   BILIRUBIN mg/dL  --   --   --  0.3   ALK PHOS U/L  --   --   --  63   ALT (SGPT) U/L  --   --   --  18   AST (SGOT) U/L  --   --   --  13   GLUCOSE mg/dL 132* 301* 95 80       Estimated Creatinine Clearance: 51.9 mL/min (by C-G formula based on SCr of 1.19 mg/dL).    Results from last 7 days   Lab Units 05/08/25 0457 05/07/25  0548 05/05/25  0554   MAGNESIUM mg/dL  --   --  2.4   PHOSPHORUS mg/dL 3.6 3.1 3.0       Results from last 7 days   Lab Units 05/06/25  0523 05/05/25  0554   URIC ACID mg/dL 1.4* 1.5*       Results from last 7 days   Lab Units 05/08/25  0457 05/07/25  0548 05/06/25  0523 05/05/25  0554 05/04/25  0531   WBC 10*3/mm3 12.86* 8.88 7.15 5.93 8.53   HEMOGLOBIN g/dL 10.5* 11.9* 10.2* 10.3* 11.3*   PLATELETS 10*3/mm3 188 167 165 146 174             Assessment / Plan     ASSESSMENT:  Acute kidney injury- Secondary to acute urinary obstruction. CT abd/pelvis without revealed emphysematous cystitis and mild bilateral hydro. Renal function improved with placement of lopez catheter.  Electrolytes are acceptable. UA positive for UTI  Chronic kidney disease stage IIIb- Etiology secondary to hypertensive nephrosclerosis, diabetic nephropathy and chronic bladder outlet obstruction from BPH. Followed by Dr. SHUN Garvey with out group as an outpatient. Baseline creatinine fluctuates between 1.4-1.8 mg/dl.   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  Antibiotic by primary and urology  BPH- on BID flomax as an outpatient. Followed by Urology.   Hypertensive chronic kidney disease-blood pressure is acceptable   Type II diabetes mellitus- Per primary team. Patient is on Farxiga  as an outpatient. Currently being held due to JUDY. Would recommend continuing to hold until infection is cleared given increased risk for  infections with SGLT-2i medications.   Anemia in chronic kidney disease- complicated by acute blood loss from hematuria.  Hemoglobin stable  Hyperuricemia on Uloric.  uric acid down to 1.5  Pseudogout        PLAN:  Kidney function continues to improve  Encourage p.o. intake  Avoid NSAIDs if possible given risk of acute kidney injury  If anti-inflammatory drug use to be given would prefer to use steroid  Okay to discharge from nephrology standpoint.  If discharged arrange for follow-up in nephrology clinic 1 to 2 weeks  Labs in a.m.    Thank you for involving us in the care of Vj Bright.  Please feel free to call with any questions.    Kunal Rivera MD  05/08/25  10:02 EDT    [facility]  [phone]    Parts of this note may be an electronic transcription/translation of spoken language to printed text using the Dragon dictation system.

## 2025-05-08 NOTE — PROGRESS NOTES
ORTHO PROGRESS NOTE      Patient: Vj Bright    Date of Admission: 5/3/2025  3:29 PM    YOB: 1940    Medical Record Number: [id number]    Attending Physician: Ochoa Flores MD          Subjective:    Patient seen and examined.  States knee has improved some.  No other complaints      Allergies:   Allergies   Allergen Reactions    Angiotensin Receptor Blockers Angioedema     Lip swelling       Medications:   Current Medications:  Scheduled Meds:ascorbic acid, 500 mg, Oral, Daily  atorvastatin, 40 mg, Oral, Nightly  carvedilol, 6.25 mg, Oral, BID With Meals  cefTRIAXone, 1,000 mg, Intravenous, Q24H  cholecalciferol, , Oral, Daily  cloNIDine, 0.1 mg, Oral, Q8H  Diclofenac Sodium, 2 g, Topical, TID  docusate sodium, 100 mg, Oral, BID  famotidine, 20 mg, Oral, BID  fluticasone, 2 spray, Nasal, Daily  gabapentin, 400 mg, Oral, TID  insulin glargine, 10 Units, Subcutaneous, Nightly  insulin lispro, 2-7 Units, Subcutaneous, 4x Daily AC & at Bedtime  polyethylene glycol, 17 g, Oral, Daily  tamsulosin, 0.8 mg, Oral, Daily      Continuous Infusions:   PRN Meds:.  bisacodyl    dextrose    dextrose    glucagon (human recombinant)    HYDROcodone-acetaminophen    Insert Peripheral IV **AND** sodium chloride      Physical Exam: 84 y.o. male    Left knee examined swelling minimal.  No tenderness palpation.  Motion improved.      Weight Bearing: Weightbearing as tolerated PT for mobilization    Diagnostic Tests:   Aspiration labs  Reviewed cultures still negative to date we will continue to monitor.    Findings positive for pseudogout/calcium pyrophosphate      Assessment:    Left knee pseudogout, arthritis      Emphysematous cystitis       Plan:      I discussed findings with the patient.  Will plan conservative treatment but continue to monitor labs if any positive cultures result surgical intervention could be warranted.  At this time recommend topical anti-inflammatory medication.  Possible IV  anti-inflammatories or oral if can take and tolerate ice to left lower extremity PT for mobilization.  I would hold on any steroid injection at this time.  Patient to follow-up with his orthopedic provider upon discharge.  Please call questions or concerns thank you      Date: 5/8/2025Time: 08:57 LIYAT    Bassem Paul MD

## 2025-05-08 NOTE — PLAN OF CARE
Problem: Adult Inpatient Plan of Care  Goal: Plan of Care Review  Outcome: Progressing   Goal Outcome Evaluation:      VSS, NSR-TRACY. RA. HuaO x4. Ax1-2 up. Porsche patent. ACHS. IV rocephin antibx. L Knee edema +2, tenderness, no prn pain med per pt. Plans to go to [facility], waiting for last culture to come back on L knee aspiration.

## 2025-05-08 NOTE — THERAPY TREATMENT NOTE
Patient Name: Vj Bright  : 1940    MRN: [id number]                              Today's Date: 2025       Admit Date: 5/3/2025    Visit Dx:     ICD-10-CM ICD-9-CM   1. Emphysematous cystitis  N30.80 595.89   2. Acute urinary retention  R33.8 788.29   3. JUDY (acute kidney injury)  N17.9 584.9     Patient Active Problem List   Diagnosis    S/P AVR (aortic valve replacement)    S/P MVR (mitral valve repair)    Coronary artery disease involving native coronary artery of native heart without angina pectoris    Essential hypertension    Arthritis of left knee    Disorder of aorta    Knee effusion    Knee hemarthrosis, left    Knee pain, left    Paroxysmal atrial fibrillation    ST elevation myocardial infarction (STEMI)    History of ST elevation myocardial infarction (STEMI)    Emphysematous cystitis     Past Medical History:   Diagnosis Date    Acute respiratory failure     Aortic stenosis     BPH (benign prostatic hyperplasia)     CAD (coronary artery disease)     Chest pain     CHF (congestive heart failure)     Diabetes mellitus     Gout     Hyperlipidemia     Hypertension     PAF (paroxysmal atrial fibrillation)     Pulmonary hypertension     STEMI (ST elevation myocardial infarction)      Past Surgical History:   Procedure Laterality Date    AORTIC VALVE REPAIR/REPLACEMENT      CARDIAC SURGERY      CARDIAC SURGERY      CORONARY ANGIOPLASTY WITH STENT PLACEMENT      MITRAL VALVE REPAIR/REPLACEMENT      OTHER SURGICAL HISTORY      AORTIC VALVE REPAIR OF SUBVALVULAR AORTIC STENOSIS    OTHER SURGICAL HISTORY      CARDIAC CATH PROCEDURE OUTCOME: SUCCESSFUL    OTHER SURGICAL HISTORY      VENOUS THROMBECTOMY BY COMBINED LEG INCISION      General Information       Row Name 25 1254          OT Time and Intention    Subjective Information no complaints  -CE     Document Type therapy note (daily note)  -CE     Mode of Treatment co-treatment;physical therapy;occupational therapy  pt seen for co-tx 2/2  decreased activity tolerance and pain with mobility last session requiring assist x 2 to safely attempt STS  -CE     Patient Effort good  -CE     Symptoms Noted During/After Treatment none  -CE       Row Name 05/08/25 1254          General Information    Patient Profile Reviewed yes  -CE     Existing Precautions/Restrictions fall  WBAT per ortho  -CE     Barriers to Rehab none identified  -CE       Row Name 05/08/25 1254          Safety Issues/Impairments Affecting Functional Mobility    Impairments Affecting Function (Mobility) endurance/activity tolerance;range of motion (ROM);strength  -CE               User Key  (r) = Recorded By, (t) = Taken By, (c) = Cosigned By      Initials Name Provider Type    CE Arin Ruiz OT Occupational Therapist                     Mobility/ADL's       Row Name 05/08/25 1255          Bed Mobility    Bed Mobility supine-sit  -CE     Supine-Sit [place] (Bed Mobility) contact guard;minimum assist (75% patient effort);verbal cues  -CE     Assistive Device (Bed Mobility) bed rails;head of bed elevated  -CE     Comment, (Bed Mobility) increased time  -CE       Row Name 05/08/25 1255          Transfers    Transfers sit-stand transfer;stand-sit transfer  -CE       Row Name 05/08/25 1255          Sit-Stand Transfer    Sit-Stand [place] (Transfers) contact guard  -CE     Assistive Device (Sit-Stand Transfers) walker, front-wheeled  -CE       Row Name 05/08/25 1255          Stand-Sit Transfer    Stand-Sit [place] (Transfers) 1 person assist;contact guard  -CE     Assistive Device (Stand-Sit Transfers) walker, front-wheeled  -CE       Row Name 05/08/25 1255          Functional Mobility    Functional Mobility- Comment Pt able to ambulate in room and completed ADLs standing level at sink with SBA-CGA x 1 using FWW.  -CE       Row Name 05/08/25 1255          Activities of Daily Living    BADL Assessment/Intervention grooming  -CE       Row Name 05/08/25 1255          Grooming  Assessment/Training    [place] Level (Grooming) oral care regimen;wash face, hands;contact guard assist;set up  -CE     Position (Grooming) supported standing;sink side  -CE               User Key  (r) = Recorded By, (t) = Taken By, (c) = Cosigned By      Initials Name Provider Type    CE Arin Ruiz OT Occupational Therapist                   Obj/Interventions    No documentation.                  Goals/Plan    No documentation.                  Clinical Impression       Row Name 05/08/25 1257          Pain Assessment    Pre/Posttreatment Pain Comment pt reporting mild pain with L knee flexion but did not formally rate  -CE       Row Name 05/08/25 1257          Plan of Care Review    Plan of Care Reviewed With patient  -CE     Progress improving  -CE     Outcome Evaluation Pt seen for OT and PT co-tx focusing on functional transfers and mobility as well as ADLs standing level. Pt moving much better this date and with less pain. Able to ambulate in room with CGA x 1 using FWW and complete grooming standing at sink. Pt con't to rely heavily on use of FWW which he does not own/use at baseline. OT will con't to follow for stated goals and recommend d/c to IPR vs HWA.  -CE       Row Name 05/08/25 1257          Therapy Assessment/Plan (OT)    Rehab Potential (OT) good  -CE     Therapy Frequency (OT) 5 times/wk  -CE       Row Name 05/08/25 1257          Vital Signs    O2 Delivery Pre Treatment room air  -CE     O2 Delivery Intra Treatment room air  -CE     O2 Delivery Post Treatment room air  -CE     Pre Patient Position Supine  -CE     Intra Patient Position Standing  -CE     Post Patient Position Sitting  -CE       Row Name 05/08/25 1257          Positioning and Restraints    Pre-Treatment Position in bed  -CE     Post Treatment Position chair  -CE     In Chair notified nsg;reclined;call light within reach;encouraged to call for assist  -CE               User Key  (r) = Recorded By, (t) = Taken By, (c) =  Cosigned By      Initials Name Provider Type    Arin Salinas OT Occupational Therapist                   Outcome Measures       Row Name 05/08/25 1259          How much help from another is currently needed...    Putting on and taking off regular lower body clothing? 2  -CE     Bathing (including washing, rinsing, and drying) 2  -CE     Toileting (which includes using toilet bed pan or urinal) 3  -CE     Putting on and taking off regular upper body clothing 3  -CE     Taking care of personal grooming (such as brushing teeth) 4  -CE     Eating meals 4  -CE     AM-PAC 6 Clicks Score (OT) 18  -CE       Row Name 05/08/25 0845          How much help from another person do you currently need...    Turning from your back to your side while in flat bed without using bedrails? 3  -SW     Moving from lying on back to sitting on the side of a flat bed without bedrails? 2  -SW     Moving to and from a bed to a chair (including a wheelchair)? 2  -SW     Standing up from a chair using your arms (e.g., wheelchair, bedside chair)? 2  -SW     Climbing 3-5 steps with a railing? 1  -SW     To walk in hospital room? 2  -SW     AM-PAC 6 Clicks Score (PT) 12  -SW     Highest Level of Mobility Goal 4 --> Transfer to chair/commode  -       Row Name 05/08/25 1259          Functional Assessment    Outcome Measure Options AM-PAC 6 Clicks Daily Activity (OT)  -CE               User Key  (r) = Recorded By, (t) = Taken By, (c) = Cosigned By      Initials Name Provider Type    Kendra Ma RN Registered Nurse    Arin Salinas OT Occupational Therapist                    Occupational Therapy Education       Title: PT OT SLP Therapies (In Progress)       Topic: Occupational Therapy (Done)       Point: ADL training (Done)       Learning Progress Summary            Patient Acceptance, E, VU by CE at 5/6/2025 1227                      Point: Precautions (Done)       Learning Progress Summary            Patient Acceptance, E,  VU by CE at 5/6/2025 1227                                      User Key       Initials Effective Dates Name Provider Type Discipline    CE 10/17/22 -  Arin Ruiz OT Occupational Therapist OT                  OT Recommendation and Plan  Planned Therapy Interventions (OT): activity tolerance training, adaptive equipment training, BADL retraining, functional balance retraining, patient/caregiver education/training, strengthening exercise, transfer/mobility retraining  Therapy Frequency (OT): 5 times/wk  Plan of Care Review  Plan of Care Reviewed With: patient  Progress: improving  Outcome Evaluation: Pt seen for OT and PT co-tx focusing on functional transfers and mobility as well as ADLs standing level. Pt moving much better this date and with less pain. Able to ambulate in room with CGA x 1 using FWW and complete grooming standing at sink. Pt con't to rely heavily on use of FWW which he does not own/use at baseline. OT will con't to follow for stated goals and recommend d/c to IPR vs HWA.     Time Calculation:   Evaluation Complexity (OT)  Review Occupational Profile/Medical/Therapy History Complexity: expanded/moderate complexity  Assessment, Occupational Performance/Identification of Deficit Complexity: 3-5 performance deficits  Clinical Decision Making Complexity (OT): detailed assessment/moderate complexity  Overall Complexity of Evaluation (OT): moderate complexity     Time Calculation- OT       Row Name 05/08/25 1300             Time Calculation- OT    OT Start Time 0900  -CE      OT Stop Time 0923  -CE      OT Time Calculation (min) 23 min  -CE      Total Timed Code Minutes- OT 23 minute(s)  -CE      OT Received On 05/08/25  -CE      OT - Next Appointment 05/09/25  -CE         Timed Charges    98622 - OT Self Care/Mgmt Minutes 23  -CE         Total Minutes    Timed Charges Total Minutes 23  -CE       Total Minutes 23  -CE                User Key  (r) = Recorded By, (t) = Taken By, (c) = Cosigned By       Initials Name Provider Type    CE Arin Ruiz OT Occupational Therapist                  Therapy Charges for Today       Code Description Service Date Service Provider Modifiers Qty    [id number] HC OT SELF CARE/MGMT/TRAIN EA 15 MIN 5/8/2025 Arin Ruiz OT GO 2                 Arin Ruiz OT  5/8/2025

## 2025-05-08 NOTE — PLAN OF CARE
Goal Outcome Evaluation:  Plan of Care Reviewed With: patient           Outcome Evaluation: Patient seen for PT session this AM. Patient supine in bed upon arrival. Patient reports overall improvement in pain. Patient sat up to EOB with Selena. Patient stood from EOB with CGA and ambulated 25ft in room with rwx. Gait slow and mildly antalgic with patient using step to gait pattern. No overt LOB noted. Patient reclined in chair at end of session. Patient would benefit from IPR at d/c. Acute PT will continue to monitor.    Anticipated Discharge Disposition (PT): inpatient rehabilitation facility

## 2025-05-08 NOTE — PLAN OF CARE
Goal Outcome Evaluation:  Plan of Care Reviewed With: patient        Progress: improving  Outcome Evaluation: Pt seen for OT and PT co-tx focusing on functional transfers and mobility as well as ADLs standing level. Pt moving much better this date and with less pain. Able to ambulate in room with CGA x 1 using FWW and complete grooming standing at sink. Pt con't to rely heavily on use of FWW which he does not own/use at baseline. OT will con't to follow for stated goals and recommend d/c to IPR vs HWA.    Anticipated Discharge Disposition (OT): home with assist, inpatient rehabilitation facility

## 2025-05-08 NOTE — CASE MANAGEMENT/SOCIAL WORK
Continued Stay Note  [facility]     Patient Name: Vj Bright  MRN: [id number]  Today's Date: 5/8/2025    Admit Date: 5/3/2025    Plan: [facility] acute rehab   Discharge Plan       Row Name 05/08/25 1638       Plan    Plan [facility] acute rehab    Patient/Family in Agreement with Plan yes    Plan Comments Met with pt in room. [facility] stated that they do not have a bed and do not anticipate one until next week. Discussed options with the patient and he stated that he wants to go to [facility]. Referral entered. He denied any other needs at this time. CCP following. Alex RAMEY RN                   Discharge Codes    No documentation.                 Expected Discharge Date and Time       Expected Discharge Date Expected Discharge Time    May 9, 2025               Alex Phelps RN

## 2025-05-08 NOTE — PROGRESS NOTES
"Daily progress note    Primary care physician      Subjective  Doing better with no new complaints     History of present illness  84-year-old -American male with history of aortic stenosis coronary artery disease congestive heart failure diabetes hypertension hyperlipidemia atrial fibrillation pulmonary hypertension and chronic kidney disease presented to [facility] emergency room with dysuria for last 1 week which is getting worse.  Patient also have lower abdominal pain with nausea but no vomiting diarrhea.  Patient workup in ER revealed emphysematous cystitis with acute kidney injury and urine retention with bilateral hydronephrosis admitted for management.  Patient has no chest pain shortness of breath palpitation.     REVIEW OF SYSTEMS  Unremarkable except left knee pain     PHYSICAL EXAM  Blood pressure 130/59, pulse 64, temperature 97.7 °F (36.5 °C), resp. rate 16, height 182.9 cm (72\"), weight 80.9 kg (178 lb 5.6 oz), SpO2 98%.    GENERAL: Awake and alert no acute distress  HENT: nares patent  EYES: no scleral icterus  CV: regular rhythm, normal rate  RESPIRATORY: normal effort and moving air bilaterally  ABDOMEN: soft, suprapubic tenderness, bowel sounds positive  MUSCULOSKELETAL: no deformity  NEURO: alert, moves all extremities, follows commands  PSYCH:  calm, cooperative  SKIN: warm, dry     LAB RESULTS  Lab Results (last 24 hours)       Procedure Component Value Units Date/Time    POC Glucose Once [[id number]]  (Abnormal) Collected: 05/08/25 1139    Specimen: Blood Updated: 05/08/25 1143     Glucose 188 mg/dL     Body Fluid Culture - Aspirate, Synovium [[id number]] Collected: 05/07/25 1135    Specimen: Aspirate from Synovium Updated: 05/08/25 0810     Body Fluid Culture No growth     Gram Stain Moderate (3+) WBCs seen      No organisms seen    POC Glucose Once [[id number]]  (Abnormal) Collected: 05/08/25 0602    Specimen: Blood Updated: 05/08/25 0619     Glucose 138 mg/dL     " Renal Function Panel [[id number]]  (Abnormal) Collected: 05/08/25 0457    Specimen: Blood Updated: 05/08/25 0601     Glucose 132 mg/dL      BUN 31 mg/dL      Creatinine 1.19 mg/dL      Sodium 137 mmol/L      Potassium 4.4 mmol/L      Chloride 107 mmol/L      CO2 22.4 mmol/L      Calcium 8.3 mg/dL      Albumin 2.6 g/dL      Phosphorus 3.6 mg/dL      Anion Gap 7.6 mmol/L      BUN/Creatinine Ratio 26.1     eGFR 59.9 mL/min/1.73     Narrative:      GFR Categories in Chronic Kidney Disease (CKD)              GFR Category          GFR (mL/min/1.73)    Interpretation  G1                    90 or greater        Normal or high (1)  G2                    60-89                Mild decrease (1)  G3a                   45-59                Mild to moderate decrease  G3b                   30-44                Moderate to severe decrease  G4                    15-29                Severe decrease  G5                    14 or less           Kidney failure    (1)In the absence of evidence of kidney disease, neither GFR category G1 or G2 fulfill the criteria for CKD.    eGFR calculation 2021 CKD-EPI creatinine equation, which does not include race as a factor    CBC & Differential [[id number]]  (Abnormal) Collected: 05/08/25 0457    Specimen: Blood Updated: 05/08/25 0548    Narrative:      The following orders were created for panel order CBC & Differential.  Procedure                               Abnormality         Status                     ---------                               -----------         ------                     CBC Auto Differential[[id number]]        Abnormal            Final result                 Please view results for these tests on the individual orders.    CBC Auto Differential [[id number]]  (Abnormal) Collected: 05/08/25 0457    Specimen: Blood Updated: 05/08/25 0548     WBC 12.86 10*3/mm3      RBC 3.29 10*6/mm3      Hemoglobin 10.5 g/dL      Hematocrit 31.9 %      MCV 97.0 fL      MCH 31.9 pg      MCHC 32.9  g/dL      RDW 12.8 %      RDW-SD 45.1 fl      MPV 9.7 fL      Platelets 188 10*3/mm3      Neutrophil % 74.7 %      Lymphocyte % 16.2 %      Monocyte % 7.2 %      Eosinophil % 0.5 %      Basophil % 0.4 %      Immature Grans % 1.0 %      Neutrophils, Absolute 9.61 10*3/mm3      Lymphocytes, Absolute 2.08 10*3/mm3      Monocytes, Absolute 0.92 10*3/mm3      Eosinophils, Absolute 0.07 10*3/mm3      Basophils, Absolute 0.05 10*3/mm3      Immature Grans, Absolute 0.13 10*3/mm3      nRBC 0.0 /100 WBC     POC Glucose Once [[id number]]  (Normal) Collected: 05/07/25 2031    Specimen: Blood Updated: 05/07/25 2033     Glucose 126 mg/dL     POC Glucose Once [[id number]]  (Normal) Collected: 05/07/25 1630    Specimen: Blood Updated: 05/07/25 1633     Glucose 110 mg/dL     Body Fluid Cell Count With Differential - Aspirate, Knee, Left [[id number]]  (Abnormal) Collected: 05/07/25 1135    Specimen: Aspirate from Knee, Left Updated: 05/07/25 1408    Narrative:      The following orders were created for panel order Body Fluid Cell Count With Differential - Aspirate, Knee, Left.  Procedure                               Abnormality         Status                     ---------                               -----------         ------                     Body fluid cell count - ...[[id number]]  Abnormal            Final result               Body fluid differential ...[[id number]]                      Final result                 Please view results for these tests on the individual orders.    Body fluid differential - Aspirate, Knee, Left [[id number]] Collected: 05/07/25 1135    Specimen: Aspirate from Knee, Left Updated: 05/07/25 1408     Neutrophils, Fluid % 88 %      Lymphocytes, Fluid % 5 %      Monocytes, Fluid % 7 %     Narrative:      No reference range established. Physician to interpret results with clinical findings.    Crystal Exam, Fluid - Synovial Fluid, [[id number]] Collected: 05/07/25 1135    Specimen: Synovial Fluid Updated:  05/07/25 1407     Crystals, Fluid Intra and Extracellular crystals observed exhibiting polarization characteristics of Calcium Pyrophosphate          Imaging Results (Last 24 Hours)       Procedure Component Value Units Date/Time    FL Guided Aspiration Joint [[id number]] Collected: 05/07/25 1231     Updated: 05/08/25 0713    Narrative:      LEFT KNEE FLUOROSCOPIC GUIDED JOINT ASPIRATION, 5/7/2025     HISTORY:  84-year-old male with left knee pain and swelling.     CONSENT:  Procedure, risks and alternatives were discussed in detail with the  patient, including the low risk of allergy and infection; patient  questions were answered, and informed consent was obtained. Timeout was  observed in the procedure room for patient identification and procedure  site verification, and the procedure site was marked by me.     PROCEDURE:  *  Dose area Karma: 10.6 uGym2     Using sterile technique, 1% lidocaine local anesthetic and fluoroscopic  guidance, a 22-gauge spinal needle was placed into the left knee joint  capsule without difficulty. Aspiration of 65 mL of thick yellow viscous  fluid. Specimen was sent to laboratory as requested by the ordering  provider. Patient tolerated procedure well with no sequela.       Impression:      Technically successful left knee fluoroscopically guided joint  aspiration with removal of 65 mL of thick viscous yellow-colored fluid.        Procedure performed by CLAIR Phelps.  By electronically signing this report, I, the supervising radiologist,  attest that I was not present for the procedure(s) but agree with the  final edited report.     This report was finalized on 5/8/2025 7:10 AM by Dr. Shiraz Vela M.D on Workstation: [id number]               Current Facility-Administered Medications:     ascorbic acid (VITAMIN C) tablet 500 mg, 500 mg, Oral, Daily, Ochoa Flores MD, 500 mg at 05/08/25 0845    aspirin chewable tablet 81 mg, 81 mg, Oral, Daily, Ochoa Flores MD, 81 mg at  05/08/25 1147    atorvastatin (LIPITOR) tablet 40 mg, 40 mg, Oral, Nightly, Ochoa Flores MD, 40 mg at 05/07/25 2154    bisacodyl (DULCOLAX) suppository 10 mg, 10 mg, Rectal, Daily PRN, Ochoa Flores MD    carvedilol (COREG) tablet 6.25 mg, 6.25 mg, Oral, BID With Meals, Ochoa Flores MD, 6.25 mg at 05/08/25 0845    cefTRIAXone (ROCEPHIN) 1,000 mg in sodium chloride 0.9 % 100 mL MBP, 1,000 mg, Intravenous, Q24H, Ochoa Flores MD, Last Rate: 200 mL/hr at 05/07/25 2154, 1,000 mg at 05/07/25 2154    cholecalciferol (VITAMIN D3) tablet, , Oral, Daily, Ochoa Flores MD, 1,000 Units at 05/08/25 0845    cloNIDine (CATAPRES) tablet 0.1 mg, 0.1 mg, Oral, Q8H, Ochoa Flores MD, 0.1 mg at 05/08/25 0643    dextrose (D50W) (25 g/50 mL) IV injection 25 g, 25 g, Intravenous, Q15 Min PRN, Ochoa Flores MD    dextrose (GLUTOSE) oral gel 15 g, 15 g, Oral, Q15 Min PRN, Ochoa Flores MD    Diclofenac Sodium (VOLTAREN) 1 % gel 2 g, 2 g, Topical, TID, Bassem Paul MD, 2 g at 05/08/25 0846    docusate sodium (COLACE) capsule 100 mg, 100 mg, Oral, BID, Ochoa Flores MD, 100 mg at 05/08/25 0845    famotidine (PEPCID) tablet 20 mg, 20 mg, Oral, BID, Ochoa Flores MD, 20 mg at 05/08/25 0845    fluticasone (FLONASE) 50 MCG/ACT nasal spray 2 spray, 2 spray, Nasal, Daily, Ochoa Flores MD, 2 spray at 05/08/25 0845    gabapentin (NEURONTIN) capsule 400 mg, 400 mg, Oral, TID, Ochoa Flores MD, 400 mg at 05/08/25 0845    glucagon (GLUCAGEN) injection 1 mg, 1 mg, Intramuscular, Q15 Min PRN, Ochoa Flores MD    HYDROcodone-acetaminophen (NORCO) 5-325 MG per tablet 1 tablet, 1 tablet, Oral, Q4H PRN, Ochoa Flores MD, 1 tablet at 05/07/25 1244    insulin glargine (LANTUS, SEMGLEE) injection 10 Units, 10 Units, Subcutaneous, Nightly, Ochoa Flores MD, 10 Units at 05/07/25 2154    insulin lispro (HUMALOG/ADMELOG) injection 2-7 Units, 2-7 Units, Subcutaneous, 4x Daily AC & at Bedtime, Ochoa Flores MD, 2 Units at 05/08/25 1147    polyethylene glycol  (MIRALAX) packet 17 g, 17 g, Oral, Daily, Olga Lidia Flores MD, 17 g at 05/08/25 0845    Insert Peripheral IV, , , Once **AND** sodium chloride 0.9 % flush 10 mL, 10 mL, Intravenous, PRN, Olga Lidia Flores MD    tamsulosin (FLOMAX) 24 hr capsule 0.8 mg, 0.8 mg, Oral, Daily, Olga Lidia Flores MD, 0.8 mg at 05/08/25 0845     ASSESSMENT  Acute E. coli emphysematous cystitis  Urinary retention  Bilateral hydronephrosis  Acute kidney injury  Left knee pseudogout with effusion status post aspiration  Paroxysmal atrial fibrillation  Chronic diastolic congestive heart failure  Aortic stenosis status post aortic valve replacement  Diabetes mellitus  Hypertension  Hyperlipidemia  BPH  Neuropathy  Osteoarthritis with left knee effusion  Chronic kidney disease stage III    PLAN  CPM  Continue Morrell catheter   Continue IV antibiotics for cystitis  Orthopedic surgery consult appreciated  Urology and nephrology to follow patient  Adjust home medications  Stress ulcer DVT prophylaxis  Supportive care  PT OT  Discussed with nursing staff and nephrology  Discharge planning    OLGA LIDIA FLORES MD    Copied text in this note has been reviewed and is accurate as of 05/08/25

## 2025-05-09 LAB
ALBUMIN SERPL-MCNC: 2.9 G/DL (ref 3.5–5.2)
ANION GAP SERPL CALCULATED.3IONS-SCNC: 9 MMOL/L (ref 5–15)
ANION GAP SERPL CALCULATED.3IONS-SCNC: 9.4 MMOL/L (ref 5–15)
BASOPHILS # BLD AUTO: 0.04 10*3/MM3 (ref 0–0.2)
BASOPHILS NFR BLD AUTO: 0.5 % (ref 0–1.5)
BUN SERPL-MCNC: 30 MG/DL (ref 8–23)
BUN SERPL-MCNC: 31 MG/DL (ref 8–23)
BUN/CREAT SERPL: 20.8 (ref 7–25)
BUN/CREAT SERPL: 20.9 (ref 7–25)
CALCIUM SPEC-SCNC: 8.3 MG/DL (ref 8.6–10.5)
CALCIUM SPEC-SCNC: 8.7 MG/DL (ref 8.6–10.5)
CHLORIDE SERPL-SCNC: 104 MMOL/L (ref 98–107)
CHLORIDE SERPL-SCNC: 107 MMOL/L (ref 98–107)
CO2 SERPL-SCNC: 22.6 MMOL/L (ref 22–29)
CO2 SERPL-SCNC: 23 MMOL/L (ref 22–29)
CREAT SERPL-MCNC: 1.44 MG/DL (ref 0.76–1.27)
CREAT SERPL-MCNC: 1.48 MG/DL (ref 0.76–1.27)
DEPRECATED RDW RBC AUTO: 46.1 FL (ref 37–54)
EGFRCR SERPLBLD CKD-EPI 2021: 46.1 ML/MIN/1.73
EGFRCR SERPLBLD CKD-EPI 2021: 47.6 ML/MIN/1.73
EOSINOPHIL # BLD AUTO: 0.14 10*3/MM3 (ref 0–0.4)
EOSINOPHIL NFR BLD AUTO: 1.8 % (ref 0.3–6.2)
ERYTHROCYTE [DISTWIDTH] IN BLOOD BY AUTOMATED COUNT: 12.6 % (ref 12.3–15.4)
GLUCOSE BLDC GLUCOMTR-MCNC: 122 MG/DL (ref 70–130)
GLUCOSE BLDC GLUCOMTR-MCNC: 130 MG/DL (ref 70–130)
GLUCOSE BLDC GLUCOMTR-MCNC: 145 MG/DL (ref 70–130)
GLUCOSE BLDC GLUCOMTR-MCNC: 157 MG/DL (ref 70–130)
GLUCOSE SERPL-MCNC: 135 MG/DL (ref 65–99)
GLUCOSE SERPL-MCNC: 175 MG/DL (ref 65–99)
HCT VFR BLD AUTO: 34.3 % (ref 37.5–51)
HGB BLD-MCNC: 11 G/DL (ref 13–17.7)
IMM GRANULOCYTES # BLD AUTO: 0.16 10*3/MM3 (ref 0–0.05)
IMM GRANULOCYTES NFR BLD AUTO: 2 % (ref 0–0.5)
LYMPHOCYTES # BLD AUTO: 1.53 10*3/MM3 (ref 0.7–3.1)
LYMPHOCYTES NFR BLD AUTO: 19.5 % (ref 19.6–45.3)
MCH RBC QN AUTO: 32.1 PG (ref 26.6–33)
MCHC RBC AUTO-ENTMCNC: 32.1 G/DL (ref 31.5–35.7)
MCV RBC AUTO: 100 FL (ref 79–97)
MONOCYTES # BLD AUTO: 0.63 10*3/MM3 (ref 0.1–0.9)
MONOCYTES NFR BLD AUTO: 8 % (ref 5–12)
NEUTROPHILS NFR BLD AUTO: 5.36 10*3/MM3 (ref 1.7–7)
NEUTROPHILS NFR BLD AUTO: 68.2 % (ref 42.7–76)
NRBC BLD AUTO-RTO: 0 /100 WBC (ref 0–0.2)
PHOSPHATE SERPL-MCNC: 3.7 MG/DL (ref 2.5–4.5)
PLATELET # BLD AUTO: 190 10*3/MM3 (ref 140–450)
PMV BLD AUTO: 9.4 FL (ref 6–12)
POTASSIUM SERPL-SCNC: 4.4 MMOL/L (ref 3.5–5.2)
POTASSIUM SERPL-SCNC: 5.1 MMOL/L (ref 3.5–5.2)
RBC # BLD AUTO: 3.43 10*6/MM3 (ref 4.14–5.8)
SODIUM SERPL-SCNC: 136 MMOL/L (ref 136–145)
SODIUM SERPL-SCNC: 139 MMOL/L (ref 136–145)
WBC NRBC COR # BLD AUTO: 7.86 10*3/MM3 (ref 3.4–10.8)

## 2025-05-09 PROCEDURE — 80048 BASIC METABOLIC PNL TOTAL CA: CPT | Performed by: HOSPITALIST

## 2025-05-09 PROCEDURE — 99232 SBSQ HOSP IP/OBS MODERATE 35: CPT | Performed by: ORTHOPAEDIC SURGERY

## 2025-05-09 PROCEDURE — 85025 COMPLETE CBC W/AUTO DIFF WBC: CPT | Performed by: HOSPITALIST

## 2025-05-09 PROCEDURE — 63710000001 INSULIN GLARGINE PER 5 UNITS: Performed by: HOSPITALIST

## 2025-05-09 PROCEDURE — 25810000003 SODIUM CHLORIDE 0.9 % SOLUTION: Performed by: HOSPITALIST

## 2025-05-09 PROCEDURE — 82948 REAGENT STRIP/BLOOD GLUCOSE: CPT

## 2025-05-09 PROCEDURE — 80069 RENAL FUNCTION PANEL: CPT | Performed by: HOSPITALIST

## 2025-05-09 RX ORDER — SODIUM CHLORIDE 9 MG/ML
75 INJECTION, SOLUTION INTRAVENOUS CONTINUOUS
Status: ACTIVE | OUTPATIENT
Start: 2025-05-09 | End: 2025-05-09

## 2025-05-09 RX ADMIN — GABAPENTIN 400 MG: 400 CAPSULE ORAL at 16:07

## 2025-05-09 RX ADMIN — GABAPENTIN 400 MG: 400 CAPSULE ORAL at 09:18

## 2025-05-09 RX ADMIN — ALLOPURINOL 100 MG: 100 TABLET ORAL at 09:17

## 2025-05-09 RX ADMIN — TAMSULOSIN HYDROCHLORIDE 0.8 MG: 0.4 CAPSULE ORAL at 09:18

## 2025-05-09 RX ADMIN — FLUTICASONE PROPIONATE 2 SPRAY: 50 SPRAY, METERED NASAL at 09:19

## 2025-05-09 RX ADMIN — ATORVASTATIN CALCIUM 40 MG: 20 TABLET, FILM COATED ORAL at 20:00

## 2025-05-09 RX ADMIN — DICLOFENAC SODIUM 2 G: 10 GEL TOPICAL at 16:07

## 2025-05-09 RX ADMIN — CLONIDINE HYDROCHLORIDE 0.1 MG: 0.1 TABLET ORAL at 06:31

## 2025-05-09 RX ADMIN — CLONIDINE HYDROCHLORIDE 0.1 MG: 0.1 TABLET ORAL at 21:51

## 2025-05-09 RX ADMIN — CARVEDILOL 6.25 MG: 6.25 TABLET, FILM COATED ORAL at 09:18

## 2025-05-09 RX ADMIN — DICLOFENAC SODIUM 2 G: 10 GEL TOPICAL at 20:01

## 2025-05-09 RX ADMIN — ASPIRIN 81 MG CHEWABLE TABLET 81 MG: 81 TABLET CHEWABLE at 09:18

## 2025-05-09 RX ADMIN — SODIUM CHLORIDE 75 ML/HR: 9 INJECTION, SOLUTION INTRAVENOUS at 11:04

## 2025-05-09 RX ADMIN — Medication 1000 UNITS: at 09:18

## 2025-05-09 RX ADMIN — CARVEDILOL 6.25 MG: 6.25 TABLET, FILM COATED ORAL at 17:24

## 2025-05-09 RX ADMIN — OXYCODONE HYDROCHLORIDE AND ACETAMINOPHEN 500 MG: 500 TABLET ORAL at 09:17

## 2025-05-09 RX ADMIN — CLONIDINE HYDROCHLORIDE 0.1 MG: 0.1 TABLET ORAL at 16:07

## 2025-05-09 RX ADMIN — GABAPENTIN 400 MG: 400 CAPSULE ORAL at 20:00

## 2025-05-09 RX ADMIN — INSULIN GLARGINE 10 UNITS: 100 INJECTION, SOLUTION SUBCUTANEOUS at 21:51

## 2025-05-09 RX ADMIN — DICLOFENAC SODIUM 2 G: 10 GEL TOPICAL at 09:19

## 2025-05-09 RX ADMIN — FAMOTIDINE 20 MG: 20 TABLET, FILM COATED ORAL at 09:17

## 2025-05-09 RX ADMIN — FAMOTIDINE 20 MG: 20 TABLET, FILM COATED ORAL at 20:00

## 2025-05-09 NOTE — PROGRESS NOTES
"Daily progress note    Primary care physician      Subjective  Doing better with no new complaints     History of present illness  84-year-old -American male with history of aortic stenosis coronary artery disease congestive heart failure diabetes hypertension hyperlipidemia atrial fibrillation pulmonary hypertension and chronic kidney disease presented to [facility] emergency room with dysuria for last 1 week which is getting worse.  Patient also have lower abdominal pain with nausea but no vomiting diarrhea.  Patient workup in ER revealed emphysematous cystitis with acute kidney injury and urine retention with bilateral hydronephrosis admitted for management.  Patient has no chest pain shortness of breath palpitation.     REVIEW OF SYSTEMS  Unremarkable except weakness     PHYSICAL EXAM  Blood pressure 157/75, pulse 65, temperature 98.4 °F (36.9 °C), temperature source Oral, resp. rate 16, height 182.9 cm (72\"), weight 80.9 kg (178 lb 5.6 oz), SpO2 97%.    GENERAL: Awake and alert no acute distress  HENT: nares patent  EYES: no scleral icterus  CV: regular rhythm, normal rate  RESPIRATORY: normal effort and moving air bilaterally  ABDOMEN: soft, suprapubic tenderness, bowel sounds positive  MUSCULOSKELETAL: no deformity  NEURO: alert, moves all extremities, follows commands  PSYCH:  calm, cooperative  SKIN: warm, dry     LAB RESULTS  Lab Results (last 24 hours)       Procedure Component Value Units Date/Time    POC Glucose Once [[id number]]  (Normal) Collected: 05/09/25 1138    Specimen: Blood Updated: 05/09/25 1140     Glucose 130 mg/dL     Basic Metabolic Panel [[id number]]  (Abnormal) Collected: 05/09/25 0625    Specimen: Blood from Arm, Left Updated: 05/09/25 0732     Glucose 135 mg/dL      BUN 31 mg/dL      Creatinine 1.48 mg/dL      Sodium 139 mmol/L      Potassium 5.1 mmol/L      Chloride 107 mmol/L      CO2 23.0 mmol/L      Calcium 8.3 mg/dL      BUN/Creatinine Ratio 20.9     " Anion Gap 9.0 mmol/L      eGFR 46.1 mL/min/1.73     Narrative:      GFR Categories in Chronic Kidney Disease (CKD)              GFR Category          GFR (mL/min/1.73)    Interpretation  G1                    90 or greater        Normal or high (1)  G2                    60-89                Mild decrease (1)  G3a                   45-59                Mild to moderate decrease  G3b                   30-44                Moderate to severe decrease  G4                    15-29                Severe decrease  G5                    14 or less           Kidney failure    (1)In the absence of evidence of kidney disease, neither GFR category G1 or G2 fulfill the criteria for CKD.    eGFR calculation 2021 CKD-EPI creatinine equation, which does not include race as a factor    CBC & Differential [[id number]]  (Abnormal) Collected: 05/09/25 0625    Specimen: Blood from Arm, Left Updated: 05/09/25 0714    Narrative:      The following orders were created for panel order CBC & Differential.  Procedure                               Abnormality         Status                     ---------                               -----------         ------                     CBC Auto Differential[[id number]]        Abnormal            Final result                 Please view results for these tests on the individual orders.    CBC Auto Differential [[id number]]  (Abnormal) Collected: 05/09/25 0625    Specimen: Blood from Arm, Left Updated: 05/09/25 0714     WBC 7.86 10*3/mm3      RBC 3.43 10*6/mm3      Hemoglobin 11.0 g/dL      Hematocrit 34.3 %      .0 fL      MCH 32.1 pg      MCHC 32.1 g/dL      RDW 12.6 %      RDW-SD 46.1 fl      MPV 9.4 fL      Platelets 190 10*3/mm3      Neutrophil % 68.2 %      Lymphocyte % 19.5 %      Monocyte % 8.0 %      Eosinophil % 1.8 %      Basophil % 0.5 %      Immature Grans % 2.0 %      Neutrophils, Absolute 5.36 10*3/mm3      Lymphocytes, Absolute 1.53 10*3/mm3      Monocytes, Absolute 0.63 10*3/mm3       Eosinophils, Absolute 0.14 10*3/mm3      Basophils, Absolute 0.04 10*3/mm3      Immature Grans, Absolute 0.16 10*3/mm3      nRBC 0.0 /100 WBC     Body Fluid Culture - Aspirate, Synovium [[id number]] Collected: 05/07/25 1135    Specimen: Aspirate from Synovium Updated: 05/09/25 0710     Body Fluid Culture No growth at 2 days     Gram Stain Moderate (3+) WBCs seen      No organisms seen    POC Glucose Once [[id number]]  (Normal) Collected: 05/09/25 0553    Specimen: Blood Updated: 05/09/25 0554     Glucose 122 mg/dL     POC Glucose Once [[id number]]  (Normal) Collected: 05/08/25 2120    Specimen: Blood Updated: 05/08/25 2122     Glucose 130 mg/dL     POC Glucose Once [[id number]]  (Normal) Collected: 05/08/25 1620    Specimen: Blood Updated: 05/08/25 1622     Glucose 122 mg/dL           Imaging Results (Last 24 Hours)       ** No results found for the last 24 hours. **            Current Facility-Administered Medications:     allopurinol (ZYLOPRIM) tablet 100 mg, 100 mg, Oral, Daily, Ochoa Flores MD, 100 mg at 05/09/25 0917    ascorbic acid (VITAMIN C) tablet 500 mg, 500 mg, Oral, Daily, Ochoa Flores MD, 500 mg at 05/09/25 0917    aspirin chewable tablet 81 mg, 81 mg, Oral, Daily, Ochoa Flores MD, 81 mg at 05/09/25 0918    atorvastatin (LIPITOR) tablet 40 mg, 40 mg, Oral, Nightly, Ochoa Flores MD, 40 mg at 05/08/25 2056    bisacodyl (DULCOLAX) suppository 10 mg, 10 mg, Rectal, Daily PRN, Ochoa Flores MD    carvedilol (COREG) tablet 6.25 mg, 6.25 mg, Oral, BID With Meals, Ochoa Flores MD, 6.25 mg at 05/09/25 0918    cholecalciferol (VITAMIN D3) tablet, , Oral, Daily, Ochoa Flores MD, 1,000 Units at 05/09/25 0918    cloNIDine (CATAPRES) tablet 0.1 mg, 0.1 mg, Oral, Q8H, Ochoa Flores MD, 0.1 mg at 05/09/25 0631    dextrose (D50W) (25 g/50 mL) IV injection 25 g, 25 g, Intravenous, Q15 Min PRN, Ochoa Flores MD    dextrose (GLUTOSE) oral gel 15 g, 15 g, Oral, Q15 Min PRN, Ochoa Flores MD    Diclofenac Sodium  (VOLTAREN) 1 % gel 2 g, 2 g, Topical, TID, Bassem Paul MD, 2 g at 05/09/25 0919    docusate sodium (COLACE) capsule 100 mg, 100 mg, Oral, BID, Ochoa Flores MD, 100 mg at 05/08/25 2057    famotidine (PEPCID) tablet 20 mg, 20 mg, Oral, BID, Ochoa Flores MD, 20 mg at 05/09/25 0917    fluticasone (FLONASE) 50 MCG/ACT nasal spray 2 spray, 2 spray, Nasal, Daily, Ochoa Flores MD, 2 spray at 05/09/25 0919    gabapentin (NEURONTIN) capsule 400 mg, 400 mg, Oral, TID, Ochoa Flores MD, 400 mg at 05/09/25 0918    glucagon (GLUCAGEN) injection 1 mg, 1 mg, Intramuscular, Q15 Min PRN, Ochoa Flores MD    HYDROcodone-acetaminophen (NORCO) 5-325 MG per tablet 1 tablet, 1 tablet, Oral, Q4H PRN, Ochoa Flores MD, 1 tablet at 05/07/25 1244    insulin glargine (LANTUS, SEMGLEE) injection 10 Units, 10 Units, Subcutaneous, Nightly, Ochoa Flores MD, 10 Units at 05/08/25 2057    insulin lispro (HUMALOG/ADMELOG) injection 2-7 Units, 2-7 Units, Subcutaneous, 4x Daily AC & at Bedtime, Ochoa Flores MD, 2 Units at 05/08/25 1147    polyethylene glycol (MIRALAX) packet 17 g, 17 g, Oral, Daily, Ochoa Flores MD, 17 g at 05/08/25 0845    Insert Peripheral IV, , , Once **AND** sodium chloride 0.9 % flush 10 mL, 10 mL, Intravenous, PRN, Ochoa Flores MD    sodium chloride 0.9 % infusion, 75 mL/hr, Intravenous, Continuous, Kunal Rivera MD, Last Rate: 75 mL/hr at 05/09/25 1358, 75 mL/hr at 05/09/25 1358    tamsulosin (FLOMAX) 24 hr capsule 0.8 mg, 0.8 mg, Oral, Daily, Ochoa Flores MD, 0.8 mg at 05/09/25 0918     ASSESSMENT  Acute E. coli emphysematous cystitis  Urinary retention  Bilateral hydronephrosis  Acute kidney injury worsening  Left knee pseudogout with effusion status post aspiration  Paroxysmal atrial fibrillation  Chronic diastolic congestive heart failure  Aortic stenosis status post aortic valve replacement  Diabetes mellitus  Hypertension  Hyperlipidemia  BPH  Neuropathy  Osteoarthritis with left knee effusion  Chronic  kidney disease stage III    PLAN  CPM  IVF  Continue Morrell catheter   Antibiotics completed  Orthopedic surgery consult appreciated  Urology and nephrology to follow patient  Adjust home medications  Stress ulcer DVT prophylaxis  Supportive care  PT OT  Discussed with nursing staff and nephrology  Hold discharge today    OLGA LIDIA LOPEZ MD    Copied text in this note has been reviewed and is accurate as of 05/09/25

## 2025-05-09 NOTE — PROGRESS NOTES
[facility] Progress Note      Patient Name: Vj Bright  : 1940  MRN: [id number]  Primary Care Physician:  Nany Real MD  Date of admission: 5/3/2025    Subjective     Interval History:   The patient was seen and examined today for follow-up on acute kidney  Knee aspirated yesterday  No issues noted today.  Afebrile.  Denies any nausea or vomiting.  No fever no chills    Review of Systems:   As noted above    Objective     Vitals:   Temp:  [97.5 °F (36.4 °C)-98.4 °F (36.9 °C)] 98.4 °F (36.9 °C)  Heart Rate:  [62-67] 65  Resp:  [16] 16  BP: (126-157)/(61-77) 157/75    Intake/Output Summary (Last 24 hours) at 2025 1226  Last data filed at 2025 0300  Gross per 24 hour   Intake --   Output 1300 ml   Net -1300 ml       Physical Exam:    General Appearance: alert, oriented x 3, no acute distress   Skin: warm and dry  HEENT: oral mucosa normal, nonicteric sclera  Neck: supple, no JVD  Lungs: CTA  Heart: RRR, normal S1 and S2  Abdomen: soft, nontender, nondistended  : no palpable bladder  Extremities: no edema, cyanosis or clubbing, right knee painful to touch  Neuro: normal speech and mental status     Scheduled Meds:     allopurinol, 100 mg, Oral, Daily  ascorbic acid, 500 mg, Oral, Daily  aspirin, 81 mg, Oral, Daily  atorvastatin, 40 mg, Oral, Nightly  carvedilol, 6.25 mg, Oral, BID With Meals  cholecalciferol, , Oral, Daily  cloNIDine, 0.1 mg, Oral, Q8H  Diclofenac Sodium, 2 g, Topical, TID  docusate sodium, 100 mg, Oral, BID  famotidine, 20 mg, Oral, BID  fluticasone, 2 spray, Nasal, Daily  gabapentin, 400 mg, Oral, TID  insulin glargine, 10 Units, Subcutaneous, Nightly  insulin lispro, 2-7 Units, Subcutaneous, 4x Daily AC & at Bedtime  polyethylene glycol, 17 g, Oral, Daily  tamsulosin, 0.8 mg, Oral, Daily      IV Meds:   sodium chloride, 75 mL/hr, Last Rate: 75 mL/hr (25 9057)          Results Reviewed:   I have personally reviewed the results from  the time of this admission to 5/9/2025 12:26 EDT     Results from last 7 days   Lab Units 05/09/25  0625 05/08/25  0457 05/07/25  0548 05/06/25  0523 05/05/25  0554   SODIUM mmol/L 139 137 135*   < > 138   POTASSIUM mmol/L 5.1 4.4 4.7   < > 4.4   CHLORIDE mmol/L 107 107 105   < > 107   CO2 mmol/L 23.0 22.4 20.0*   < > 23.3   BUN mg/dL 31* 31* 22   < > 32*   CREATININE mg/dL 1.48* 1.19 1.34*   < > 1.42*   CALCIUM mg/dL 8.3* 8.3* 8.8   < > 8.0*   BILIRUBIN mg/dL  --   --   --   --  0.3   ALK PHOS U/L  --   --   --   --  63   ALT (SGPT) U/L  --   --   --   --  18   AST (SGOT) U/L  --   --   --   --  13   GLUCOSE mg/dL 135* 132* 301*   < > 80    < > = values in this interval not displayed.       Estimated Creatinine Clearance: 41.8 mL/min (A) (by C-G formula based on SCr of 1.48 mg/dL (H)).    Results from last 7 days   Lab Units 05/08/25  0457 05/07/25  0548 05/05/25  0554   MAGNESIUM mg/dL  --   --  2.4   PHOSPHORUS mg/dL 3.6 3.1 3.0       Results from last 7 days   Lab Units 05/06/25  0523 05/05/25  0554   URIC ACID mg/dL 1.4* 1.5*       Results from last 7 days   Lab Units 05/09/25  0625 05/08/25  0457 05/07/25  0548 05/06/25  0523 05/05/25  0554   WBC 10*3/mm3 7.86 12.86* 8.88 7.15 5.93   HEMOGLOBIN g/dL 11.0* 10.5* 11.9* 10.2* 10.3*   PLATELETS 10*3/mm3 190 188 167 165 146             Assessment / Plan     ASSESSMENT:  Acute kidney injury- Secondary to acute urinary obstruction. CT abd/pelvis without revealed emphysematous cystitis and mild bilateral hydro. Renal function improved with placement of lopez catheter.  Electrolytes are acceptable. UA positive for UTI  Chronic kidney disease stage IIIb- Etiology secondary to hypertensive nephrosclerosis, diabetic nephropathy and chronic bladder outlet obstruction from BPH. Followed by Dr. SHUN Garvey with out group as an outpatient. Baseline creatinine fluctuates between 1.4-1.8 mg/dl.   Emphysematous cystitis- Urine culture revealed >100,000 cfu gram negative  bacilli.  Antibiotic by primary and urology  BPH- on BID flomax as an outpatient. Followed by Urology.   Hypertensive chronic kidney disease-blood pressure is acceptable   Type II diabetes mellitus- Per primary team. Patient is on Farxiga as an outpatient. Currently being held due to JUDY. Would recommend continuing to hold until infection is cleared given increased risk for  infections with SGLT-2i medications.   Anemia in chronic kidney disease- complicated by acute blood loss from hematuria.  Hemoglobin stable  Hyperuricemia on Uloric.  uric acid down to 1.5  Pseudogout        PLAN:  Kidney function worse today  Hypervolemic on examination  Will start gentle IV hydration  Recheck labs in a.m.    Thank you for involving us in the care of Vj Bright.  Please feel free to call with any questions.    Kunal Rivera MD  05/09/25  12:26 EDT    [facility]  [phone]    Parts of this note may be an electronic transcription/translation of spoken language to printed text using the Dragon dictation system.

## 2025-05-09 NOTE — CASE MANAGEMENT/SOCIAL WORK
Continued Stay Note  [facility]     Patient Name: Vj Bright  MRN: [id number]  Today's Date: 5/9/2025    Admit Date: 5/3/2025    Plan: [facility] acute [facility]   Discharge Plan       Row Name 05/09/25 1742       Plan    Plan [facility]    Patient/Family in Agreement with Plan yes    Plan Comments Met with pt in room. [facility] declined due to bed avail. Pt is agreeable to [facility]. Spoke with [facility] who stated that they can accept and have a bed available. Updated primary RN and MD. Packet complete and in pt cubby. He will need  van transport. No further needs assessed at this time. CCP following. Alex RAMEY RN                   Discharge Codes    No documentation.                 Expected Discharge Date and Time       Expected Discharge Date Expected Discharge Time    May 10, 2025               Alex Phelps RN

## 2025-05-09 NOTE — PROGRESS NOTES
Patient seen and examined.  Left knee continues to improve.    Left knee examined swelling decreased range of motion improved he is flexing it more.  Compartment soft compressible.      Labs reviewed cultures negative to date.    Assessment; left knee pseudogout, pain and swelling    Plan    Recommend continue conservative treatment.  Labs currently showing culture negative to date.  Patient improving.  Follow-up orthopedics as outpatient.  If any significant changes let us know.  Please call for questions or concerns in Q

## 2025-05-09 NOTE — PLAN OF CARE
AAOX4. RA. SR. Creat increased delaying discharge. IV fluids initiated. Adequate urine output in lopez catheter. Possible discharge tomorrow. Denies pain all shift.       Goal Outcome Evaluation:  Plan of Care Reviewed With: patient        Progress: improving          Problem: Adult Inpatient Plan of Care  Goal: Plan of Care Review  Outcome: Progressing  Flowsheets (Taken 5/9/2025 5990)  Progress: improving  Plan of Care Reviewed With: patient

## 2025-05-09 NOTE — PLAN OF CARE
Problem: Adult Inpatient Plan of Care  Goal: Plan of Care Review  Outcome: Progressing  Flowsheets (Taken 5/9/2025 0038)  Progress: improving  Outcome Evaluation: pt resting on and off w eyes closed, denies pain, awaiting placement for rehab   Goal Outcome Evaluation:           Progress: improving  Outcome Evaluation: pt resting on and off w eyes closed, denies pain, awaiting placement for rehab

## 2025-05-10 LAB
ALBUMIN SERPL-MCNC: 2.4 G/DL (ref 3.5–5.2)
ANION GAP SERPL CALCULATED.3IONS-SCNC: 8 MMOL/L (ref 5–15)
BASOPHILS # BLD AUTO: 0.07 10*3/MM3 (ref 0–0.2)
BASOPHILS NFR BLD AUTO: 1.1 % (ref 0–1.5)
BUN SERPL-MCNC: 24 MG/DL (ref 8–23)
BUN/CREAT SERPL: 22 (ref 7–25)
CALCIUM SPEC-SCNC: 8.1 MG/DL (ref 8.6–10.5)
CHLORIDE SERPL-SCNC: 108 MMOL/L (ref 98–107)
CO2 SERPL-SCNC: 22 MMOL/L (ref 22–29)
CREAT SERPL-MCNC: 1.09 MG/DL (ref 0.76–1.27)
DEPRECATED RDW RBC AUTO: 44.9 FL (ref 37–54)
EGFRCR SERPLBLD CKD-EPI 2021: 66.5 ML/MIN/1.73
EOSINOPHIL # BLD AUTO: 0.18 10*3/MM3 (ref 0–0.4)
EOSINOPHIL NFR BLD AUTO: 2.7 % (ref 0.3–6.2)
ERYTHROCYTE [DISTWIDTH] IN BLOOD BY AUTOMATED COUNT: 12.7 % (ref 12.3–15.4)
GLUCOSE BLDC GLUCOMTR-MCNC: 122 MG/DL (ref 70–130)
GLUCOSE BLDC GLUCOMTR-MCNC: 135 MG/DL (ref 70–130)
GLUCOSE BLDC GLUCOMTR-MCNC: 144 MG/DL (ref 70–130)
GLUCOSE BLDC GLUCOMTR-MCNC: 94 MG/DL (ref 70–130)
GLUCOSE SERPL-MCNC: 129 MG/DL (ref 65–99)
HCT VFR BLD AUTO: 30.8 % (ref 37.5–51)
HGB BLD-MCNC: 10.2 G/DL (ref 13–17.7)
IMM GRANULOCYTES # BLD AUTO: 0.2 10*3/MM3 (ref 0–0.05)
IMM GRANULOCYTES NFR BLD AUTO: 3 % (ref 0–0.5)
LYMPHOCYTES # BLD AUTO: 1.65 10*3/MM3 (ref 0.7–3.1)
LYMPHOCYTES NFR BLD AUTO: 24.8 % (ref 19.6–45.3)
MCH RBC QN AUTO: 31.8 PG (ref 26.6–33)
MCHC RBC AUTO-ENTMCNC: 33.1 G/DL (ref 31.5–35.7)
MCV RBC AUTO: 96 FL (ref 79–97)
MONOCYTES # BLD AUTO: 0.53 10*3/MM3 (ref 0.1–0.9)
MONOCYTES NFR BLD AUTO: 8 % (ref 5–12)
NEUTROPHILS NFR BLD AUTO: 4.02 10*3/MM3 (ref 1.7–7)
NEUTROPHILS NFR BLD AUTO: 60.4 % (ref 42.7–76)
NRBC BLD AUTO-RTO: 0 /100 WBC (ref 0–0.2)
PHOSPHATE SERPL-MCNC: 3.7 MG/DL (ref 2.5–4.5)
PLATELET # BLD AUTO: 184 10*3/MM3 (ref 140–450)
PMV BLD AUTO: 9.1 FL (ref 6–12)
POTASSIUM SERPL-SCNC: 4.3 MMOL/L (ref 3.5–5.2)
RBC # BLD AUTO: 3.21 10*6/MM3 (ref 4.14–5.8)
SODIUM SERPL-SCNC: 138 MMOL/L (ref 136–145)
WBC NRBC COR # BLD AUTO: 6.65 10*3/MM3 (ref 3.4–10.8)

## 2025-05-10 PROCEDURE — 63710000001 INSULIN GLARGINE PER 5 UNITS: Performed by: HOSPITALIST

## 2025-05-10 PROCEDURE — 80069 RENAL FUNCTION PANEL: CPT | Performed by: HOSPITALIST

## 2025-05-10 PROCEDURE — 82948 REAGENT STRIP/BLOOD GLUCOSE: CPT

## 2025-05-10 PROCEDURE — 85025 COMPLETE CBC W/AUTO DIFF WBC: CPT | Performed by: HOSPITALIST

## 2025-05-10 RX ORDER — FUROSEMIDE 20 MG/1
20 TABLET ORAL 3 TIMES WEEKLY
Status: DISCONTINUED | OUTPATIENT
Start: 2025-05-12 | End: 2025-05-11 | Stop reason: HOSPADM

## 2025-05-10 RX ORDER — ATORVASTATIN CALCIUM 20 MG/1
10 TABLET, FILM COATED ORAL NIGHTLY
Status: DISCONTINUED | OUTPATIENT
Start: 2025-05-10 | End: 2025-05-11 | Stop reason: HOSPADM

## 2025-05-10 RX ADMIN — DICLOFENAC SODIUM 2 G: 10 GEL TOPICAL at 08:06

## 2025-05-10 RX ADMIN — GABAPENTIN 400 MG: 400 CAPSULE ORAL at 21:03

## 2025-05-10 RX ADMIN — CLONIDINE HYDROCHLORIDE 0.1 MG: 0.1 TABLET ORAL at 21:03

## 2025-05-10 RX ADMIN — CARVEDILOL 6.25 MG: 6.25 TABLET, FILM COATED ORAL at 18:02

## 2025-05-10 RX ADMIN — DOCUSATE SODIUM 100 MG: 100 CAPSULE, LIQUID FILLED ORAL at 21:04

## 2025-05-10 RX ADMIN — INSULIN GLARGINE 10 UNITS: 100 INJECTION, SOLUTION SUBCUTANEOUS at 21:15

## 2025-05-10 RX ADMIN — CLONIDINE HYDROCHLORIDE 0.1 MG: 0.1 TABLET ORAL at 14:53

## 2025-05-10 RX ADMIN — GABAPENTIN 400 MG: 400 CAPSULE ORAL at 18:02

## 2025-05-10 RX ADMIN — Medication 1000 UNITS: at 08:05

## 2025-05-10 RX ADMIN — FAMOTIDINE 20 MG: 20 TABLET, FILM COATED ORAL at 21:03

## 2025-05-10 RX ADMIN — CLONIDINE HYDROCHLORIDE 0.1 MG: 0.1 TABLET ORAL at 06:30

## 2025-05-10 RX ADMIN — OXYCODONE HYDROCHLORIDE AND ACETAMINOPHEN 500 MG: 500 TABLET ORAL at 08:05

## 2025-05-10 RX ADMIN — GABAPENTIN 400 MG: 400 CAPSULE ORAL at 08:04

## 2025-05-10 RX ADMIN — ATORVASTATIN CALCIUM 10 MG: 20 TABLET, FILM COATED ORAL at 21:04

## 2025-05-10 RX ADMIN — FAMOTIDINE 20 MG: 20 TABLET, FILM COATED ORAL at 08:05

## 2025-05-10 RX ADMIN — TAMSULOSIN HYDROCHLORIDE 0.8 MG: 0.4 CAPSULE ORAL at 08:04

## 2025-05-10 RX ADMIN — ALLOPURINOL 100 MG: 100 TABLET ORAL at 08:05

## 2025-05-10 RX ADMIN — CARVEDILOL 6.25 MG: 6.25 TABLET, FILM COATED ORAL at 08:04

## 2025-05-10 RX ADMIN — FLUTICASONE PROPIONATE 2 SPRAY: 50 SPRAY, METERED NASAL at 08:06

## 2025-05-10 RX ADMIN — ASPIRIN 81 MG CHEWABLE TABLET 81 MG: 81 TABLET CHEWABLE at 08:04

## 2025-05-10 NOTE — PLAN OF CARE
Goal Outcome Evaluation:  VSS. No c/o pain. Repositioned every 2 hours. FC with good UOP. Plan to dc to acute rehab today. Plan of care ongoing.

## 2025-05-10 NOTE — PROGRESS NOTES
"Daily progress note    Primary care physician      Subjective  Doing same with no new complaints    History of present illness  84-year-old -American male with history of aortic stenosis coronary artery disease congestive heart failure diabetes hypertension hyperlipidemia atrial fibrillation pulmonary hypertension and chronic kidney disease presented to [facility] emergency room with dysuria for last 1 week which is getting worse.  Patient also have lower abdominal pain with nausea but no vomiting diarrhea.  Patient workup in ER revealed emphysematous cystitis with acute kidney injury and urine retention with bilateral hydronephrosis admitted for management.  Patient has no chest pain shortness of breath palpitation.     REVIEW OF SYSTEMS  Unremarkable except weakness     PHYSICAL EXAM  Blood pressure 169/75, pulse 60, temperature 97.7 °F (36.5 °C), temperature source Oral, resp. rate 16, height 182.9 cm (72\"), weight 80.9 kg (178 lb 5.6 oz), SpO2 100%.    GENERAL: Awake and alert no acute distress  HENT: nares patent  EYES: no scleral icterus  CV: regular rhythm, normal rate  RESPIRATORY: normal effort and moving air bilaterally  ABDOMEN: soft, suprapubic tenderness, bowel sounds positive  MUSCULOSKELETAL: no deformity  NEURO: alert, moves all extremities, follows commands  PSYCH:  calm, cooperative  SKIN: warm, dry     LAB RESULTS  Lab Results (last 24 hours)       Procedure Component Value Units Date/Time    POC Glucose Once [[id number]]  (Normal) Collected: 05/10/25 1124    Specimen: Blood Updated: 05/10/25 1126     Glucose 122 mg/dL     Body Fluid Culture - Aspirate, Synovium [[id number]] Collected: 05/07/25 1135    Specimen: Aspirate from Synovium Updated: 05/10/25 0843     Body Fluid Culture No growth at 3 days     Gram Stain Moderate (3+) WBCs seen      No organisms seen    Anaerobic Culture - Aspirate, Knee, Left [[id number]]  (Normal) Collected: 05/07/25 1135    Specimen: Aspirate " from Knee, Left Updated: 05/10/25 0744     Anaerobic Culture No anaerobes isolated at 3 days    Renal Function Panel [[id number]]  (Abnormal) Collected: 05/10/25 0520    Specimen: Blood Updated: 05/10/25 0620     Glucose 129 mg/dL      BUN 24 mg/dL      Creatinine 1.09 mg/dL      Sodium 138 mmol/L      Potassium 4.3 mmol/L      Chloride 108 mmol/L      CO2 22.0 mmol/L      Calcium 8.1 mg/dL      Albumin 2.4 g/dL      Phosphorus 3.7 mg/dL      Anion Gap 8.0 mmol/L      BUN/Creatinine Ratio 22.0     eGFR 66.5 mL/min/1.73     Narrative:      GFR Categories in Chronic Kidney Disease (CKD)              GFR Category          GFR (mL/min/1.73)    Interpretation  G1                    90 or greater        Normal or high (1)  G2                    60-89                Mild decrease (1)  G3a                   45-59                Mild to moderate decrease  G3b                   30-44                Moderate to severe decrease  G4                    15-29                Severe decrease  G5                    14 or less           Kidney failure    (1)In the absence of evidence of kidney disease, neither GFR category G1 or G2 fulfill the criteria for CKD.    eGFR calculation 2021 CKD-EPI creatinine equation, which does not include race as a factor    POC Glucose Once [[id number]]  (Abnormal) Collected: 05/10/25 0612    Specimen: Blood Updated: 05/10/25 0614     Glucose 144 mg/dL     CBC & Differential [[id number]]  (Abnormal) Collected: 05/10/25 0520    Specimen: Blood Updated: 05/10/25 0605    Narrative:      The following orders were created for panel order CBC & Differential.  Procedure                               Abnormality         Status                     ---------                               -----------         ------                     CBC Auto Differential[[id number]]        Abnormal            Final result                 Please view results for these tests on the individual orders.    CBC Auto Differential  [[id number]]  (Abnormal) Collected: 05/10/25 0520    Specimen: Blood Updated: 05/10/25 0605     WBC 6.65 10*3/mm3      RBC 3.21 10*6/mm3      Hemoglobin 10.2 g/dL      Hematocrit 30.8 %      MCV 96.0 fL      MCH 31.8 pg      MCHC 33.1 g/dL      RDW 12.7 %      RDW-SD 44.9 fl      MPV 9.1 fL      Platelets 184 10*3/mm3      Neutrophil % 60.4 %      Lymphocyte % 24.8 %      Monocyte % 8.0 %      Eosinophil % 2.7 %      Basophil % 1.1 %      Immature Grans % 3.0 %      Neutrophils, Absolute 4.02 10*3/mm3      Lymphocytes, Absolute 1.65 10*3/mm3      Monocytes, Absolute 0.53 10*3/mm3      Eosinophils, Absolute 0.18 10*3/mm3      Basophils, Absolute 0.07 10*3/mm3      Immature Grans, Absolute 0.20 10*3/mm3      nRBC 0.0 /100 WBC     POC Glucose Once [[id number]]  (Abnormal) Collected: 05/09/25 2040    Specimen: Blood Updated: 05/09/25 2042     Glucose 157 mg/dL     Renal Function Panel [[id number]]  (Abnormal) Collected: 05/09/25 1614    Specimen: Blood Updated: 05/09/25 1651     Glucose 175 mg/dL      BUN 30 mg/dL      Creatinine 1.44 mg/dL      Sodium 136 mmol/L      Potassium 4.4 mmol/L      Chloride 104 mmol/L      CO2 22.6 mmol/L      Calcium 8.7 mg/dL      Albumin 2.9 g/dL      Phosphorus 3.7 mg/dL      Anion Gap 9.4 mmol/L      BUN/Creatinine Ratio 20.8     eGFR 47.6 mL/min/1.73     Narrative:      GFR Categories in Chronic Kidney Disease (CKD)              GFR Category          GFR (mL/min/1.73)    Interpretation  G1                    90 or greater        Normal or high (1)  G2                    60-89                Mild decrease (1)  G3a                   45-59                Mild to moderate decrease  G3b                   30-44                Moderate to severe decrease  G4                    15-29                Severe decrease  G5                    14 or less           Kidney failure    (1)In the absence of evidence of kidney disease, neither GFR category G1 or G2 fulfill the criteria for CKD.    eGFR  calculation 2021 CKD-EPI creatinine equation, which does not include race as a factor    POC Glucose Once [[id number]]  (Abnormal) Collected: 05/09/25 1622    Specimen: Blood Updated: 05/09/25 1624     Glucose 145 mg/dL           Imaging Results (Last 24 Hours)       ** No results found for the last 24 hours. **            Current Facility-Administered Medications:     allopurinol (ZYLOPRIM) tablet 100 mg, 100 mg, Oral, Daily, Ochoa Flores MD, 100 mg at 05/10/25 0805    ascorbic acid (VITAMIN C) tablet 500 mg, 500 mg, Oral, Daily, Ochoa Flores MD, 500 mg at 05/10/25 0805    aspirin chewable tablet 81 mg, 81 mg, Oral, Daily, Ochoa Flores MD, 81 mg at 05/10/25 0804    atorvastatin (LIPITOR) tablet 40 mg, 40 mg, Oral, Nightly, Ochoa Flores MD, 40 mg at 05/09/25 2000    bisacodyl (DULCOLAX) suppository 10 mg, 10 mg, Rectal, Daily PRN, Ochoa Flores MD    carvedilol (COREG) tablet 6.25 mg, 6.25 mg, Oral, BID With Meals, Ochoa Flores MD, 6.25 mg at 05/10/25 0804    cholecalciferol (VITAMIN D3) tablet, , Oral, Daily, Ochoa Flores MD, 1,000 Units at 05/10/25 0805    cloNIDine (CATAPRES) tablet 0.1 mg, 0.1 mg, Oral, Q8H, Ochoa Flores MD, 0.1 mg at 05/10/25 0630    dextrose (D50W) (25 g/50 mL) IV injection 25 g, 25 g, Intravenous, Q15 Min PRN, Ochoa Flores MD    dextrose (GLUTOSE) oral gel 15 g, 15 g, Oral, Q15 Min PRN, Ochoa Flores MD    Diclofenac Sodium (VOLTAREN) 1 % gel 2 g, 2 g, Topical, TID, Bassem Paul MD, 2 g at 05/10/25 0806    docusate sodium (COLACE) capsule 100 mg, 100 mg, Oral, BID, Ochoa Flores MD, 100 mg at 05/08/25 2057    famotidine (PEPCID) tablet 20 mg, 20 mg, Oral, BID, Ochoa Flores MD, 20 mg at 05/10/25 0805    fluticasone (FLONASE) 50 MCG/ACT nasal spray 2 spray, 2 spray, Nasal, Daily, Ochoa Flores MD, 2 spray at 05/10/25 0806    [START ON 5/12/2025] furosemide (LASIX) tablet 20 mg, 20 mg, Oral, Once per day on Monday Wednesday Friday, Froilan Marin MD    gabapentin  (NEURONTIN) capsule 400 mg, 400 mg, Oral, TID, Olga Lidai Flores MD, 400 mg at 05/10/25 0804    glucagon (GLUCAGEN) injection 1 mg, 1 mg, Intramuscular, Q15 Min PRN, Olga Lidia Flores MD    HYDROcodone-acetaminophen (NORCO) 5-325 MG per tablet 1 tablet, 1 tablet, Oral, Q4H PRN, Olga Lidia Flores MD, 1 tablet at 05/07/25 1244    insulin glargine (LANTUS, SEMGLEE) injection 10 Units, 10 Units, Subcutaneous, Nightly, Olga Lidia Flores MD, 10 Units at 05/09/25 2151    insulin lispro (HUMALOG/ADMELOG) injection 2-7 Units, 2-7 Units, Subcutaneous, 4x Daily AC & at Bedtime, Olga Lidia Flores MD, 2 Units at 05/08/25 1147    polyethylene glycol (MIRALAX) packet 17 g, 17 g, Oral, Daily, Olga Lidia Flores MD, 17 g at 05/08/25 0845    Insert Peripheral IV, , , Once **AND** sodium chloride 0.9 % flush 10 mL, 10 mL, Intravenous, PRN, Olga Lidia Flores MD    tamsulosin (FLOMAX) 24 hr capsule 0.8 mg, 0.8 mg, Oral, Daily, Olga Lidia Flores MD, 0.8 mg at 05/10/25 0804     ASSESSMENT  Acute E. coli emphysematous cystitis  Urinary retention  Bilateral hydronephrosis  Acute kidney injury worsening  Left knee pseudogout with effusion status post aspiration  Paroxysmal atrial fibrillation  Chronic diastolic congestive heart failure  Aortic stenosis status post aortic valve replacement  Diabetes mellitus  Hypertension  Hyperlipidemia  BPH  Neuropathy  Osteoarthritis with left knee effusion  Chronic kidney disease stage III    PLAN  CPM  Discontinue IV fluid  Continue Morrell catheter   Antibiotics completed  Orthopedic surgery consult appreciated  Urology and nephrology to follow patient  Adjust home medications  Stress ulcer DVT prophylaxis  Supportive care  PT OT  Discussed with nursing staff and nephrology  Discharge to skilled nursing facility in a.m.    OLGA LIDIA FLORES MD    Copied text in this note has been reviewed and is accurate as of 05/10/25

## 2025-05-10 NOTE — PLAN OF CARE
Goal Outcome Evaluation:  Plan of Care Reviewed With: patient        Progress: improving  Outcome Evaluation: Monitor pain,labs,and vitals. VSS. No c/o pain on current shift. Up to chair. Possible D/C to rehab.

## 2025-05-10 NOTE — PROGRESS NOTES
[facility] Progress Note      Patient Name: Vj Bright  : 1940  MRN: [id number]  Primary Care Physician:  Nany Real MD  Date of admission: 5/3/2025    Subjective     Interval History:   F/u JUDY    Review of Systems:   I/O 1.8/1.3  Denies dyspnea or nausea    Objective     Vitals:   Temp:  [97.7 °F (36.5 °C)-98.8 °F (37.1 °C)] 98.3 °F (36.8 °C)  Heart Rate:  [60-71] 60  Resp:  [16] 16  BP: (136-157)/(67-78) 148/69    Intake/Output Summary (Last 24 hours) at 5/10/2025 0651  Last data filed at 5/10/2025 0221  Gross per 24 hour   Intake 1857 ml   Output 1350 ml   Net 507 ml       Physical Exam:    General Appearance: alert, comfortable on RA  Neck: supple, no JVD  Lungs: CTA bilat no rales  Heart: RRR, normal S1 and S2  Abdomen: soft, nontender, nondistended   lopez  Extremities:trace BLE edema    Scheduled Meds:     allopurinol, 100 mg, Oral, Daily  ascorbic acid, 500 mg, Oral, Daily  aspirin, 81 mg, Oral, Daily  atorvastatin, 40 mg, Oral, Nightly  carvedilol, 6.25 mg, Oral, BID With Meals  cholecalciferol, , Oral, Daily  cloNIDine, 0.1 mg, Oral, Q8H  Diclofenac Sodium, 2 g, Topical, TID  docusate sodium, 100 mg, Oral, BID  famotidine, 20 mg, Oral, BID  fluticasone, 2 spray, Nasal, Daily  gabapentin, 400 mg, Oral, TID  insulin glargine, 10 Units, Subcutaneous, Nightly  insulin lispro, 2-7 Units, Subcutaneous, 4x Daily AC & at Bedtime  polyethylene glycol, 17 g, Oral, Daily  tamsulosin, 0.8 mg, Oral, Daily      IV Meds:        Results Reviewed:   I have personally reviewed the results from the time of this admission to 5/10/2025 06:51 EDT     Results from last 7 days   Lab Units 05/10/25  0520 25  1614 25  0625 25  0523 25  0554   SODIUM mmol/L 138 136 139   < > 138   POTASSIUM mmol/L 4.3 4.4 5.1   < > 4.4   CHLORIDE mmol/L 108* 104 107   < > 107   CO2 mmol/L 22.0 22.6 23.0   < > 23.3   BUN mg/dL 24* 30* 31*   < > 32*   CREATININE mg/dL 1.09  1.44* 1.48*   < > 1.42*   CALCIUM mg/dL 8.1* 8.7 8.3*   < > 8.0*   BILIRUBIN mg/dL  --   --   --   --  0.3   ALK PHOS U/L  --   --   --   --  63   ALT (SGPT) U/L  --   --   --   --  18   AST (SGOT) U/L  --   --   --   --  13   GLUCOSE mg/dL 129* 175* 135*   < > 80    < > = values in this interval not displayed.     Estimated Creatinine Clearance: 56.7 mL/min (by C-G formula based on SCr of 1.09 mg/dL).  Results from last 7 days   Lab Units 05/10/25  0520 05/09/25  1614 05/08/25  0457 05/07/25  0548 05/05/25  0554   MAGNESIUM mg/dL  --   --   --   --  2.4   PHOSPHORUS mg/dL 3.7 3.7 3.6   < > 3.0    < > = values in this interval not displayed.     Results from last 7 days   Lab Units 05/06/25  0523 05/05/25  0554   URIC ACID mg/dL 1.4* 1.5*     Results from last 7 days   Lab Units 05/10/25  0520 05/09/25  0625 05/08/25  0457 05/07/25  0548 05/06/25  0523   WBC 10*3/mm3 6.65 7.86 12.86* 8.88 7.15   HEMOGLOBIN g/dL 10.2* 11.0* 10.5* 11.9* 10.2*   PLATELETS 10*3/mm3 184 190 188 167 165           Assessment / Plan     ASSESSMENT:  Acute kidney injury - Secondary to acute urinary obstruction. CT abd/pelvis without revealed emphysematous cystitis and mild bilateral hydro. Renal function improved initially w placement of lopez catheter but Cr climbed to 1.4 yesterday prompting IVF - better today 1.1, lower than BL.  Mild periph edema with low alb (2.4) and diuretic on hold  Chronic kidney disease stage IIIb- Etiology secondary to hypertensive nephrosclerosis, diabetic nephropathy and chronic bladder outlet obstruction from BPH. Followed by Dr. SHUN Garvey with out group as an outpatient. Baseline creatinine fluctuates between 1.4-1.8 mg/dl.   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  Antibiotic by primary and urology (off abx?) . Lopez to remain  BPH - on flomax  Hypertensive chronic kidney disease-blood pressure bit high at times 140 to 150 range  Type II diabetes mellitus- Per  primary team. Patient is on Farxiga as an outpatient. Currently being held due to JUDY. Would recommend continuing to hold until infection is cleared given increased risk for  infections with SGLT-2i medications.   Anemia in chronic kidney disease - hgb stable  Pseudogout    PLAN:  Was going to resume losartan at lower dose ie 25 mg but listed as allergy (angioedema) but appears been taking it prior to admission - may want to clarify this in office  Resume lasix at lower dose 20 mg MWF upon discharge  No objection to discharge today from nephrology standpoint  Will arrange follow up with Dr Veto Marin MD  05/10/25  06:51 EDT    [facility]  [phone]

## 2025-05-11 VITALS
TEMPERATURE: 98.9 F | DIASTOLIC BLOOD PRESSURE: 76 MMHG | BODY MASS INDEX: 24.16 KG/M2 | OXYGEN SATURATION: 98 % | HEART RATE: 62 BPM | HEIGHT: 72 IN | RESPIRATION RATE: 16 BRPM | SYSTOLIC BLOOD PRESSURE: 152 MMHG | WEIGHT: 178.35 LBS

## 2025-05-11 LAB
ANION GAP SERPL CALCULATED.3IONS-SCNC: 8.7 MMOL/L (ref 5–15)
BASOPHILS # BLD AUTO: 0.04 10*3/MM3 (ref 0–0.2)
BASOPHILS NFR BLD AUTO: 0.6 % (ref 0–1.5)
BUN SERPL-MCNC: 20 MG/DL (ref 8–23)
BUN/CREAT SERPL: 19.8 (ref 7–25)
CALCIUM SPEC-SCNC: 8.5 MG/DL (ref 8.6–10.5)
CHLORIDE SERPL-SCNC: 107 MMOL/L (ref 98–107)
CO2 SERPL-SCNC: 22.3 MMOL/L (ref 22–29)
CREAT SERPL-MCNC: 1.01 MG/DL (ref 0.76–1.27)
DEPRECATED RDW RBC AUTO: 48 FL (ref 37–54)
EGFRCR SERPLBLD CKD-EPI 2021: 72.9 ML/MIN/1.73
EOSINOPHIL # BLD AUTO: 0.23 10*3/MM3 (ref 0–0.4)
EOSINOPHIL NFR BLD AUTO: 3.4 % (ref 0.3–6.2)
ERYTHROCYTE [DISTWIDTH] IN BLOOD BY AUTOMATED COUNT: 13.2 % (ref 12.3–15.4)
GLUCOSE BLDC GLUCOMTR-MCNC: 102 MG/DL (ref 70–130)
GLUCOSE BLDC GLUCOMTR-MCNC: 123 MG/DL (ref 70–130)
GLUCOSE SERPL-MCNC: 107 MG/DL (ref 65–99)
HCT VFR BLD AUTO: 34.4 % (ref 37.5–51)
HGB BLD-MCNC: 10.8 G/DL (ref 13–17.7)
IMM GRANULOCYTES # BLD AUTO: 0.25 10*3/MM3 (ref 0–0.05)
IMM GRANULOCYTES NFR BLD AUTO: 3.7 % (ref 0–0.5)
LYMPHOCYTES # BLD AUTO: 1.54 10*3/MM3 (ref 0.7–3.1)
LYMPHOCYTES NFR BLD AUTO: 23.1 % (ref 19.6–45.3)
MCH RBC QN AUTO: 31.3 PG (ref 26.6–33)
MCHC RBC AUTO-ENTMCNC: 31.4 G/DL (ref 31.5–35.7)
MCV RBC AUTO: 99.7 FL (ref 79–97)
MONOCYTES # BLD AUTO: 0.51 10*3/MM3 (ref 0.1–0.9)
MONOCYTES NFR BLD AUTO: 7.6 % (ref 5–12)
NEUTROPHILS NFR BLD AUTO: 4.1 10*3/MM3 (ref 1.7–7)
NEUTROPHILS NFR BLD AUTO: 61.6 % (ref 42.7–76)
NRBC BLD AUTO-RTO: 0 /100 WBC (ref 0–0.2)
PLATELET # BLD AUTO: 196 10*3/MM3 (ref 140–450)
PMV BLD AUTO: 9 FL (ref 6–12)
POTASSIUM SERPL-SCNC: 4.4 MMOL/L (ref 3.5–5.2)
RBC # BLD AUTO: 3.45 10*6/MM3 (ref 4.14–5.8)
SODIUM SERPL-SCNC: 138 MMOL/L (ref 136–145)
WBC NRBC COR # BLD AUTO: 6.67 10*3/MM3 (ref 3.4–10.8)

## 2025-05-11 PROCEDURE — 85025 COMPLETE CBC W/AUTO DIFF WBC: CPT | Performed by: HOSPITALIST

## 2025-05-11 PROCEDURE — 82948 REAGENT STRIP/BLOOD GLUCOSE: CPT

## 2025-05-11 PROCEDURE — 80048 BASIC METABOLIC PNL TOTAL CA: CPT | Performed by: HOSPITALIST

## 2025-05-11 RX ORDER — FAMOTIDINE 20 MG/1
20 TABLET, FILM COATED ORAL 2 TIMES DAILY
Qty: 60 TABLET | Refills: 0 | Status: SHIPPED | OUTPATIENT
Start: 2025-05-11 | End: 2025-06-10

## 2025-05-11 RX ORDER — FUROSEMIDE 20 MG/1
20 TABLET ORAL 3 TIMES WEEKLY
Qty: 12 TABLET | Refills: 0 | Status: SHIPPED | OUTPATIENT
Start: 2025-05-12 | End: 2025-06-11

## 2025-05-11 RX ORDER — ATORVASTATIN CALCIUM 10 MG/1
10 TABLET, FILM COATED ORAL NIGHTLY
Qty: 30 TABLET | Refills: 0 | Status: SHIPPED | OUTPATIENT
Start: 2025-05-11 | End: 2025-06-10

## 2025-05-11 RX ORDER — POLYETHYLENE GLYCOL 3350 17 G/17G
17 POWDER, FOR SOLUTION ORAL DAILY
Qty: 30 PACKET | Refills: 0 | Status: SHIPPED | OUTPATIENT
Start: 2025-05-12 | End: 2025-06-11

## 2025-05-11 RX ORDER — CLONIDINE HYDROCHLORIDE 0.1 MG/1
0.1 TABLET ORAL EVERY 8 HOURS SCHEDULED
Qty: 90 TABLET | Refills: 0 | Status: SHIPPED | OUTPATIENT
Start: 2025-05-11 | End: 2025-06-10

## 2025-05-11 RX ORDER — PSEUDOEPHEDRINE HCL 30 MG
100 TABLET ORAL 2 TIMES DAILY
Qty: 60 CAPSULE | Refills: 0 | Status: SHIPPED | OUTPATIENT
Start: 2025-05-11 | End: 2025-06-10

## 2025-05-11 RX ORDER — ALLOPURINOL 100 MG/1
100 TABLET ORAL DAILY
Qty: 30 TABLET | Refills: 0 | Status: SHIPPED | OUTPATIENT
Start: 2025-05-12 | End: 2025-06-11

## 2025-05-11 RX ADMIN — CLONIDINE HYDROCHLORIDE 0.1 MG: 0.1 TABLET ORAL at 05:54

## 2025-05-11 RX ADMIN — ALLOPURINOL 100 MG: 100 TABLET ORAL at 09:23

## 2025-05-11 RX ADMIN — DICLOFENAC SODIUM 2 G: 10 GEL TOPICAL at 09:24

## 2025-05-11 RX ADMIN — ASPIRIN 81 MG CHEWABLE TABLET 81 MG: 81 TABLET CHEWABLE at 09:24

## 2025-05-11 RX ADMIN — GABAPENTIN 400 MG: 400 CAPSULE ORAL at 09:24

## 2025-05-11 RX ADMIN — FAMOTIDINE 20 MG: 20 TABLET, FILM COATED ORAL at 09:24

## 2025-05-11 RX ADMIN — Medication 1000 UNITS: at 09:23

## 2025-05-11 RX ADMIN — TAMSULOSIN HYDROCHLORIDE 0.8 MG: 0.4 CAPSULE ORAL at 09:24

## 2025-05-11 RX ADMIN — OXYCODONE HYDROCHLORIDE AND ACETAMINOPHEN 500 MG: 500 TABLET ORAL at 09:23

## 2025-05-11 RX ADMIN — CARVEDILOL 6.25 MG: 6.25 TABLET, FILM COATED ORAL at 09:24

## 2025-05-11 NOTE — PROGRESS NOTES
"Daily progress note    Primary care physician      Subjective  Doing better with no new complaints    History of present illness  84-year-old -American male with history of aortic stenosis coronary artery disease congestive heart failure diabetes hypertension hyperlipidemia atrial fibrillation pulmonary hypertension and chronic kidney disease presented to [facility] emergency room with dysuria for last 1 week which is getting worse.  Patient also have lower abdominal pain with nausea but no vomiting diarrhea.  Patient workup in ER revealed emphysematous cystitis with acute kidney injury and urine retention with bilateral hydronephrosis admitted for management.  Patient has no chest pain shortness of breath palpitation.     REVIEW OF SYSTEMS  Unremarkable except weakness     PHYSICAL EXAM  Blood pressure 140/74, pulse 68, temperature 98.3 °F (36.8 °C), temperature source Oral, resp. rate 16, height 182.9 cm (72\"), weight 80.9 kg (178 lb 5.6 oz), SpO2 98%.    GENERAL: Awake and alert no acute distress  HENT: nares patent  EYES: no scleral icterus  CV: regular rhythm, normal rate  RESPIRATORY: normal effort and moving air bilaterally  ABDOMEN: soft, suprapubic tenderness, bowel sounds positive  MUSCULOSKELETAL: no deformity  NEURO: alert, moves all extremities, follows commands  PSYCH:  calm, cooperative  SKIN: warm, dry     LAB RESULTS  Lab Results (last 24 hours)       Procedure Component Value Units Date/Time    POC Glucose Once [[id number]]  (Normal) Collected: 05/11/25 1138    Specimen: Blood Updated: 05/11/25 1140     Glucose 123 mg/dL     Body Fluid Culture - Aspirate, Synovium [[id number]] Collected: 05/07/25 1135    Specimen: Aspirate from Synovium Updated: 05/11/25 0914     Body Fluid Culture No growth at 4 days     Gram Stain Moderate (3+) WBCs seen      No organisms seen    Basic Metabolic Panel [[id number]]  (Abnormal) Collected: 05/11/25 0534    Specimen: Blood Updated: 05/11/25 " 0630     Glucose 107 mg/dL      BUN 20 mg/dL      Creatinine 1.01 mg/dL      Sodium 138 mmol/L      Potassium 4.4 mmol/L      Comment: Slight hemolysis detected by analyzer. Result may be falsely elevated.        Chloride 107 mmol/L      CO2 22.3 mmol/L      Calcium 8.5 mg/dL      BUN/Creatinine Ratio 19.8     Anion Gap 8.7 mmol/L      eGFR 72.9 mL/min/1.73     Narrative:      GFR Categories in Chronic Kidney Disease (CKD)              GFR Category          GFR (mL/min/1.73)    Interpretation  G1                    90 or greater        Normal or high (1)  G2                    60-89                Mild decrease (1)  G3a                   45-59                Mild to moderate decrease  G3b                   30-44                Moderate to severe decrease  G4                    15-29                Severe decrease  G5                    14 or less           Kidney failure    (1)In the absence of evidence of kidney disease, neither GFR category G1 or G2 fulfill the criteria for CKD.    eGFR calculation 2021 CKD-EPI creatinine equation, which does not include race as a factor    CBC & Differential [[id number]]  (Abnormal) Collected: 05/11/25 0534    Specimen: Blood Updated: 05/11/25 0607    Narrative:      The following orders were created for panel order CBC & Differential.  Procedure                               Abnormality         Status                     ---------                               -----------         ------                     CBC Auto Differential[[id number]]        Abnormal            Final result                 Please view results for these tests on the individual orders.    CBC Auto Differential [[id number]]  (Abnormal) Collected: 05/11/25 0534    Specimen: Blood Updated: 05/11/25 0607     WBC 6.67 10*3/mm3      RBC 3.45 10*6/mm3      Hemoglobin 10.8 g/dL      Hematocrit 34.4 %      MCV 99.7 fL      MCH 31.3 pg      MCHC 31.4 g/dL      RDW 13.2 %      RDW-SD 48.0 fl      MPV 9.0 fL      Platelets  196 10*3/mm3      Neutrophil % 61.6 %      Lymphocyte % 23.1 %      Monocyte % 7.6 %      Eosinophil % 3.4 %      Basophil % 0.6 %      Immature Grans % 3.7 %      Neutrophils, Absolute 4.10 10*3/mm3      Lymphocytes, Absolute 1.54 10*3/mm3      Monocytes, Absolute 0.51 10*3/mm3      Eosinophils, Absolute 0.23 10*3/mm3      Basophils, Absolute 0.04 10*3/mm3      Immature Grans, Absolute 0.25 10*3/mm3      nRBC 0.0 /100 WBC     POC Glucose Once [[id number]]  (Normal) Collected: 05/11/25 0537    Specimen: Blood Updated: 05/11/25 0539     Glucose 102 mg/dL     POC Glucose Once [[id number]]  (Abnormal) Collected: 05/10/25 2120    Specimen: Blood Updated: 05/10/25 2121     Glucose 135 mg/dL     POC Glucose Once [[id number]]  (Normal) Collected: 05/10/25 1626    Specimen: Blood Updated: 05/10/25 1629     Glucose 94 mg/dL           Imaging Results (Last 24 Hours)       ** No results found for the last 24 hours. **            Current Facility-Administered Medications:     allopurinol (ZYLOPRIM) tablet 100 mg, 100 mg, Oral, Daily, Ochoa Flores MD, 100 mg at 05/11/25 0923    ascorbic acid (VITAMIN C) tablet 500 mg, 500 mg, Oral, Daily, Ochoa Flores MD, 500 mg at 05/11/25 0923    aspirin chewable tablet 81 mg, 81 mg, Oral, Daily, Ochoa Flores MD, 81 mg at 05/11/25 0924    atorvastatin (LIPITOR) tablet 10 mg, 10 mg, Oral, Nightly, Ochoa Flores MD, 10 mg at 05/10/25 2104    bisacodyl (DULCOLAX) suppository 10 mg, 10 mg, Rectal, Daily PRN, Ochoa Flores MD    carvedilol (COREG) tablet 6.25 mg, 6.25 mg, Oral, BID With Meals, Ochoa Flores MD, 6.25 mg at 05/11/25 0924    cholecalciferol (VITAMIN D3) tablet, , Oral, Daily, Ochoa Flores MD, 1,000 Units at 05/11/25 0923    cloNIDine (CATAPRES) tablet 0.1 mg, 0.1 mg, Oral, Q8H, Ochoa Flores MD, 0.1 mg at 05/11/25 0554    dextrose (D50W) (25 g/50 mL) IV injection 25 g, 25 g, Intravenous, Q15 Min PRN, Ochoa Flores MD    dextrose (GLUTOSE) oral gel 15 g, 15 g, Oral, Q15 Min PRN,  Ochoa Flores MD    Diclofenac Sodium (VOLTAREN) 1 % gel 2 g, 2 g, Topical, TID, Bassem Paul MD, 2 g at 05/11/25 0924    docusate sodium (COLACE) capsule 100 mg, 100 mg, Oral, BID, Ochoa Flores MD, 100 mg at 05/10/25 2104    famotidine (PEPCID) tablet 20 mg, 20 mg, Oral, BID, Ochoa Flores MD, 20 mg at 05/11/25 0924    fluticasone (FLONASE) 50 MCG/ACT nasal spray 2 spray, 2 spray, Nasal, Daily, Ochoa Flores MD, 2 spray at 05/10/25 0806    [START ON 5/12/2025] furosemide (LASIX) tablet 20 mg, 20 mg, Oral, Once per day on Monday Wednesday Friday, Froilan Marin MD    gabapentin (NEURONTIN) capsule 400 mg, 400 mg, Oral, TID, Ochoa Flores MD, 400 mg at 05/11/25 0924    glucagon (GLUCAGEN) injection 1 mg, 1 mg, Intramuscular, Q15 Min PRN, Ochoa Flores MD    HYDROcodone-acetaminophen (NORCO) 5-325 MG per tablet 1 tablet, 1 tablet, Oral, Q4H PRN, Ochoa Flores MD, 1 tablet at 05/07/25 1244    insulin glargine (LANTUS, SEMGLEE) injection 10 Units, 10 Units, Subcutaneous, Nightly, Ohcoa Flores MD, 10 Units at 05/10/25 2115    insulin lispro (HUMALOG/ADMELOG) injection 2-7 Units, 2-7 Units, Subcutaneous, 4x Daily AC & at Bedtime, Ochoa Flores MD, 2 Units at 05/08/25 1147    polyethylene glycol (MIRALAX) packet 17 g, 17 g, Oral, Daily, Ochoa Flores MD, 17 g at 05/08/25 0845    Insert Peripheral IV, , , Once **AND** sodium chloride 0.9 % flush 10 mL, 10 mL, Intravenous, PRN, Ochoa Flores MD    tamsulosin (FLOMAX) 24 hr capsule 0.8 mg, 0.8 mg, Oral, Daily, Ochoa Flores MD, 0.8 mg at 05/11/25 0924     ASSESSMENT  Acute E. coli emphysematous cystitis resolved  Urinary retention with Morrell catheter  Bilateral hydronephrosis  Acute kidney injury resolved  Left knee pseudogout with effusion status post aspiration  Paroxysmal atrial fibrillation  Chronic diastolic congestive heart failure  Aortic stenosis status post aortic valve replacement  Diabetes  mellitus  Hypertension  Hyperlipidemia  BPH  Neuropathy  Osteoarthritis with left knee effusion  Chronic kidney disease stage III    PLAN  Discharge to acute rehab  Discharge summary dictated    OLGA LIDIA LOPEZ MD    Copied text in this note has been reviewed and is accurate as of 05/11/25

## 2025-05-11 NOTE — DISCHARGE SUMMARY
Discharge summary    Date of admission 5/3/2025  Date of discharge 5/11/2025    Final diagnosis  Acute E. coli emphysematous cystitis resolved  Urinary retention with Morrell catheter  Bilateral hydronephrosis  Acute kidney injury resolved  Left knee pseudogout with effusion status post aspiration  Paroxysmal atrial fibrillation  Chronic diastolic congestive heart failure  Aortic stenosis status post aortic valve replacement  Diabetes mellitus  Hypertension  Hyperlipidemia  BPH  Neuropathy  Osteoarthritis   Chronic kidney disease stage III    Discharge medications    Current Facility-Administered Medications:     allopurinol (ZYLOPRIM) tablet 100 mg, 100 mg, Oral, Daily, Ochoa Flores MD, 100 mg at 05/11/25 0923    ascorbic acid (VITAMIN C) tablet 500 mg, 500 mg, Oral, Daily, Ochoa Flores MD, 500 mg at 05/11/25 0923    aspirin chewable tablet 81 mg, 81 mg, Oral, Daily, Ochoa Flores MD, 81 mg at 05/11/25 0924    atorvastatin (LIPITOR) tablet 10 mg, 10 mg, Oral, Nightly, Ochoa Flores MD, 10 mg at 05/10/25 2104    carvedilol (COREG) tablet 6.25 mg, 6.25 mg, Oral, BID With Meals, Ochoa Flores MD, 6.25 mg at 05/11/25 0924    cholecalciferol (VITAMIN D3) tablet, , Oral, Daily, Ochoa Flores MD, 1,000 Units at 05/11/25 0923    cloNIDine (CATAPRES) tablet 0.1 mg, 0.1 mg, Oral, Q8H, Ochoa Flores MD, 0.1 mg at 05/11/25 0554    Diclofenac Sodium (VOLTAREN) 1 % gel 2 g, 2 g, Topical, TID, Bassem Paul MD, 2 g at 05/11/25 0924    docusate sodium (COLACE) capsule 100 mg, 100 mg, Oral, BID, Ochoa Flores MD, 100 mg at 05/10/25 2104    famotidine (PEPCID) tablet 20 mg, 20 mg, Oral, BID, Ochoa Flores MD, 20 mg at 05/11/25 0924    fluticasone (FLONASE) 50 MCG/ACT nasal spray 2 spray, 2 spray, Nasal, Daily, Ochoa Flores MD, 2 spray at 05/10/25 0806    [START ON 5/12/2025] furosemide (LASIX) tablet 20 mg, 20 mg, Oral, Once per day on Monday Wednesday Friday, Froilan Marin MD    gabapentin (NEURONTIN) capsule 400  mg, 400 mg, Oral, TID, Ochoa Flores MD, 400 mg at 05/11/25 0924    insulin glargine (LANTUS, SEMGLEE) injection 10 Units, 10 Units, Subcutaneous, Nightly, Ochoa Flores MD, 10 Units at 05/10/25 2115    insulin lispro (HUMALOG/ADMELOG) injection 2-7 Units, 2-7 Units, Subcutaneous, 4x Daily AC & at Bedtime, Ochoa Flores MD, 2 Units at 05/08/25 1147    polyethylene glycol (MIRALAX) packet 17 g, 17 g, Oral, Daily, Ochoa Flores MD, 17 g at 05/08/25 0845    Insert Peripheral IV, , , Once **AND** sodium chloride 0.9 % flush 10 mL, 10 mL, Intravenous, PRN, Ochoa Flores MD    tamsulosin (FLOMAX) 24 hr capsule 0.8 mg, 0.8 mg, Oral, Daily, Ochoa Flores MD, 0.8 mg at 05/11/25 0924     Consults obtained  Nephrology  Neurology  Orthopedic surgery    Procedures  Left knee aspiration    Hospital course  84-year-old -American male with history of coronary artery disease congestive heart failure aortic stenosis diabetes hypertension hyperlipidemia and chronic kidney disease admitted to emergency room with dysuria for 1 week.  Patient workup in ER revealed acute emphysematous cystitis with acute kidney injury and also found to have acute urinary retention.  Patient admitted treated with Morrell catheter placement empiric antibiotics IV fluid and his diuretics Cozaar and Amaryl was held and further evaluated by nephrology and urology.  During hospitalization patient developed severe left knee pain and found to have left knee effusion which was drained by orthopedic surgery and found to have acute pseudogout which resolved with appropriate treatment.  Neurology recommends that he can be discharged with Morrell catheter but is still weak and agreeable to go to rehab.  Patient completed antibiotics and his kidney function improved and he was restarted on half dose of diuretics but he needs to stay off on Cozaar Amaryl and Farxiga.  Patient cleared for discharge from all consultant.      Discharge diet regular    Activity per PT  OT    Medication as above    Further care per accepting physician at rehab and follow-up with urology nephrology and orthopedic surgery per the instructions and take medication as directed.    OLGA LIDIA LOPEZ MD

## 2025-05-11 NOTE — NURSING NOTE
Order to discharge patient to [facility]. Report called to Leonor TUCKER. IV removed. All belongings sent with patient and [facility]. Patient discharged.

## 2025-05-11 NOTE — PLAN OF CARE
Goal Outcome Evaluation:  VSS. No c/o pain. Monitor labs. Plan to dc to rehab am. Plan of care ongoing.

## 2025-05-11 NOTE — SIGNIFICANT NOTE
05/11/25 1410   OTHER   Discipline physical therapist   Rehab Time/Intention   Session Not Performed other (see comments)  (Pt DCing from facility at 3 PM today per RN)

## 2025-05-11 NOTE — CASE MANAGEMENT/SOCIAL WORK
Continued Stay Note  [facility]     Patient Name: Vj Bright  MRN: [id number]  Today's Date: 5/11/2025    Admit Date: 5/3/2025    Plan: [facility]   Discharge Plan       Row Name 05/11/25 1317       Plan    Plan Comments [facility] notified that pt has a bed at [facility] information relayed care team. [facility] [place] [facility] for 3[facility] today. RN updated                   Discharge Codes    No documentation.                 Expected Discharge Date and Time       Expected Discharge Date Expected Discharge Time    May 11, 2025               Bhavani Coy RN

## 2025-05-11 NOTE — PROGRESS NOTES
[facility] Progress Note      Patient Name: Vj Bright  : 1940  MRN: [id number]  Primary Care Physician:  Nany Real MD  Date of admission: 5/3/2025    Subjective     Interval History:   F/U JUDY    Review of Systems:   Denies dyspnea or swelling     Objective     Vitals:   Temp:  [97.6 °F (36.4 °C)-98.3 °F (36.8 °C)] 98.3 °F (36.8 °C)  Heart Rate:  [59-64] 60  Resp:  [16] 16  BP: (139-169)/(66-75) 140/74    Intake/Output Summary (Last 24 hours) at 2025 0854  Last data filed at 2025 0540  Gross per 24 hour   Intake 1320 ml   Output 2050 ml   Net -730 ml       Physical Exam:    General Appearance: alert, comfortable no distress  Neck: supple, no JVD  Lungs: CTA bilat no rales  Heart: RRR, normal S1 and S2  Abdomen: soft, nontender, nondistended  Extremities: trace BLE edema    Scheduled Meds:     allopurinol, 100 mg, Oral, Daily  ascorbic acid, 500 mg, Oral, Daily  aspirin, 81 mg, Oral, Daily  atorvastatin, 10 mg, Oral, Nightly  carvedilol, 6.25 mg, Oral, BID With Meals  cholecalciferol, , Oral, Daily  cloNIDine, 0.1 mg, Oral, Q8H  Diclofenac Sodium, 2 g, Topical, TID  docusate sodium, 100 mg, Oral, BID  famotidine, 20 mg, Oral, BID  fluticasone, 2 spray, Nasal, Daily  [START ON 2025] furosemide, 20 mg, Oral, Once per day on   gabapentin, 400 mg, Oral, TID  insulin glargine, 10 Units, Subcutaneous, Nightly  insulin lispro, 2-7 Units, Subcutaneous, 4x Daily AC & at Bedtime  polyethylene glycol, 17 g, Oral, Daily  tamsulosin, 0.8 mg, Oral, Daily      IV Meds:        Results Reviewed:   I have personally reviewed the results from the time of this admission to 2025 08:54 EDT     Results from last 7 days   Lab Units 25  0534 05/10/25  0520 25  1614 25  0523 25  0554   SODIUM mmol/L 138 138 136   < > 138   POTASSIUM mmol/L 4.4 4.3 4.4   < > 4.4   CHLORIDE mmol/L 107 108* 104   < > 107   CO2 mmol/L 22.3  22.0 22.6   < > 23.3   BUN mg/dL 20 24* 30*   < > 32*   CREATININE mg/dL 1.01 1.09 1.44*   < > 1.42*   CALCIUM mg/dL 8.5* 8.1* 8.7   < > 8.0*   BILIRUBIN mg/dL  --   --   --   --  0.3   ALK PHOS U/L  --   --   --   --  63   ALT (SGPT) U/L  --   --   --   --  18   AST (SGOT) U/L  --   --   --   --  13   GLUCOSE mg/dL 107* 129* 175*   < > 80    < > = values in this interval not displayed.     Estimated Creatinine Clearance: 61.2 mL/min (by C-G formula based on SCr of 1.01 mg/dL).  Results from last 7 days   Lab Units 05/10/25  0520 05/09/25  1614 05/08/25  0457 05/07/25  0548 05/05/25  0554   MAGNESIUM mg/dL  --   --   --   --  2.4   PHOSPHORUS mg/dL 3.7 3.7 3.6   < > 3.0    < > = values in this interval not displayed.     Results from last 7 days   Lab Units 05/06/25  0523 05/05/25  0554   URIC ACID mg/dL 1.4* 1.5*     Results from last 7 days   Lab Units 05/11/25  0534 05/10/25  0520 05/09/25  0625 05/08/25  0457 05/07/25  0548   WBC 10*3/mm3 6.67 6.65 7.86 12.86* 8.88   HEMOGLOBIN g/dL 10.8* 10.2* 11.0* 10.5* 11.9*   PLATELETS 10*3/mm3 196 184 190 188 167           Assessment / Plan     ASSESSMENT:  Acute kidney injury - Secondary to acute urinary obstruction. CT abd/pelvis without revealed emphysematous cystitis and mild bilateral hydro. Renal function improved initially w placement of lopez catheter but Cr climbed to 1.4 couple days ago prompting IVF - better now 1.0, lower than BL.  Mild periph edema with low alb (2.4) and diuretic on hold  Chronic kidney disease stage IIIb - Etiology secondary to hypertensive nephrosclerosis, diabetic nephropathy and chronic bladder outlet obstruction from BPH. Followed by Dr. SHUN Garvey with out group as an outpatient. Baseline creatinine fluctuates between 1.4-1.8 mg/dl.   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  Antibiotic by primary and urology (off abx?) . Lopez to remain  BPH - on flomax  Hypertensive chronic kidney disease -  decent control with  to 150 range; losartan been on hold and is listed as allergy (angioedema) but patient been taking it lately.  He recalls this was remote episode (wonder if from ACEi?)  Type II diabetes mellitus- Per primary team. Patient is on Farxiga as an outpatient. Currently being held due to JUDY. Would recommend continuing to hold until infection is cleared given increased risk for  infections with SGLT-2i medications.   Anemia in chronic kidney disease - hgb stable  Pseudogout    PLAN:  Resume lasix at lower dose 20mg MWF   Hold losartan still given ill-defined nature of listed allergy, can resume in office as needed  Hold farxiga at discharge   Note plan for discharge to SNF , will arrange nephrology follow up 2 weeks (Dr Garvey)      Froilan Marin MD  05/11/25  08:54 EDT    [facility]  [phone]

## 2025-05-12 LAB
BACTERIA FLD CULT: NORMAL
BACTERIA SPEC ANAEROBE CULT: NORMAL
GRAM STN SPEC: NORMAL
GRAM STN SPEC: NORMAL

## 2025-05-13 NOTE — CASE MANAGEMENT/SOCIAL WORK
Case Management Discharge Note      Final Note: [facility] via [facility] stretcher van         Selected Continued Care - Discharged on 5/11/2025 Admission date: 5/3/2025 - Discharge disposition: Rehab Facility or Unit (DC - External)      Destination Coordination complete.      Service Provider Services Address Phone Fax Patient Preferred    [facility] [address] [place] [id number] [phone] --              Durable Medical Equipment    No services have been selected for the patient.                Dialysis/Infusion    No services have been selected for the patient.                Home Medical Care    No services have been selected for the patient.                Therapy    No services have been selected for the patient.                Community Resources    No services have been selected for the patient.                Community & DME    No services have been selected for the patient.                    Transportation Services  W/C Van: [facility]    Final Discharge Disposition Code: 62 - inpatient rehab facility

## 2025-06-04 RX ORDER — LOSARTAN POTASSIUM 50 MG/1
50 TABLET ORAL DAILY
Qty: 90 TABLET | Refills: 4 | OUTPATIENT
Start: 2025-06-04

## 2025-06-04 RX ORDER — CARVEDILOL 6.25 MG/1
6.25 TABLET ORAL 2 TIMES DAILY WITH MEALS
Qty: 180 TABLET | Refills: 2 | Status: SHIPPED | OUTPATIENT
Start: 2025-06-04